# Patient Record
Sex: FEMALE | Race: WHITE | NOT HISPANIC OR LATINO | Employment: OTHER | URBAN - METROPOLITAN AREA
[De-identification: names, ages, dates, MRNs, and addresses within clinical notes are randomized per-mention and may not be internally consistent; named-entity substitution may affect disease eponyms.]

---

## 2017-01-04 RX ORDER — BUPROPION HYDROCHLORIDE 300 MG/1
TABLET ORAL
Qty: 30 TABLET | Refills: 5 | Status: SHIPPED | OUTPATIENT
Start: 2017-01-04 | End: 2017-07-04 | Stop reason: SDUPTHER

## 2017-01-05 ENCOUNTER — TELEPHONE (OUTPATIENT)
Dept: FAMILY MEDICINE CLINIC | Facility: CLINIC | Age: 72
End: 2017-01-05

## 2017-01-05 ENCOUNTER — OFFICE VISIT (OUTPATIENT)
Dept: FAMILY MEDICINE CLINIC | Facility: CLINIC | Age: 72
End: 2017-01-05

## 2017-01-05 VITALS
RESPIRATION RATE: 20 BRPM | HEART RATE: 105 BPM | HEIGHT: 61 IN | BODY MASS INDEX: 40.4 KG/M2 | OXYGEN SATURATION: 95 % | WEIGHT: 214 LBS | SYSTOLIC BLOOD PRESSURE: 126 MMHG | DIASTOLIC BLOOD PRESSURE: 84 MMHG | TEMPERATURE: 98 F

## 2017-01-05 DIAGNOSIS — J01.00 ACUTE NON-RECURRENT MAXILLARY SINUSITIS: ICD-10-CM

## 2017-01-05 DIAGNOSIS — J45.20 MILD INTERMITTENT ASTHMA WITHOUT COMPLICATION: Primary | ICD-10-CM

## 2017-01-05 DIAGNOSIS — K21.9 GASTROESOPHAGEAL REFLUX DISEASE WITHOUT ESOPHAGITIS: ICD-10-CM

## 2017-01-05 DIAGNOSIS — E78.2 MIXED HYPERLIPIDEMIA: ICD-10-CM

## 2017-01-05 PROCEDURE — 99213 OFFICE O/P EST LOW 20 MIN: CPT | Performed by: FAMILY MEDICINE

## 2017-01-05 RX ORDER — AMOXICILLIN AND CLAVULANATE POTASSIUM 875; 125 MG/1; MG/1
1 TABLET, FILM COATED ORAL 2 TIMES DAILY
Qty: 14 TABLET | Refills: 0 | Status: SHIPPED | OUTPATIENT
Start: 2017-01-05 | End: 2017-10-16

## 2017-01-05 RX ORDER — AMITRIPTYLINE HYDROCHLORIDE 10 MG/1
10 TABLET, FILM COATED ORAL NIGHTLY
Qty: 30 TABLET | Refills: 5 | Status: SHIPPED | OUTPATIENT
Start: 2017-01-05 | End: 2017-10-16 | Stop reason: SDUPTHER

## 2017-01-05 RX ORDER — PROMETHAZINE HYDROCHLORIDE AND CODEINE PHOSPHATE 6.25; 1 MG/5ML; MG/5ML
5 SYRUP ORAL EVERY 4 HOURS PRN
Qty: 118 ML | Refills: 0 | Status: SHIPPED | OUTPATIENT
Start: 2017-01-05 | End: 2017-10-16

## 2017-01-05 NOTE — PATIENT INSTRUCTIONS
This is a very nice 71-year-old with acute sinusitis.  I'll will prescribe Augmentin but would like her to use her Flonase and her antihistamine and take a probiotic and call me if she does not get better

## 2017-01-05 NOTE — TELEPHONE ENCOUNTER
Pt called and said she didn't fill cough syrup it was to expensive. Said this has happened before she said you gave to phenergan cough syrup and something else before. Can you send that over for her please?

## 2017-01-05 NOTE — MR AVS SNAPSHOT
Bella MOORE Roseline   1/5/2017 10:30 AM   Office Visit    Dept Phone:  [phone]   Encounter #:  [id number]    Provider:  Ambrose Wilson MD   Department:  [facility]IMARY [facility]                Your Full Care Plan              Today's Medication Changes          These changes are accurate as of: 1/5/17 10:49 AM.  If you have any questions, ask your nurse or doctor.               New Medication(s)Ordered:     amoxicillin-clavulanate 875-125 MG per tablet   Commonly known as:  AUGMENTIN   Take 1 tablet by mouth 2 (Two) Times a Day.            Where to Get Your Medications      These medications were sent to [facility]/pharmacy #[id number] [facility] [id number] [facility]. AT CORNER OF [facility] - [phone] [facility] [phone]   [id number] [place] [address], [place] [address]     Phone:  [phone]     amoxicillin-clavulanate 875-125 MG per tablet         You can get these medications from any pharmacy     Bring a paper prescription for each of these medications     HYDROcod Polst-CPM Polst ER 10-8 MG/5ML ER suspension                  Your Updated Medication List          This list is accurate as of: 1/5/17 10:49 AM.  Always use your most recent med list.                albuterol 108 (90 BASE) MCG/ACT inhaler   Commonly known as:  PROAIR RESPICLICK   Inhale 2 puffs Every 4 (Four) Hours As Needed for wheezing.       * amitriptyline 10 MG tablet   Commonly known as:  ELAVIL   TAKE 1 TABLET AT BEDTIME.       * amitriptyline 10 MG tablet   Commonly known as:  ELAVIL   TAKE 1 TABLET AT BEDTIME.       amLODIPine 5 MG tablet   Commonly known as:  NORVASC       amoxicillin-clavulanate 875-125 MG per tablet   Commonly known as:  AUGMENTIN   Take 1 tablet by mouth 2 (Two) Times a Day.       aspirin 81 MG tablet       atorvastatin 40 MG tablet   Commonly known as:  LIPITOR   TAKE 1 TABLET BY MOUTH EVERY DAY       baclofen 10 MG tablet   Commonly known as:  LIORESAL   Take 1 tablet by mouth 3  (three) times a day.       buPROPion  MG 24 hr tablet   Commonly known as:  WELLBUTRIN XL   TAKE 1 TABLET BY MOUTH DAILY.       cetirizine 10 MG tablet   Commonly known as:  zyrTEC       citalopram 10 MG tablet   Commonly known as:  CeleXA   TAKE 1 TABLET TWICE DAILY       doxycycline 50 MG capsule   Commonly known as:  MONODOX   Take 1 capsule by mouth 2 (Two) Times a Day.       ESTROGENS CONJ SYNTHETIC A PO       fluticasone 50 MCG/ACT nasal spray   Commonly known as:  FLONASE       guaifenesin-codeine 100-10 MG/5ML liquid   Commonly known as:  GUAIFENESIN AC   Take 5 mL by mouth 3 (Three) Times a Day As Needed for cough.       HYDROcod Polst-CPM Polst ER 10-8 MG/5ML ER suspension   Commonly known as:  TUSSIONEX PENNKINETIC ER   Take 5 mL by mouth Every 12 (Twelve) Hours As Needed for cough.       losartan 100 MG tablet   Commonly known as:  COZAAR   TAKE ONE TABLET BY MOUTH EVERY DAY       meloxicam 15 MG tablet   Commonly known as:  MOBIC   TAKE ONE TABLET BY MOUTH EVERY DAY       MethylPREDNISolone 4 MG tablet   Commonly known as:  MEDROL (LANDON)   Take as directed on package instructions.       montelukast 10 MG tablet   Commonly known as:  SINGULAIR   TAKE 1 TABLET BY MOUTH DAILY       multivitamin tablet tablet       PROTONIX 40 MG EC tablet   Generic drug:  pantoprazole       pseudoephedrine-guaifenesin  MG per 12 hr tablet   Commonly known as:  MUCINEX D   Take 1 tablet by mouth every 12 (twelve) hours.       STOOL SOFTENER 100 MG capsule   Generic drug:  docusate sodium       VITAMIN B-6 PO       VITAMIN B12 PO       VITAMIN D-3 PO       * Notice:  This list has 2 medication(s) that are the same as other medications prescribed for you. Read the directions carefully, and ask your doctor or other care provider to review them with you.            You Were Diagnosed With        Codes Comments    Mild intermittent asthma without complication    -  Primary ICD-10-CM: J45.20  ICD-9-CM: 493.90      "Gastroesophageal reflux disease without esophagitis     ICD-10-CM: K21.9  ICD-9-CM: 530.81     Mixed hyperlipidemia     ICD-10-CM: E78.2  ICD-9-CM: 272.2     Acute non-recurrent maxillary sinusitis     ICD-10-CM: J01.00  ICD-9-CM: 461.0       Instructions     None    Patient Instructions History      Upcoming Appointments     Visit Type Date Time Department    OFFICE VISIT 2017 10:30 AM [employer] Signup     [facility] allows you to send messages to your doctor, view your test results, renew your prescriptions, schedule appointments, and more. To sign up, go to [email or web address] and click on the Sign Up Now link in the New User? box. Enter your [employer] Activation Code exactly as it appears below along with the last four digits of your Social Security Number and your Date of Birth () to complete the sign-up process. If you do not sign up before the expiration date, you must request a new code.    Perfect [employer] Activation Code: [email or web address]  Expires: 2017  2:47 PM    If you have questions, you can email [email or web address] or call [phone] to talk to our Perfect [employer] staff. Remember, Perfect Memory is NOT to be used for urgent needs. For medical emergencies, dial 911.               Other Info from Your Visit           Allergies     Epinephrine        Reason for Visit     Sinusitis     URI had bronchitis in november       Vital Signs     Blood Pressure Pulse Temperature Respirations Height Weight    126/84 (BP Location: Left arm) 105 98 °F (36.7 °C) (Oral) 20 61\" (154.9 cm) 214 lb (97.1 kg)    Oxygen Saturation Body Mass Index Smoking Status             95% 40.43 kg/m2 Former Smoker         Problems and Diagnoses Noted     Acute non-recurrent maxillary sinusitis    High blood pressure    Acid reflux disease    Mild intermittent asthma    Mixed hyperlipidemia        "

## 2017-01-05 NOTE — PROGRESS NOTES
Subjective   Bella Dukes is a 71 y.o. female presenting with   Chief Complaint   Patient presents with   • Sinusitis   • URI     had bronchitis in november HPI Comments: This is a 71-year-old  nonsmoker whose  has ulcerative colitis so she has a lot of probiotic around the house, who tells me that for the last 5 days she has had marked sinus pressure and drainage.  She also has yellow nasal discharge and feels feverish.  I rechecked her temperature and got 99.5 which she tells me he is high for her.  I also rechecked her blood pressure and got 142/86.    Fortunately she does not have a high fever or chills or severe headache or confusion or stiff neck or double vision or chest pain or shortness of breath.  She does have a cough that is bothering her at night and would like to have a refill on the cough medication.    Sinusitis   Associated symptoms include sinus pressure.   URI    Associated symptoms include rhinorrhea.        The following portions of the patient's history were reviewed and updated as appropriate: current medications, past family history, past medical history, past social history, past surgical history and problem list.    Review of Systems   HENT: Positive for postnasal drip, rhinorrhea and sinus pressure.    All other systems reviewed and are negative.      Objective   Physical Exam   Constitutional: She is oriented to person, place, and time. She appears well-developed and well-nourished. No distress.   HENT:   Head: Normocephalic and atraumatic.   Right Ear: Tympanic membrane is erythematous. A middle ear effusion is present.   Left Ear: A middle ear effusion is present.   Nose: Mucosal edema and rhinorrhea present. No epistaxis.   Mouth/Throat: No oropharyngeal exudate.   Eyes: EOM are normal. Pupils are equal, round, and reactive to light. No scleral icterus.   Neck: Normal range of motion. Neck supple. No thyromegaly present.   Cardiovascular: Normal rate and regular  rhythm.    Pulmonary/Chest: Effort normal and breath sounds normal. No stridor.   Musculoskeletal: Normal range of motion. She exhibits no edema.   Neurological: She is alert and oriented to person, place, and time.   Skin: Skin is warm and dry. No rash noted.   Psychiatric: She has a normal mood and affect. Her behavior is normal.   Nursing note and vitals reviewed.      Assessment/Plan   Bella was seen today for sinusitis and uri.    Diagnoses and all orders for this visit:    Mild intermittent asthma without complication    Gastroesophageal reflux disease without esophagitis    Mixed hyperlipidemia    Acute non-recurrent maxillary sinusitis    Other orders  -     HYDROcod Polst-CPM Polst ER (TUSSIONEX PENNKINETIC ER) 10-8 MG/5ML ER suspension; Take 5 mL by mouth Every 12 (Twelve) Hours As Needed for cough.  -     amoxicillin-clavulanate (AUGMENTIN) 875-125 MG per tablet; Take 1 tablet by mouth 2 (Two) Times a Day.  -     amitriptyline (ELAVIL) 10 MG tablet; Take 1 tablet by mouth Every Night.                   I would like him to return for another visit in 6 month(s)

## 2017-01-05 NOTE — TELEPHONE ENCOUNTER
Pt said she doesn't want to come back because she is not sure how much the new script will cost.    She said cheratussin can be called in per phm and ins should cover it???

## 2017-01-09 RX ORDER — LOSARTAN POTASSIUM 100 MG/1
TABLET ORAL
Qty: 30 TABLET | Refills: 5 | Status: SHIPPED | OUTPATIENT
Start: 2017-01-09 | End: 2017-07-04 | Stop reason: SDUPTHER

## 2017-02-27 RX ORDER — MONTELUKAST SODIUM 10 MG/1
TABLET ORAL
Qty: 30 TABLET | Refills: 1 | Status: SHIPPED | OUTPATIENT
Start: 2017-02-27 | End: 2017-04-29 | Stop reason: SDUPTHER

## 2017-03-31 RX ORDER — CITALOPRAM 10 MG/1
TABLET ORAL
Qty: 60 TABLET | Refills: 2 | Status: SHIPPED | OUTPATIENT
Start: 2017-03-31 | End: 2017-06-26 | Stop reason: SDUPTHER

## 2017-05-01 RX ORDER — MONTELUKAST SODIUM 10 MG/1
TABLET ORAL
Qty: 30 TABLET | Refills: 5 | Status: SHIPPED | OUTPATIENT
Start: 2017-05-01 | End: 2017-11-06 | Stop reason: SDUPTHER

## 2017-06-26 RX ORDER — CITALOPRAM 10 MG/1
TABLET ORAL
Qty: 60 TABLET | Refills: 5 | Status: SHIPPED | OUTPATIENT
Start: 2017-06-26 | End: 2017-12-18 | Stop reason: SDUPTHER

## 2017-07-05 RX ORDER — LOSARTAN POTASSIUM 100 MG/1
TABLET ORAL
Qty: 30 TABLET | Refills: 5 | Status: SHIPPED | OUTPATIENT
Start: 2017-07-05 | End: 2018-07-20 | Stop reason: SDUPTHER

## 2017-07-05 RX ORDER — BUPROPION HYDROCHLORIDE 300 MG/1
TABLET ORAL
Qty: 30 TABLET | Refills: 5 | Status: SHIPPED | OUTPATIENT
Start: 2017-07-05 | End: 2017-10-16

## 2017-07-07 RX ORDER — BUPROPION HYDROCHLORIDE 300 MG/1
TABLET ORAL
Qty: 30 TABLET | Refills: 5 | Status: SHIPPED | OUTPATIENT
Start: 2017-07-07 | End: 2017-12-29 | Stop reason: SDUPTHER

## 2017-07-07 RX ORDER — LOSARTAN POTASSIUM 100 MG/1
TABLET ORAL
Qty: 30 TABLET | Refills: 5 | Status: SHIPPED | OUTPATIENT
Start: 2017-07-07 | End: 2017-10-16 | Stop reason: SDUPTHER

## 2017-09-01 RX ORDER — AMITRIPTYLINE HYDROCHLORIDE 10 MG/1
TABLET, FILM COATED ORAL
Qty: 30 TABLET | Refills: 5 | Status: SHIPPED | OUTPATIENT
Start: 2017-09-01 | End: 2017-12-29 | Stop reason: SDUPTHER

## 2017-09-20 RX ORDER — ATORVASTATIN CALCIUM 40 MG/1
TABLET, FILM COATED ORAL
Qty: 90 TABLET | Refills: 2 | OUTPATIENT
Start: 2017-09-20

## 2017-09-22 RX ORDER — ATORVASTATIN CALCIUM 40 MG/1
TABLET, FILM COATED ORAL
Qty: 90 TABLET | Refills: 1 | Status: SHIPPED | OUTPATIENT
Start: 2017-09-22 | End: 2018-04-04 | Stop reason: SDUPTHER

## 2017-10-16 ENCOUNTER — OFFICE VISIT (OUTPATIENT)
Dept: FAMILY MEDICINE CLINIC | Facility: CLINIC | Age: 72
End: 2017-10-16

## 2017-10-16 VITALS
DIASTOLIC BLOOD PRESSURE: 78 MMHG | BODY MASS INDEX: 40.97 KG/M2 | HEIGHT: 61 IN | TEMPERATURE: 95.6 F | HEART RATE: 90 BPM | WEIGHT: 217 LBS | SYSTOLIC BLOOD PRESSURE: 100 MMHG | OXYGEN SATURATION: 96 %

## 2017-10-16 DIAGNOSIS — J45.20 MILD INTERMITTENT ASTHMA WITHOUT COMPLICATION: ICD-10-CM

## 2017-10-16 DIAGNOSIS — M25.532 WRIST PAIN, ACUTE, LEFT: ICD-10-CM

## 2017-10-16 DIAGNOSIS — K21.9 GASTROESOPHAGEAL REFLUX DISEASE WITHOUT ESOPHAGITIS: ICD-10-CM

## 2017-10-16 DIAGNOSIS — I10 ESSENTIAL HYPERTENSION: ICD-10-CM

## 2017-10-16 DIAGNOSIS — E78.2 MIXED HYPERLIPIDEMIA: Primary | ICD-10-CM

## 2017-10-16 PROCEDURE — 73110 X-RAY EXAM OF WRIST: CPT | Performed by: FAMILY MEDICINE

## 2017-10-16 PROCEDURE — G0008 ADMIN INFLUENZA VIRUS VAC: HCPCS | Performed by: FAMILY MEDICINE

## 2017-10-16 PROCEDURE — 99213 OFFICE O/P EST LOW 20 MIN: CPT | Performed by: FAMILY MEDICINE

## 2017-10-16 PROCEDURE — 90662 IIV NO PRSV INCREASED AG IM: CPT | Performed by: FAMILY MEDICINE

## 2017-10-16 NOTE — PROGRESS NOTES
Procedure   Procedures   X Ray report:    To further evaluate her complaint of wrist pain after a fall I have requested a left wrist x-ray.  There are no old ones to compare this to but this shows some arthritic deformity and possibly a very subtle nondisplaced fracture of the distal radius.  I will await radiology over read.  However I have recommended she see our hand specialist.

## 2017-10-16 NOTE — PROGRESS NOTES
Subjective   Bella Dukes is a 72 y.o. 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  Since then she has had increased pain in the left wrist.    She also has had a left knee replacement and is having a lot of trouble with her right hip.  She also apparently has a lot of trouble with her right knee.  She needs a referral to an orthopedic doctor.    Cough     Cyst   Associated symptoms include arthralgias, coughing and joint swelling.        The following portions of the patient's history were reviewed and updated as appropriate: current medications, past family history, past medical history, past social history, past surgical history and problem list.    Review of Systems   Respiratory: Positive for cough.    Musculoskeletal: Positive for arthralgias and joint swelling.   All other systems reviewed and are negative.      Objective   Physical Exam   Constitutional: She is oriented to person, place, and time. She appears well-developed and well-nourished. No distress.   HENT:   Head: Normocephalic and atraumatic.   Eyes: EOM are normal. Pupils are equal, round, and reactive to light.   Neck: Normal range of motion. Neck supple.   Musculoskeletal: She exhibits edema (modest swelling of left wrist) and tenderness ( discomfort and decreased range of motion of left wrist without gross deformity).   Neurological: She is alert and oriented to person, place, and time.   Skin: Skin is warm and dry. She is not diaphoretic.   Psychiatric: She has a normal mood and affect. Her behavior is normal.   Nursing note and vitals  reviewed.      Assessment/Plan   Bella was seen today for cough, cyst, sore, referral and injections.    Diagnoses and all orders for this visit:    Mixed hyperlipidemia    Essential hypertension    Wrist pain, acute, left  -     XR Wrist 3+ View Left (In Office)    Gastroesophageal reflux disease without esophagitis    Mild intermittent asthma without complication                   I would like him to return for another visit in 6 month(s)

## 2017-10-16 NOTE — PATIENT INSTRUCTIONS
This is a very nice 72-year-old who fell and now has discomfort in her left wrist.  I will request consultation with our hand specialist.  I would like her to call if there is a problem.

## 2017-11-07 ENCOUNTER — APPOINTMENT (OUTPATIENT)
Dept: WOMENS IMAGING | Facility: HOSPITAL | Age: 72
End: 2017-11-07

## 2017-11-07 PROCEDURE — G0202 SCR MAMMO BI INCL CAD: HCPCS | Performed by: RADIOLOGY

## 2017-11-07 RX ORDER — MONTELUKAST SODIUM 10 MG/1
TABLET ORAL
Qty: 30 TABLET | Refills: 5 | Status: SHIPPED | OUTPATIENT
Start: 2017-11-07 | End: 2018-04-03 | Stop reason: SDUPTHER

## 2017-12-19 RX ORDER — CITALOPRAM 10 MG/1
TABLET ORAL
Qty: 60 TABLET | Refills: 5 | Status: SHIPPED | OUTPATIENT
Start: 2017-12-19 | End: 2017-12-29 | Stop reason: SDUPTHER

## 2017-12-28 ENCOUNTER — TELEPHONE (OUTPATIENT)
Dept: FAMILY MEDICINE CLINIC | Facility: CLINIC | Age: 72
End: 2017-12-28

## 2017-12-28 NOTE — TELEPHONE ENCOUNTER
[facility] is requesting refill on Bella's    Citalopram 10 mg # 180    a 90 day supply                                                               Bupropion XL 300mg # 90                                                               Amitriptyline   10 mg        #90      LOV   10-16-17

## 2017-12-29 RX ORDER — CITALOPRAM 10 MG/1
10 TABLET ORAL 2 TIMES DAILY
Qty: 60 TABLET | Refills: 5 | Status: SHIPPED | OUTPATIENT
Start: 2017-12-29 | End: 2018-07-30 | Stop reason: SDUPTHER

## 2017-12-29 RX ORDER — BUPROPION HYDROCHLORIDE 300 MG/1
300 TABLET ORAL EVERY MORNING
Qty: 30 TABLET | Refills: 5 | Status: SHIPPED | OUTPATIENT
Start: 2017-12-29 | End: 2018-07-30 | Stop reason: SDUPTHER

## 2017-12-29 RX ORDER — AMITRIPTYLINE HYDROCHLORIDE 10 MG/1
10 TABLET, FILM COATED ORAL NIGHTLY
Qty: 30 TABLET | Refills: 5 | Status: SHIPPED | OUTPATIENT
Start: 2017-12-29 | End: 2018-07-09 | Stop reason: SDUPTHER

## 2018-04-03 RX ORDER — MONTELUKAST SODIUM 10 MG/1
TABLET ORAL
Qty: 30 TABLET | Refills: 2 | Status: SHIPPED | OUTPATIENT
Start: 2018-04-03 | End: 2020-04-13

## 2018-04-04 RX ORDER — ATORVASTATIN CALCIUM 40 MG/1
TABLET, FILM COATED ORAL
Qty: 90 TABLET | Refills: 1 | Status: SHIPPED | OUTPATIENT
Start: 2018-04-04 | End: 2020-03-24

## 2018-04-06 ENCOUNTER — OFFICE VISIT (OUTPATIENT)
Dept: FAMILY MEDICINE CLINIC | Facility: CLINIC | Age: 73
End: 2018-04-06

## 2018-04-06 VITALS
SYSTOLIC BLOOD PRESSURE: 102 MMHG | HEIGHT: 61 IN | HEART RATE: 81 BPM | OXYGEN SATURATION: 94 % | TEMPERATURE: 98.5 F | DIASTOLIC BLOOD PRESSURE: 72 MMHG | RESPIRATION RATE: 16 BRPM

## 2018-04-06 DIAGNOSIS — I10 ESSENTIAL HYPERTENSION: ICD-10-CM

## 2018-04-06 DIAGNOSIS — M25.562 CHRONIC PAIN OF LEFT KNEE: Primary | ICD-10-CM

## 2018-04-06 DIAGNOSIS — G89.29 CHRONIC PAIN OF LEFT KNEE: Primary | ICD-10-CM

## 2018-04-06 PROBLEM — M25.569 KNEE PAIN: Status: ACTIVE | Noted: 2018-04-06

## 2018-04-06 PROCEDURE — 99213 OFFICE O/P EST LOW 20 MIN: CPT | Performed by: FAMILY MEDICINE

## 2018-04-06 NOTE — PATIENT INSTRUCTIONS
This is a very nice 73-year-old with end-stage osteoarthritis of the left knee.  I will request orthopedic consultation.

## 2018-04-06 NOTE — PROGRESS NOTES
Subjective   Bella Dukes is a 73 y.o. female presenting with   Chief Complaint   Patient presents with   • Knee Pain     left knee pain- says that she needs a replacement         73-year-old white female nonsmoker says that she knew 10 years ago she had bone-on-bone left knee pain when she had her right knee replacement.  However she has managed to go this long without significant problems.  However she says that in the last couple of weeks her knee has become quite uncomfortable.  She also says she is unable to completely straighten it.  She does not have any recent trauma to the area.  The knee is not hot or red but her friend told her she thought it was gout.  I discussed with her and her family the normal symptoms of gout and the normal symptoms of in the stage osteoarthritis.  However she still would like to have blood work to make sure it is not gout.    She has already called an orthopedist that she has a relationship with but does not yet have an appointment.  I will put in a generic request for consultation.      Knee Pain           The following portions of the patient's history were reviewed and updated as appropriate: current medications, past family history, past medical history, past social history, past surgical history and problem list.    Review of Systems   Musculoskeletal: Positive for arthralgias, gait problem and joint swelling.   All other systems reviewed and are negative.      Objective   Physical Exam   Constitutional: She is oriented to person, place, and time. She appears well-developed and well-nourished.   HENT:   Head: Normocephalic and atraumatic.   Eyes: EOM are normal. Pupils are equal, round, and reactive to light.   Neck: Normal range of motion. Neck supple.   Cardiovascular: Normal rate.    Pulmonary/Chest: Effort normal.   Musculoskeletal: She exhibits tenderness (she has tenderness to range of motion of her knee with limitation in attempted full extension.  However there is no  instability) and deformity (osteoarthritic deformity of the left knee). She exhibits no edema ( no knee effusion and the knee is not hot or red).   Neurological: She is alert and oriented to person, place, and time.   Skin: Skin is warm and dry.   Psychiatric: She has a normal mood and affect. Her behavior is normal.   Nursing note and vitals reviewed.      Assessment/Plan   Bella was seen today for knee pain.    Diagnoses and all orders for this visit:    Chronic pain of left knee  -     Comprehensive Metabolic Panel  -     Uric Acid  -     Ambulatory Referral to Orthopedic Surgery    Essential hypertension                   I would like him to return for another visit in 6 month(s)

## 2018-04-07 LAB
ALBUMIN SERPL-MCNC: 4.2 G/DL (ref 3.5–5.2)
ALBUMIN/GLOB SERPL: 1.4 G/DL
ALP SERPL-CCNC: 66 U/L (ref 39–117)
ALT SERPL-CCNC: 13 U/L (ref 1–33)
AST SERPL-CCNC: 16 U/L (ref 1–32)
BILIRUB SERPL-MCNC: 0.3 MG/DL (ref 0.1–1.2)
BUN SERPL-MCNC: 8 MG/DL (ref 8–23)
BUN/CREAT SERPL: 10.8 (ref 7–25)
CALCIUM SERPL-MCNC: 10 MG/DL (ref 8.6–10.5)
CHLORIDE SERPL-SCNC: 102 MMOL/L (ref 98–107)
CO2 SERPL-SCNC: 27.1 MMOL/L (ref 22–29)
CREAT SERPL-MCNC: 0.74 MG/DL (ref 0.57–1)
GFR SERPLBLD CREATININE-BSD FMLA CKD-EPI: 77 ML/MIN/1.73
GFR SERPLBLD CREATININE-BSD FMLA CKD-EPI: 94 ML/MIN/1.73
GLOBULIN SER CALC-MCNC: 3 GM/DL
GLUCOSE SERPL-MCNC: 81 MG/DL (ref 65–99)
POTASSIUM SERPL-SCNC: 4.5 MMOL/L (ref 3.5–5.2)
PROT SERPL-MCNC: 7.2 G/DL (ref 6–8.5)
SODIUM SERPL-SCNC: 143 MMOL/L (ref 136–145)
URATE SERPL-MCNC: 5.5 MG/DL (ref 2.4–5.7)

## 2018-04-11 RX ORDER — MONTELUKAST SODIUM 10 MG/1
TABLET ORAL
Qty: 90 TABLET | Refills: 3 | Status: SHIPPED | OUTPATIENT
Start: 2018-04-11 | End: 2019-08-01

## 2018-07-10 RX ORDER — AMITRIPTYLINE HYDROCHLORIDE 10 MG/1
10 TABLET, FILM COATED ORAL NIGHTLY
Qty: 30 TABLET | Refills: 5 | Status: SHIPPED | OUTPATIENT
Start: 2018-07-10 | End: 2018-12-31 | Stop reason: SDUPTHER

## 2018-07-20 RX ORDER — LOSARTAN POTASSIUM 100 MG/1
TABLET ORAL
Qty: 30 TABLET | Refills: 5 | Status: SHIPPED | OUTPATIENT
Start: 2018-07-20 | End: 2018-09-28 | Stop reason: SDUPTHER

## 2018-07-31 RX ORDER — BUPROPION HYDROCHLORIDE 300 MG/1
300 TABLET ORAL EVERY MORNING
Qty: 30 TABLET | Refills: 5 | Status: SHIPPED | OUTPATIENT
Start: 2018-07-31 | End: 2019-01-23 | Stop reason: SDUPTHER

## 2018-07-31 RX ORDER — CITALOPRAM 10 MG/1
10 TABLET ORAL 2 TIMES DAILY
Qty: 60 TABLET | Refills: 5 | Status: SHIPPED | OUTPATIENT
Start: 2018-07-31 | End: 2019-02-17 | Stop reason: SDUPTHER

## 2018-09-28 RX ORDER — LOSARTAN POTASSIUM 100 MG/1
100 TABLET ORAL DAILY
Qty: 90 TABLET | Refills: 1 | Status: SHIPPED | OUTPATIENT
Start: 2018-09-28 | End: 2019-05-26 | Stop reason: SDUPTHER

## 2018-12-07 ENCOUNTER — APPOINTMENT (OUTPATIENT)
Dept: WOMENS IMAGING | Facility: HOSPITAL | Age: 73
End: 2018-12-07

## 2018-12-07 PROCEDURE — 77063 BREAST TOMOSYNTHESIS BI: CPT | Performed by: RADIOLOGY

## 2018-12-07 PROCEDURE — 77067 SCR MAMMO BI INCL CAD: CPT | Performed by: RADIOLOGY

## 2018-12-31 RX ORDER — AMITRIPTYLINE HYDROCHLORIDE 10 MG/1
10 TABLET, FILM COATED ORAL NIGHTLY
Qty: 30 TABLET | Refills: 1 | Status: SHIPPED | OUTPATIENT
Start: 2018-12-31 | End: 2020-03-05

## 2019-01-14 ENCOUNTER — TELEPHONE (OUTPATIENT)
Dept: FAMILY MEDICINE CLINIC | Facility: CLINIC | Age: 74
End: 2019-01-14

## 2019-01-16 ENCOUNTER — TELEPHONE (OUTPATIENT)
Dept: FAMILY MEDICINE CLINIC | Facility: CLINIC | Age: 74
End: 2019-01-16

## 2019-01-23 RX ORDER — BUPROPION HYDROCHLORIDE 300 MG/1
TABLET ORAL
Qty: 30 TABLET | Refills: 5 | Status: SHIPPED | OUTPATIENT
Start: 2019-01-23 | End: 2019-02-04 | Stop reason: SDUPTHER

## 2019-02-04 ENCOUNTER — TELEPHONE (OUTPATIENT)
Dept: FAMILY MEDICINE CLINIC | Facility: CLINIC | Age: 74
End: 2019-02-04

## 2019-02-04 RX ORDER — BUPROPION HYDROCHLORIDE 300 MG/1
300 TABLET ORAL EVERY MORNING
Qty: 90 TABLET | Refills: 1 | Status: SHIPPED | OUTPATIENT
Start: 2019-02-04 | End: 2019-08-01 | Stop reason: SDUPTHER

## 2019-02-18 RX ORDER — CITALOPRAM 10 MG/1
TABLET ORAL
Qty: 60 TABLET | Refills: 5 | Status: SHIPPED | OUTPATIENT
Start: 2019-02-18 | End: 2021-02-09

## 2019-05-28 RX ORDER — LOSARTAN POTASSIUM 100 MG/1
TABLET ORAL
Qty: 30 TABLET | Refills: 0 | Status: SHIPPED | OUTPATIENT
Start: 2019-05-28 | End: 2020-03-05

## 2019-06-24 RX ORDER — LOSARTAN POTASSIUM 100 MG/1
TABLET ORAL
Qty: 90 TABLET | Refills: 1 | OUTPATIENT
Start: 2019-06-24

## 2019-08-01 ENCOUNTER — OFFICE VISIT (OUTPATIENT)
Dept: FAMILY MEDICINE CLINIC | Facility: CLINIC | Age: 74
End: 2019-08-01

## 2019-08-01 VITALS
OXYGEN SATURATION: 93 % | WEIGHT: 210.5 LBS | DIASTOLIC BLOOD PRESSURE: 82 MMHG | HEART RATE: 86 BPM | SYSTOLIC BLOOD PRESSURE: 124 MMHG | BODY MASS INDEX: 39.74 KG/M2 | HEIGHT: 61 IN | TEMPERATURE: 97.8 F

## 2019-08-01 DIAGNOSIS — I10 ESSENTIAL HYPERTENSION: Primary | ICD-10-CM

## 2019-08-01 DIAGNOSIS — R09.81 NASAL CONGESTION: ICD-10-CM

## 2019-08-01 DIAGNOSIS — R05.9 COUGH: ICD-10-CM

## 2019-08-01 DIAGNOSIS — G47.33 OBSTRUCTIVE SLEEP APNEA SYNDROME: ICD-10-CM

## 2019-08-01 DIAGNOSIS — R13.10 DYSPHAGIA, UNSPECIFIED TYPE: ICD-10-CM

## 2019-08-01 DIAGNOSIS — F32.A DEPRESSION, UNSPECIFIED DEPRESSION TYPE: ICD-10-CM

## 2019-08-01 DIAGNOSIS — R09.82 PND (POST-NASAL DRIP): ICD-10-CM

## 2019-08-01 DIAGNOSIS — T17.308S CHOKING, SEQUELA: ICD-10-CM

## 2019-08-01 PROBLEM — T17.308A CHOKING: Status: ACTIVE | Noted: 2019-08-01

## 2019-08-01 PROCEDURE — 99214 OFFICE O/P EST MOD 30 MIN: CPT | Performed by: FAMILY MEDICINE

## 2019-08-01 RX ORDER — DILTIAZEM HYDROCHLORIDE 60 MG/1
2 TABLET, FILM COATED ORAL
Qty: 1 INHALER | Refills: 11 | Status: SHIPPED | OUTPATIENT
Start: 2019-08-01 | End: 2021-02-09

## 2019-08-01 RX ORDER — FLUTICASONE PROPIONATE 50 MCG
2 SPRAY, SUSPENSION (ML) NASAL DAILY
Qty: 1 BOTTLE | Refills: 11 | Status: SHIPPED | OUTPATIENT
Start: 2019-08-01 | End: 2021-02-09

## 2019-08-01 RX ORDER — BUPROPION HYDROCHLORIDE 300 MG/1
300 TABLET ORAL EVERY MORNING
Qty: 90 TABLET | Refills: 1 | Status: SHIPPED | OUTPATIENT
Start: 2019-08-01 | End: 2020-02-12

## 2019-08-01 RX ORDER — AZELASTINE HCL 205.5 UG/1
2 SPRAY NASAL 2 TIMES DAILY
Qty: 1 EACH | Refills: 11 | Status: SHIPPED | OUTPATIENT
Start: 2019-08-01 | End: 2021-02-09

## 2019-08-01 RX ORDER — CLOBETASOL PROPIONATE 0.5 MG/G
OINTMENT TOPICAL 2 TIMES DAILY
COMMUNITY
End: 2022-12-20

## 2019-08-01 RX ORDER — MELOXICAM 15 MG/1
15 TABLET ORAL DAILY
COMMUNITY
Start: 2019-02-01 | End: 2020-03-20

## 2019-08-01 RX ORDER — NYSTATIN 100000 [USP'U]/G
POWDER TOPICAL
COMMUNITY
Start: 2018-12-07 | End: 2021-02-09

## 2019-08-01 RX ORDER — CYANOCOBALAMIN/FOLIC AC/VIT B6 0.5-2.2-25
TABLET ORAL
COMMUNITY

## 2019-08-01 NOTE — PROGRESS NOTES
Subjective   Bella Dukes is a 74 y.o. female.     Chief Complaint   Patient presents with   • Med Management   • Cough   • Allergies   • Difficulty Swallowing   • Choking     busting blood vessels in choking spell   • Hypertension       Cough   This is a chronic problem. The current episode started more than 1 year ago. The problem has been unchanged. The problem occurs every few hours. The cough is non-productive. Associated symptoms include postnasal drip, a sore throat, shortness of breath and wheezing. Pertinent negatives include no chest pain, chills, ear congestion, ear pain, fever, headaches, heartburn, hemoptysis, myalgias, nasal congestion, rash, rhinorrhea, sweats or weight loss. Nothing aggravates the symptoms. Treatments tried: antihistamines. Her past medical history is significant for asthma. There is no history of bronchiectasis, bronchitis, COPD, emphysema, environmental allergies or pneumonia.   Allergies   This is a chronic problem. The current episode started more than 1 year ago. The problem occurs constantly. Associated symptoms include coughing and a sore throat. Pertinent negatives include no chest pain, chills, fatigue, fever, headaches, myalgias or rash.   Difficulty Swallowing   This is a chronic problem. The current episode started more than 1 year ago. The problem occurs daily. Progression since onset: had swallow test 3 years ago, which was normal. Associated symptoms include coughing and a sore throat. Pertinent negatives include no chest pain, chills, fatigue, fever, headaches, myalgias or rash. She has tried eating for the symptoms.   Choking   This is a chronic problem. The current episode started more than 1 year ago. The problem occurs daily. The problem has been gradually worsening. Associated symptoms include coughing and a sore throat. Pertinent negatives include no chest pain, chills, fatigue, fever, headaches, myalgias or rash. She has tried nothing for the symptoms.    Hypertension   This is a chronic problem. The current episode started more than 1 year ago. The problem is unchanged. The problem is controlled. Associated symptoms include shortness of breath. Pertinent negatives include no blurred vision, chest pain, headaches, palpitations or sweats. Past treatments include angiotensin blockers. Current antihypertension treatment includes angiotensin blockers. The current treatment provides significant improvement. There are no compliance problems.  There is no history of angina, kidney disease, CAD/MI, CVA, heart failure, left ventricular hypertrophy, PVD or retinopathy.          The following portions of the patient's history were reviewed and updated as appropriate: allergies, current medications, past family history, past medical history, past social history, past surgical history and problem list.    Past Medical History:   Diagnosis Date   • Allergic rhinitis    • Allergy    • Angina pectoris (CMS/HCC)    • Anxiety    • Arthritis    • Asthma    • Carpal tunnel syndrome    • Chafing    • Chronic lower back pain    • Constipation    • Constipation    • Depression    • Depression    • GERD (gastroesophageal reflux disease)    • Hemorrhoids    • Hoarseness    • Hypertension    • Joint pain     both hips   • Knee pain     soft tissue   • Migraine headache    • Migraines    • Morbid obesity (CMS/HCC)    • Neck pain    • Obesity    • Other urinary problems     temporary urinary loss of control with cough and/or sneeze   • Palpitations    • Plantar fasciitis    • Pneumonia    • Seasonal allergies    • Sleep apnea    • Sleep apnea        Past Surgical History:   Procedure Laterality Date   • BARIATRIC SURGERY     • COLONOSCOPY  11/18/2013   • HYSTERECTOMY     • KNEE SURGERY Right    • KNEE SURGERY     • LAPAROSCOPIC GASTRIC BANDING     • TONSILLECTOMY         Family History   Problem Relation Age of Onset   • Stroke Mother         ischemic   • Arthritis Mother    • Stroke Father          ischemic   • Arthritis Father    • Stroke Paternal Grandfather         ischemic   • Arthritis Paternal Grandfather    • Asthma Brother    • Arthritis Maternal Grandfather    • Arthritis Paternal Grandmother        Social History     Socioeconomic History   • Marital status: Unknown     Spouse name: Not on file   • Number of children: Not on file   • Years of education: Not on file   • Highest education level: Not on file   Tobacco Use   • Smoking status: Former Smoker   • Smokeless tobacco: Never Used   Substance and Sexual Activity   • Alcohol use: No   • Drug use: No       Review of Systems   Constitutional: Negative for chills, fatigue, fever and unexpected weight loss.   HENT: Positive for postnasal drip and sore throat. Negative for ear pain and rhinorrhea.    Eyes: Negative for blurred vision.   Respiratory: Positive for cough, choking, shortness of breath and wheezing. Negative for hemoptysis and chest tightness.    Cardiovascular: Negative for chest pain, palpitations and leg swelling.   Musculoskeletal: Negative for myalgias.   Skin: Negative for rash.   Allergic/Immunologic: Negative for environmental allergies.   Neurological: Negative for dizziness, light-headedness and headache.       Objective   Vitals:    08/01/19 1300   BP: 124/82   Pulse: 86   Temp: 97.8 °F (36.6 °C)   SpO2: 93%     Body mass index is 39.77 kg/m².  Physical Exam   Constitutional: She appears well-developed and well-nourished. No distress.   HENT:   b ear effusion   Cardiovascular: Normal rate and regular rhythm. Exam reveals no friction rub.   No murmur heard.  Pulmonary/Chest: Effort normal. No stridor. No respiratory distress. She has wheezes. She has no rales. She exhibits no tenderness.   Skin: She is not diaphoretic.   Nursing note and vitals reviewed.        Assessment/Plan   Bella was seen today for med management, cough, allergies, difficulty swallowing, choking and hypertension.    Diagnoses and all orders for this  visit:    Essential hypertension    Obstructive sleep apnea syndrome    Cough  -     SYMBICORT 80-4.5 MCG/ACT inhaler; Inhale 2 puffs 2 (Two) Times a Day.    Nasal congestion    PND (post-nasal drip)  -     azelastine (ASTEPRO) 0.15 % solution nasal spray; 2 sprays into the nostril(s) as directed by provider 2 (Two) Times a Day.  -     fluticasone (FLONASE) 50 MCG/ACT nasal spray; 2 sprays into the nostril(s) as directed by provider Daily.    Choking, sequela    Dysphagia, unspecified type  -     FL Video Swallow; Future    Depression, unspecified depression type  -     buPROPion XL (WELLBUTRIN XL) 300 MG 24 hr tablet; Take 1 tablet by mouth Every Morning.

## 2019-08-27 ENCOUNTER — HOSPITAL ENCOUNTER (OUTPATIENT)
Dept: GENERAL RADIOLOGY | Facility: HOSPITAL | Age: 74
Discharge: HOME | End: 2019-08-27
Admitting: FAMILY MEDICINE

## 2019-08-27 DIAGNOSIS — R13.10 DYSPHAGIA, UNSPECIFIED TYPE: ICD-10-CM

## 2019-08-27 PROCEDURE — 63710000001 BARIUM SULFATE 96 % RECONSTITUTED SUSPENSION: Performed by: FAMILY MEDICINE

## 2019-08-27 PROCEDURE — 63710000001 BARIUM SULFATE 98 % RECONSTITUTED SUSPENSION: Performed by: FAMILY MEDICINE

## 2019-08-27 PROCEDURE — 92611 MOTION FLUOROSCOPY/SWALLOW: CPT

## 2019-08-27 PROCEDURE — A9270 NON-COVERED ITEM OR SERVICE: HCPCS | Performed by: FAMILY MEDICINE

## 2019-08-27 PROCEDURE — 63710000001 BARIUM SULFATE 40 % SUSPENSION: Performed by: FAMILY MEDICINE

## 2019-08-27 PROCEDURE — 74230 X-RAY XM SWLNG FUNCJ C+: CPT

## 2019-08-27 RX ADMIN — BARIUM SULFATE 65 ML: 960 POWDER, FOR SUSPENSION ORAL at 13:52

## 2019-08-27 RX ADMIN — BARIUM SULFATE 4 ML: 980 POWDER, FOR SUSPENSION ORAL at 13:52

## 2019-08-27 RX ADMIN — BARIUM SULFATE 50 ML: 400 SUSPENSION ORAL at 13:52

## 2019-08-27 NOTE — MBS/VFSS/FEES
Outpatient Speech Language Pathology   Adult Swallow Initial Evaluation  [facility]     Patient Name: Bella Dukes  : 1945  MRN: [id number]  Today's Date: 2019         Visit Date: 2019   Patient Active Problem List   Diagnosis   • Dilatation of aorta (CMS/HCC)   • Mild intermittent asthma without complication   • Constipation   • Depression   • Gastroesophageal reflux disease without esophagitis   • Mixed hyperlipidemia   • Essential hypertension   • Low back pain   • Pericardial effusion   • Morbid obesity (CMS/HCC)   • Thoracic back pain   • Dysphagia   • Fatigue   • Sleep apnea   • Dietary counseling   • Acute non-recurrent maxillary sinusitis   • Wrist pain, acute, left   • Knee pain   • Choking   • Nasal congestion   • Cough   • PND (post-nasal drip)        Past Medical History:   Diagnosis Date   • Allergic rhinitis    • Allergy    • Angina pectoris (CMS/HCC)    • Anxiety    • Arthritis    • Asthma    • Carpal tunnel syndrome    • Chafing    • Chronic lower back pain    • Constipation    • Depression    • Former smoker    • GERD (gastroesophageal reflux disease)    • Hemorrhoids    • High blood pressure    • High cholesterol    • Hoarseness    • Hypertension    • Joint pain     both hips   • Knee pain     soft tissue   • Migraine headache    • Migraines    • Morbid obesity (CMS/HCC)    • Neck pain    • Obesity    • Other urinary problems     temporary urinary loss of control with cough and/or sneeze   • Palpitations    • Plantar fasciitis    • Pneumonia    • Seasonal allergies    • Sleep apnea         Past Surgical History:   Procedure Laterality Date   • BARIATRIC SURGERY     • COLONOSCOPY  2013   • HYSTERECTOMY      Not due to cancer   • KNEE SURGERY Right    • LAPAROSCOPIC GASTRIC BANDING     • REPLACEMENT TOTAL KNEE     • TONSILLECTOMY           Visit Dx:     ICD-10-CM ICD-9-CM   1. Dysphagia, unspecified type R13.10 787.20           SLP Adult Swallow Evaluation - 19 1600         Rehab Evaluation    Document Type  evaluation   -AW    Subjective Information  no complaints   -AW    Patient Observations  alert;cooperative;agree to therapy   -AW    Patient/Family Observations  Pt able to provide history.   -AW    Patient Effort  good   -AW    Symptoms Noted During/After Treatment  none   -AW       General Information    Patient Profile Reviewed  yes   -AW    Pertinent History Of Current Problem  Pt c/o sinus drainage and cough x3 years. Pt reported choking on own salivia and with meals at times. Pt has h/o GERD (on protonix), asthsmal, and lap band sx 7 years ago. Pt stated she is very careful with eating and denies recent pneumonia.   -AW    Current Method of Nutrition  regular textures;thin liquids   -AW    Precautions/Limitations, Vision  WFL   -AW    Precautions/Limitations, Hearing  WFL;for purposes of eval   -AW    Prior Level of Function-Communication  WFL   -AW    Prior Level of Function-Swallowing  no diet consistency restrictions   -AW    Plans/Goals Discussed with  patient;agreed upon   -AW    Barriers to Rehab  none identified   -AW    Patient's Goals for Discharge  eat/drink without coughing/choking   -AW       Pain Assessment    Additional Documentation  Pain Scale: Numbers Pre/Post-Treatment (Group)   -AW       Pain Scale: Numbers Pre/Post-Treatment    Pain Scale: Numbers, Pretreatment  0/10 - no pain   -AW    Pain Scale: Numbers, Post-Treatment  0/10 - no pain   -AW       Oral Motor and Function    Dentition Assessment  natural, present and adequate   -AW    Secretion Management  WNL/WFL   -AW    Mucosal Quality  moist, healthy   -AW       Oral Musculature and Cranial Nerve Assessment    Oral Motor General Assessment  WFL   -AW    Oral Labial or Buccal Impairment, Detail, Cranial Nerve VII (Facial):  reduced ROM;other (see comments) lower lip asymetry   -AW       General Eating/Swallowing Observations    Respiratory Support Currently in Use  room air   -AW     Eating/Swallowing Skills  self-fed;fed by SLP   -AW    Positioning During Eating  upright in chair   -AW       MBS/VFSS    Utensils Used  spoon;cup;straw   -AW    Consistencies Trialed  regular textures;soft textures;pureed;thin liquids;nectar/syrup-thick liquids mixed   -AW       MBS/VFSS Interpretation    Oral Prep Phase  WFL   -AW    Oral Transit Phase  WFL   -AW    Oral Residue  WFL   -AW    VFSS Summary  Pt presented with a functional oropharyngeal swallow. Pt tolerated thins via cup and straw. Mastication and oral control was functional for pureed, soft, and regular textures. Trace transient penetration was noted with mixed consistencies, however, this is functional. Trace to mild pharyngeal residuals were cleared with a spontaneous swallow. An esophageal scan was completed with NTL with no abnormalities. Recommended pt continue on a regular diet and avoid talking with food in mouth. Long discussion with pt re: swallowing and what could be causing her difficulty. Pt had dry cough prior to exam but no coughing during. Question multiple causes including BP medicine side effect and allergies. Pt considering having allergies tested. Encouraged pt to keep a log of choking events and record time of day and whether it was with food/liquid or on own salivia. Pt in agreement.   -AW       Initiation of Pharyngeal Swallow    Pharyngeal Phase  functional pharyngeal phase of swallowing   -AW       SLP Communication to Radiology    Summary Statement  Pt presented with a functional oropharyngeal swallow. Pt tolerated thins via cup and straw. Mastication and oral control was functional for pureed, soft, and regular textures. Trace transient penetration was noted with mixed consistencies, however, this is functional. Trace to mild pharyngeal residuals were cleared with a spontaneous swallow. An esophageal scan was completed with NTL with no abnormalities.    -AW       Clinical Impression    SLP Swallowing Diagnosis  functional  oral phase;functional pharyngeal phase   -AW    Functional Impact  no impact on function   -AW    Swallow Criteria for Skilled Therapeutic Interventions Met  no problems identified which require skilled intervention   -AW       Recommendations    Therapy Frequency (Swallow)  evaluation only   -AW    SLP Diet Recommendation  regular textures;thin liquids   -AW    Recommended Precautions and Strategies  upright posture during/after eating;small bites of food and sips of liquid   -AW    SLP Rec. for Method of Medication Administration  meds whole;with thin liquids;with pudding or applesauce   -AW      User Key  (r) = Recorded By, (t) = Taken By, (c) = Cosigned By    Initials Name Provider Type    Deepti Pulido MS CCC-SLP Speech and Language Pathologist                        OP SLP Education     Row Name 08/27/19 1638       Education    Barriers to Learning  No barriers identified  -AW    Education Provided  Described results of evaluation;Patient expressed understanding of evaluation;Patient demonstrated recommended strategies  -AW    Assessed  Learning needs;Learning motivation;Learning preferences;Learning readiness  -AW    Learning Motivation  Strong  -AW    Learning Method  Explanation;Demonstration  -AW    Teaching Response  Verbalized understanding;Demonstrated understanding  -AW      User Key  (r) = Recorded By, (t) = Taken By, (c) = Cosigned By    Initials Name Effective Dates    Deepti Pulido MS CCC-SLP 06/08/18 -               OP SLP Assessment/Plan - 08/27/19 1638        SLP Assessment    Functional Problems  Swallowing   -AW    Impact on Function: Swallowing  Risk of aspiration   -AW    Clinical Impression: Swallowing  WNL   -AW    Prognosis  Good (comment)   -AW    Patient/caregiver participated in establishment of treatment plan and goals  Yes   -AW    Patient would benefit from skilled therapy intervention  No   -AW      User Key  (r) = Recorded By, (t) = Taken By, (c) = Cosigned By    Initials Name  Provider Type    Deepti Pulido MS CCC-SLP Speech and Language Pathologist              SLP Outcome Measures (last 72 hours)      SLP Outcome Measures     Row Name 08/27/19 1600             SLP Outcome Measures    Outcome Measure Used?  Adult NOMS  -AW         Adult FCM Scores    FCM Chosen  Swallowing  -AW      Swallowing FCM Score  7  -AW        User Key  (r) = Recorded By, (t) = Taken By, (c) = Cosigned By    Initials Name Effective Dates    Deepti Pulido MS CCC-SLP 06/08/18 -                Time Calculation:   SLP Start Time: 1300  SLP Stop Time: 1415  SLP Time Calculation (min): 75 min    Therapy Charges for Today     Code Description Service Date Service Provider Modifiers Qty    [id number] HC ST MOTION FLUORO EVAL SWALLOW 5 8/27/2019 Deepti Zhu MS CCC-SLP GN 1                   Deepti Zhu MS CCC-SLP  8/27/2019

## 2019-11-01 ENCOUNTER — OFFICE VISIT (OUTPATIENT)
Dept: FAMILY MEDICINE CLINIC | Facility: CLINIC | Age: 74
End: 2019-11-01

## 2019-11-01 VITALS
OXYGEN SATURATION: 95 % | SYSTOLIC BLOOD PRESSURE: 138 MMHG | HEART RATE: 84 BPM | WEIGHT: 217.19 LBS | BODY MASS INDEX: 41.01 KG/M2 | HEIGHT: 61 IN | TEMPERATURE: 98.3 F | DIASTOLIC BLOOD PRESSURE: 86 MMHG

## 2019-11-01 DIAGNOSIS — T78.40XS ALLERGIC STATE, SEQUELA: ICD-10-CM

## 2019-11-01 DIAGNOSIS — J45.909 MILD ASTHMA WITHOUT COMPLICATION, UNSPECIFIED WHETHER PERSISTENT: ICD-10-CM

## 2019-11-01 DIAGNOSIS — Z00.00 MEDICARE ANNUAL WELLNESS VISIT, SUBSEQUENT: Primary | ICD-10-CM

## 2019-11-01 DIAGNOSIS — E78.5 HYPERLIPIDEMIA, UNSPECIFIED HYPERLIPIDEMIA TYPE: ICD-10-CM

## 2019-11-01 PROCEDURE — G0439 PPPS, SUBSEQ VISIT: HCPCS | Performed by: FAMILY MEDICINE

## 2019-11-01 NOTE — PROGRESS NOTES
The ABCs of the Annual Wellness Visit  Subsequent Medicare Wellness Visit    Chief Complaint   Patient presents with   • Medicare Wellness-subsequent       Subjective   History of Present Illness:  Bella Dukes is a 74 y.o. female who presents for a Subsequent Medicare Wellness Visit.    HEALTH RISK ASSESSMENT    Recent Hospitalizations:  No hospitalization(s) within the last year.    Current Medical Providers:  Patient Care Team:  Ambrose Wilson MD as PCP - General (Family Medicine)    Smoking Status:  Social History     Tobacco Use   Smoking Status Former Smoker   Smokeless Tobacco Never Used   Tobacco Comment    smoked less than 1 pack per day for 5 years       Alcohol Consumption:  Social History     Substance and Sexual Activity   Alcohol Use No       Depression Screen:   PHQ-2/PHQ-9 Depression Screening 11/1/2019   Little interest or pleasure in doing things 0   Feeling down, depressed, or hopeless 3   Trouble falling or staying asleep, or sleeping too much 2   Feeling tired or having little energy 3   Poor appetite or overeating 3   Feeling bad about yourself - or that you are a failure or have let yourself or your family down 0   Trouble concentrating on things, such as reading the newspaper or watching television 0   Moving or speaking so slowly that other people could have noticed. Or the opposite - being so fidgety or restless that you have been moving around a lot more than usual 3   Thoughts that you would be better off dead, or of hurting yourself in some way 0   Total Score 14   If you checked off any problems, how difficult have these problems made it for you to do your work, take care of things at home, or get along with other people? Somewhat difficult       Fall Risk Screen:  STEADI Fall Risk Assessment was completed, and patient is at MODERATE risk for falls. Assessment completed on:8/1/2019    Health Habits and Functional and Cognitive Screening:  Functional & Cognitive Status 11/1/2019   Do  you have difficulty preparing food and eating? No   Do you have difficulty bathing yourself, getting dressed or grooming yourself? Yes   Do you have difficulty using the toilet? No   Do you have difficulty moving around from place to place? No   Do you have trouble with steps or getting out of a bed or a chair? No   Current Diet Unhealthy Diet   Dental Exam Up to date   Eye Exam Up to date   Exercise (times per week) 0 times per week   Current Exercise Activities Include None   Do you need help using the phone?  No   Are you deaf or do you have serious difficulty hearing?  Yes   Do you need help with transportation? No   Do you need help shopping? No   Do you need help preparing meals?  No   Do you need help with housework?  Yes   Do you need help with laundry? No   Do you need help taking your medications? No   Do you need help managing money? No   Do you ever drive or ride in a car without wearing a seat belt? No   Have you felt unusual stress, anger or loneliness in the last month? No   Who do you live with? Spouse   If you need help, do you have trouble finding someone available to you? No   Have you been bothered in the last four weeks by sexual problems? No   Do you have difficulty concentrating, remembering or making decisions? Yes         Does the patient have evidence of cognitive impairment? No    Asprin use counseling:Taking ASA appropriately as indicated    Age-appropriate Screening Schedule:  Refer to the list below for future screening recommendations based on patient's age, sex and/or medical conditions. Orders for these recommended tests are listed in the plan section. The patient has been provided with a written plan.    Health Maintenance   Topic Date Due   • TDAP/TD VACCINES (1 - Tdap) 02/09/1965   • ZOSTER VACCINE (1 of 2) 02/09/1996   • MAMMOGRAM  01/21/2016   • LIPID PANEL  02/26/2017   • PNEUMOCOCCAL VACCINES (65+ LOW/MEDIUM RISK) (2 of 2 - PPSV23) 10/26/2017   • INFLUENZA VACCINE  08/01/2019    • COLONOSCOPY  02/12/2029          The following portions of the patient's history were reviewed and updated as appropriate: allergies, current medications, past family history, past medical history, past social history, past surgical history and problem list.    Outpatient Medications Prior to Visit   Medication Sig Dispense Refill   • amitriptyline (ELAVIL) 10 MG tablet Take 1 tablet by mouth Every Night. 30 tablet 1   • aspirin 81 MG tablet Take by mouth daily.     • atorvastatin (LIPITOR) 40 MG tablet TAKE 1 TABLET BY MOUTH EVERY DAY 90 tablet 1   • azelastine (ASTEPRO) 0.15 % solution nasal spray 2 sprays into the nostril(s) as directed by provider 2 (Two) Times a Day. 1 each 11   • buPROPion XL (WELLBUTRIN XL) 300 MG 24 hr tablet Take 1 tablet by mouth Every Morning. 90 tablet 1   • cetirizine (ZyrTEC) 10 MG tablet Take 1 tablet by mouth daily.     • Cholecalciferol (VITAMIN D-3 PO) Take 1 capsule by mouth daily.     • citalopram (CeleXA) 10 MG tablet TAKE 1 TABLET BY MOUTH TWICE A DAY 60 tablet 5   • clobetasol (TEMOVATE) 0.05 % ointment Apply  topically to the appropriate area as directed 2 (Two) Times a Day.     • Cyanocobalamin (VITAMIN B12 PO) Take 1 tablet by mouth daily.     • docusate sodium (STOOL SOFTENER) 100 MG capsule Take 1 capsule by mouth 3 (three) times a day.     • fluticasone (FLONASE) 50 MCG/ACT nasal spray 2 sprays into the nostril(s) as directed by provider Daily. 1 bottle 11   • Folic Acid-Vit B6-Vit B12 (FA-VITAMIN B-6-VITAMIN B-12) 2.2-25-0.5 MG tablet Take  by mouth.     • losartan (COZAAR) 100 MG tablet TAKE 1 TABLET BY MOUTH EVERY DAY 30 tablet 0   • meloxicam (MOBIC) 15 MG tablet Take 15 mg by mouth Daily.     • montelukast (SINGULAIR) 10 MG tablet TAKE 1 TABLET BY MOUTH DAILY 30 tablet 2   • MULTIPLE VITAMINS-MINERALS-FA PO Take  by mouth.     • multivitamin (THERAGRAN) tablet tablet Take 1 tablet by mouth daily.     • nystatin (MYCOSTATIN) 518872 UNIT/GM powder APPLY TO VULVA  OVER CLOBETASOL CREAM     • pantoprazole (PROTONIX) 40 MG EC tablet Take 1 tablet by mouth daily.     • Pyridoxine HCl (VITAMIN B-6 PO) Take by mouth.     • SYMBICORT 80-4.5 MCG/ACT inhaler Inhale 2 puffs 2 (Two) Times a Day. 1 inhaler 11   • albuterol (PROAIR RESPICLICK) 108 (90 BASE) MCG/ACT inhaler Inhale 2 puffs Every 4 (Four) Hours As Needed for wheezing. 1 inhaler 0   • amLODIPine (NORVASC) 5 MG tablet Take 1 tablet by mouth daily.     • ESTROGENS CONJ SYNTHETIC A PO Take  by mouth. cream       No facility-administered medications prior to visit.        Patient Active Problem List   Diagnosis   • Dilatation of aorta (CMS/[facility])   • Mild intermittent asthma without complication   • Constipation   • Depression   • Gastroesophageal reflux disease without esophagitis   • Mixed hyperlipidemia   • Essential hypertension   • Low back pain   • Pericardial effusion   • Morbid obesity (CMS/[facility])   • Thoracic back pain   • Dysphagia   • Fatigue   • Sleep apnea   • Medicare annual wellness visit, subsequent   • Acute non-recurrent maxillary sinusitis   • Wrist pain, acute, left   • Knee pain   • Choking   • Nasal congestion   • Cough   • PND (post-nasal drip)       Advanced Care Planning:  Patient has an advance directive - a copy has been provided and is visible in patient header, Patient has an advance directive - a copy has not been provided. Have asked the patient to send this to us to add to record    Review of Systems   Constitutional: Negative.    HENT: Negative.    Respiratory: Negative.    Cardiovascular: Negative.    Psychiatric/Behavioral: Positive for dysphoric mood.       Compared to one year ago, the patient feels her physical health is the same.  Compared to one year ago, the patient feels her mental health is the same.    Reviewed chart for potential of high risk medication in the elderly: yes  Reviewed chart for potential of harmful drug interactions in the elderly:yes    Objective         Vitals:     "11/01/19 1404   BP: 138/86   Pulse: 84   Temp: 98.3 °F (36.8 °C)   SpO2: 95%   Weight: 98.5 kg (217 lb 3 oz)   Height: 154.9 cm (60.98\")       Body mass index is 41.06 kg/m².  Discussed the patient's BMI with her. The BMI is above average; BMI management plan is completed.    Physical Exam   Constitutional: She appears well-developed and well-nourished. No distress.   Cardiovascular: Normal rate and regular rhythm. Exam reveals no friction rub.   No murmur heard.  Pulmonary/Chest: Effort normal and breath sounds normal. No stridor. No respiratory distress.   Skin: She is not diaphoretic.   Nursing note and vitals reviewed.            Assessment/Plan   Medicare Risks and Personalized Health Plan  CMS Preventative Services Quick Reference  Fall Risk    The above risks/problems have been discussed with the patient.  Pertinent information has been shared with the patient in the After Visit Summary.  Follow up plans and orders are seen below in the Assessment/Plan Section.    Diagnoses and all orders for this visit:    1. Medicare annual wellness visit, subsequent (Primary)      Follow Up:  Return in about 1 year (around 11/1/2020) for Medicare Wellness.     An After Visit Summary and PPPS were given to the patient.       Pt is seeing a counselor for her depressive sx.      "

## 2019-11-01 NOTE — PATIENT INSTRUCTIONS
Medicare Wellness  Personal Prevention Plan of Service     Date of Office Visit:  2019  Encounter Provider:  Katina Montez MD  Place of Service:  [facility]  Patient Name: Bella Dukes  :  1945    As part of the Medicare Wellness portion of your visit today, we are providing you with this personalized preventive plan of services (PPPS). This plan is based upon recommendations of the [place] Preventive Services Task Force (USPSTF) and the Advisory Committee on Immunization Practices (ACIP).    This lists the preventive care services that should be considered, and provides dates of when you are due. Items listed as completed are up-to-date and do not require any further intervention.    Health Maintenance   Topic Date Due   • TDAP/TD VACCINES (1 - Tdap) 1965   • ZOSTER VACCINE (1 of 2) 1996   • MAMMOGRAM  2016   • LIPID PANEL  2017   • PNEUMOCOCCAL VACCINES (65+ LOW/MEDIUM RISK) (2 of 2 - PPSV23) 10/26/2017   • INFLUENZA VACCINE  2019   • MEDICARE ANNUAL WELLNESS  2020   • COLONOSCOPY  2029   • HEPATITIS C SCREENING  Discontinued       No orders of the defined types were placed in this encounter.      Return in about 1 year (around 2020) for Medicare Wellness.

## 2019-11-09 LAB
ALBUMIN SERPL-MCNC: 4.5 G/DL (ref 3.5–5.2)
ALBUMIN/GLOB SERPL: 1.3 G/DL
ALP SERPL-CCNC: 68 U/L (ref 39–117)
ALT SERPL-CCNC: 19 U/L (ref 1–33)
AST SERPL-CCNC: 24 U/L (ref 1–32)
BASOPHILS # BLD AUTO: 0.03 10*3/MM3 (ref 0–0.2)
BASOPHILS NFR BLD AUTO: 0.6 % (ref 0–1.5)
BILIRUB SERPL-MCNC: 0.4 MG/DL (ref 0.2–1.2)
BUN SERPL-MCNC: 14 MG/DL (ref 8–23)
BUN/CREAT SERPL: 18.4 (ref 7–25)
CALCIUM SERPL-MCNC: 9.7 MG/DL (ref 8.6–10.5)
CHLORIDE SERPL-SCNC: 100 MMOL/L (ref 98–107)
CHOLEST SERPL-MCNC: 201 MG/DL (ref 0–200)
CO2 SERPL-SCNC: 29.2 MMOL/L (ref 22–29)
CREAT SERPL-MCNC: 0.76 MG/DL (ref 0.57–1)
EOSINOPHIL # BLD AUTO: 0.04 10*3/MM3 (ref 0–0.4)
EOSINOPHIL NFR BLD AUTO: 0.8 % (ref 0.3–6.2)
ERYTHROCYTE [DISTWIDTH] IN BLOOD BY AUTOMATED COUNT: 12.9 % (ref 12.3–15.4)
GLOBULIN SER CALC-MCNC: 3.4 GM/DL
GLUCOSE SERPL-MCNC: 89 MG/DL (ref 65–99)
HCT VFR BLD AUTO: 37.1 % (ref 34–46.6)
HDLC SERPL-MCNC: 54 MG/DL (ref 40–60)
HGB BLD-MCNC: 12.9 G/DL (ref 12–15.9)
IMM GRANULOCYTES # BLD AUTO: 0.01 10*3/MM3 (ref 0–0.05)
IMM GRANULOCYTES NFR BLD AUTO: 0.2 % (ref 0–0.5)
LDLC SERPL CALC-MCNC: 106 MG/DL (ref 0–100)
LYMPHOCYTES # BLD AUTO: 1.47 10*3/MM3 (ref 0.7–3.1)
LYMPHOCYTES NFR BLD AUTO: 30.8 % (ref 19.6–45.3)
MCH RBC QN AUTO: 33.5 PG (ref 26.6–33)
MCHC RBC AUTO-ENTMCNC: 34.8 G/DL (ref 31.5–35.7)
MCV RBC AUTO: 96.4 FL (ref 79–97)
MONOCYTES # BLD AUTO: 0.49 10*3/MM3 (ref 0.1–0.9)
MONOCYTES NFR BLD AUTO: 10.3 % (ref 5–12)
NEUTROPHILS # BLD AUTO: 2.74 10*3/MM3 (ref 1.7–7)
NEUTROPHILS NFR BLD AUTO: 57.3 % (ref 42.7–76)
NRBC BLD AUTO-RTO: 0 /100 WBC (ref 0–0.2)
PLATELET # BLD AUTO: 283 10*3/MM3 (ref 140–450)
POTASSIUM SERPL-SCNC: 4.3 MMOL/L (ref 3.5–5.2)
PROT SERPL-MCNC: 7.9 G/DL (ref 6–8.5)
RBC # BLD AUTO: 3.85 10*6/MM3 (ref 3.77–5.28)
SODIUM SERPL-SCNC: 142 MMOL/L (ref 136–145)
TRIGL SERPL-MCNC: 207 MG/DL (ref 0–150)
VLDLC SERPL CALC-MCNC: 41.4 MG/DL
WBC # BLD AUTO: 4.78 10*3/MM3 (ref 3.4–10.8)

## 2019-12-12 ENCOUNTER — APPOINTMENT (OUTPATIENT)
Dept: WOMENS IMAGING | Facility: HOSPITAL | Age: 74
End: 2019-12-12

## 2019-12-12 PROCEDURE — 77063 BREAST TOMOSYNTHESIS BI: CPT | Performed by: RADIOLOGY

## 2019-12-12 PROCEDURE — 77067 SCR MAMMO BI INCL CAD: CPT | Performed by: RADIOLOGY

## 2020-02-12 DIAGNOSIS — F32.A DEPRESSION, UNSPECIFIED DEPRESSION TYPE: ICD-10-CM

## 2020-02-12 RX ORDER — BUPROPION HYDROCHLORIDE 300 MG/1
TABLET ORAL
Qty: 90 TABLET | Refills: 0 | Status: SHIPPED | OUTPATIENT
Start: 2020-02-12 | End: 2020-05-13 | Stop reason: SDUPTHER

## 2020-03-05 RX ORDER — LOSARTAN POTASSIUM 100 MG/1
TABLET ORAL
Qty: 90 TABLET | Refills: 0 | Status: SHIPPED | OUTPATIENT
Start: 2020-03-05 | End: 2020-06-17

## 2020-03-05 RX ORDER — AMITRIPTYLINE HYDROCHLORIDE 10 MG/1
TABLET, FILM COATED ORAL
Qty: 90 TABLET | Refills: 0 | Status: SHIPPED | OUTPATIENT
Start: 2020-03-05 | End: 2020-06-05

## 2020-03-13 RX ORDER — PANTOPRAZOLE SODIUM 40 MG/1
TABLET, DELAYED RELEASE ORAL
Qty: 90 TABLET | Refills: 0 | Status: SHIPPED | OUTPATIENT
Start: 2020-03-13 | End: 2020-06-08

## 2020-03-20 RX ORDER — MELOXICAM 15 MG/1
TABLET ORAL
Qty: 90 TABLET | Refills: 0 | Status: SHIPPED | OUTPATIENT
Start: 2020-03-20 | End: 2020-06-22

## 2020-03-24 RX ORDER — ATORVASTATIN CALCIUM 40 MG/1
TABLET, FILM COATED ORAL
Qty: 90 TABLET | Refills: 0 | Status: SHIPPED | OUTPATIENT
Start: 2020-03-24 | End: 2020-09-18

## 2020-04-13 RX ORDER — MONTELUKAST SODIUM 10 MG/1
TABLET ORAL
Qty: 90 TABLET | Refills: 0 | Status: SHIPPED | OUTPATIENT
Start: 2020-04-13 | End: 2020-04-14

## 2020-04-14 RX ORDER — MONTELUKAST SODIUM 10 MG/1
TABLET ORAL
Qty: 90 TABLET | Refills: 0 | Status: SHIPPED | OUTPATIENT
Start: 2020-04-14 | End: 2020-07-07

## 2020-05-13 ENCOUNTER — TELEMEDICINE (OUTPATIENT)
Dept: FAMILY MEDICINE CLINIC | Facility: CLINIC | Age: 75
End: 2020-05-13

## 2020-05-13 DIAGNOSIS — F32.A DEPRESSION, UNSPECIFIED DEPRESSION TYPE: ICD-10-CM

## 2020-05-13 DIAGNOSIS — M79.601 ARM PAIN, ANTERIOR, RIGHT: Primary | ICD-10-CM

## 2020-05-13 PROCEDURE — 99214 OFFICE O/P EST MOD 30 MIN: CPT | Performed by: FAMILY MEDICINE

## 2020-05-13 RX ORDER — BUPROPION HYDROCHLORIDE 300 MG/1
300 TABLET ORAL EVERY MORNING
Qty: 90 TABLET | Refills: 3 | Status: SHIPPED | OUTPATIENT
Start: 2020-05-13 | End: 2020-12-10 | Stop reason: SDUPTHER

## 2020-05-13 RX ORDER — METHYLPREDNISOLONE 4 MG/1
TABLET ORAL
Qty: 21 EACH | Refills: 0 | Status: SHIPPED | OUTPATIENT
Start: 2020-05-13 | End: 2020-12-10

## 2020-05-13 NOTE — PROGRESS NOTES
Subjective   Bella Dukes is a 75 y.o. female.     Consent given    Visit via [employer]    Time 15 min    CC: rt arm pain    Arm Pain    The incident occurred 5 to 7 days ago. The incident occurred at home (after picking something heavy). There was no injury mechanism. The pain is present in the right shoulder and upper right arm. The quality of the pain is described as aching. The pain is moderate. The pain has been fluctuating since the incident. Associated symptoms include muscle weakness. Pertinent negatives include no chest pain. She has tried acetaminophen and NSAIDs for the symptoms.          The following portions of the patient's history were reviewed and updated as appropriate: allergies, current medications, past family history, past medical history, past social history, past surgical history and problem list.    Past Medical History:   Diagnosis Date   • Allergic rhinitis    • Allergy    • Angina pectoris (CMS/HCC)    • Anxiety    • Arthritis    • Asthma    • Carpal tunnel syndrome    • Chafing    • Chronic lower back pain    • Constipation    • Depression    • Former smoker    • GERD (gastroesophageal reflux disease)    • Hemorrhoids    • High blood pressure    • High cholesterol    • Hoarseness    • Hypertension    • Joint pain     both hips   • Knee pain     soft tissue   • Migraine headache    • Migraines    • Morbid obesity (CMS/HCC)    • Neck pain    • Obesity    • Other urinary problems     temporary urinary loss of control with cough and/or sneeze   • Palpitations    • Plantar fasciitis    • Pneumonia    • Seasonal allergies    • Sleep apnea        Past Surgical History:   Procedure Laterality Date   • BARIATRIC SURGERY     • COLONOSCOPY  11/18/2013   • HYSTERECTOMY      Not due to cancer   • KNEE SURGERY Right    • LAPAROSCOPIC GASTRIC BANDING     • REPLACEMENT TOTAL KNEE     • TONSILLECTOMY         Family History   Problem Relation Age of Onset   • Stroke Mother         ischemic   • Arthritis  Mother    • Stroke Father         ischemic   • Arthritis Father    • Liver cancer Father    • Stroke Paternal Grandfather         ischemic   • Arthritis Paternal Grandfather    • Asthma Brother    • Liver cancer Brother    • COPD Brother    • Arthritis Maternal Grandfather    • Arthritis Paternal Grandmother    • Arthritis Maternal Grandmother        Social History     Socioeconomic History   • Marital status: Unknown     Spouse name: Not on file   • Number of children: Not on file   • Years of education: Not on file   • Highest education level: Not on file   Tobacco Use   • Smoking status: Former Smoker   • Smokeless tobacco: Never Used   • Tobacco comment: smoked less than 1 pack per day for 5 years   Substance and Sexual Activity   • Alcohol use: No   • Drug use: No   • Sexual activity: Defer       Current Outpatient Medications on File Prior to Visit   Medication Sig Dispense Refill   • amitriptyline (ELAVIL) 10 MG tablet TAKE 1 TABLET BY MOUTH AT BEDTIME  90 tablet 0   • aspirin 81 MG tablet Take by mouth daily.     • atorvastatin (LIPITOR) 40 MG tablet TAKE 1 TABLET BY MOUTH ONE TIME A DAY  90 tablet 0   • azelastine (ASTEPRO) 0.15 % solution nasal spray 2 sprays into the nostril(s) as directed by provider 2 (Two) Times a Day. 1 each 11   • buPROPion XL (WELLBUTRIN XL) 300 MG 24 hr tablet TAKE 1 TABLET BY MOUTH IN THE MORNING  90 tablet 0   • cetirizine (ZyrTEC) 10 MG tablet Take 1 tablet by mouth daily.     • Cholecalciferol (VITAMIN D-3 PO) Take 1 capsule by mouth daily.     • citalopram (CeleXA) 10 MG tablet TAKE 1 TABLET BY MOUTH TWICE A DAY 60 tablet 5   • clobetasol (TEMOVATE) 0.05 % ointment Apply  topically to the appropriate area as directed 2 (Two) Times a Day.     • Cyanocobalamin (VITAMIN B12 PO) Take 1 tablet by mouth daily.     • docusate sodium (STOOL SOFTENER) 100 MG capsule Take 1 capsule by mouth 3 (three) times a day.     • fluticasone (FLONASE) 50 MCG/ACT nasal spray 2 sprays into the  nostril(s) as directed by provider Daily. 1 bottle 11   • Folic Acid-Vit B6-Vit B12 (FA-VITAMIN B-6-VITAMIN B-12) 2.2-25-0.5 MG tablet Take  by mouth.     • losartan (COZAAR) 100 MG tablet TAKE 1 TABLET BY MOUTH ONE TIME A DAY  90 tablet 0   • meloxicam (MOBIC) 15 MG tablet TAKE 1 TABLET BY MOUTH ONE TIME A DAY  90 tablet 0   • methylPREDNISolone (MEDROL) 4 MG tablet follow package directions 21 tablet 0   • montelukast (SINGULAIR) 10 MG tablet TAKE 1 TABLET BY MOUTH ONE TIME A DAY AS DIRECTED 90 tablet 0   • MULTIPLE VITAMINS-MINERALS-FA PO Take  by mouth.     • multivitamin (THERAGRAN) tablet tablet Take 1 tablet by mouth daily.     • nystatin (MYCOSTATIN) 962652 UNIT/GM powder APPLY TO VULVA OVER CLOBETASOL CREAM     • pantoprazole (PROTONIX) 40 MG EC tablet TAKE 1 TABLET BY MOUTH ONE TIME A DAY  90 tablet 0   • Pyridoxine HCl (VITAMIN B-6 PO) Take by mouth.     • SYMBICORT 80-4.5 MCG/ACT inhaler Inhale 2 puffs 2 (Two) Times a Day. 1 inhaler 11     No current facility-administered medications on file prior to visit.        Review of Systems   Constitutional: Negative for fatigue.   Eyes: Negative for blurred vision.   Respiratory: Negative for cough, chest tightness and shortness of breath.    Cardiovascular: Negative for chest pain, palpitations and leg swelling.   Musculoskeletal: Positive for arthralgias.        Rt arm pain   Neurological: Negative for dizziness, light-headedness and headache.       Recent Results (from the past 4704 hour(s))   Comprehensive Metabolic Panel    Collection Time: 11/08/19 10:47 AM   Result Value Ref Range    Glucose 89 65 - 99 mg/dL    BUN 14 8 - 23 mg/dL    Creatinine 0.76 0.57 - 1.00 mg/dL    eGFR Non African Am 75 >60 mL/min/1.73    eGFR African Am 90 >60 mL/min/1.73    BUN/Creatinine Ratio 18.4 7.0 - 25.0    Sodium 142 136 - 145 mmol/L    Potassium 4.3 3.5 - 5.2 mmol/L    Chloride 100 98 - 107 mmol/L    Total CO2 29.2 (H) 22.0 - 29.0 mmol/L    Calcium 9.7 8.6 - 10.5 mg/dL     Total Protein 7.9 6.0 - 8.5 g/dL    Albumin 4.50 3.50 - 5.20 g/dL    Globulin 3.4 gm/dL    A/G Ratio 1.3 g/dL    Total Bilirubin 0.4 0.2 - 1.2 mg/dL    Alkaline Phosphatase 68 39 - 117 U/L    AST (SGOT) 24 1 - 32 U/L    ALT (SGPT) 19 1 - 33 U/L   Lipid Panel    Collection Time: 11/08/19 10:47 AM   Result Value Ref Range    Total Cholesterol 201 (H) 0 - 200 mg/dL    Triglycerides 207 (H) 0 - 150 mg/dL    HDL Cholesterol 54 40 - 60 mg/dL    VLDL Cholesterol 41.4 mg/dL    LDL Cholesterol  106 (H) 0 - 100 mg/dL   CBC & Differential    Collection Time: 11/08/19 10:47 AM   Result Value Ref Range    WBC 4.78 3.40 - 10.80 10*3/mm3    RBC 3.85 3.77 - 5.28 10*6/mm3    Hemoglobin 12.9 12.0 - 15.9 g/dL    Hematocrit 37.1 34.0 - 46.6 %    MCV 96.4 79.0 - 97.0 fL    MCH 33.5 (H) 26.6 - 33.0 pg    MCHC 34.8 31.5 - 35.7 g/dL    RDW 12.9 12.3 - 15.4 %    Platelets 283 140 - 450 10*3/mm3    Neutrophil Rel % 57.3 42.7 - 76.0 %    Lymphocyte Rel % 30.8 19.6 - 45.3 %    Monocyte Rel % 10.3 5.0 - 12.0 %    Eosinophil Rel % 0.8 0.3 - 6.2 %    Basophil Rel % 0.6 0.0 - 1.5 %    Neutrophils Absolute 2.74 1.70 - 7.00 10*3/mm3    Lymphocytes Absolute 1.47 0.70 - 3.10 10*3/mm3    Monocytes Absolute 0.49 0.10 - 0.90 10*3/mm3    Eosinophils Absolute 0.04 0.00 - 0.40 10*3/mm3    Basophils Absolute 0.03 0.00 - 0.20 10*3/mm3    Immature Granulocyte Rel % 0.2 0.0 - 0.5 %    Immature Grans Absolute 0.01 0.00 - 0.05 10*3/mm3    nRBC 0.0 0.0 - 0.2 /100 WBC     Objective   There were no vitals filed for this visit.  There is no height or weight on file to calculate BMI.  Physical Exam      Assessment/Plan   Diagnoses and all orders for this visit:    Arm pain, anterior, right    Strain/sprain    Recommended Local heat  Topical cream   continue Tylenol and Mobic

## 2020-06-05 RX ORDER — AMITRIPTYLINE HYDROCHLORIDE 10 MG/1
TABLET, FILM COATED ORAL
Qty: 90 TABLET | Refills: 0 | Status: SHIPPED | OUTPATIENT
Start: 2020-06-05 | End: 2020-09-10

## 2020-06-08 RX ORDER — PANTOPRAZOLE SODIUM 40 MG/1
TABLET, DELAYED RELEASE ORAL
Qty: 90 TABLET | Refills: 0 | Status: SHIPPED | OUTPATIENT
Start: 2020-06-08 | End: 2020-09-18

## 2020-06-17 RX ORDER — LOSARTAN POTASSIUM 100 MG/1
TABLET ORAL
Qty: 90 TABLET | Refills: 0 | Status: SHIPPED | OUTPATIENT
Start: 2020-06-17 | End: 2020-09-18

## 2020-06-17 RX ORDER — CITALOPRAM 20 MG/1
TABLET ORAL
Qty: 90 TABLET | Refills: 0 | Status: SHIPPED | OUTPATIENT
Start: 2020-06-17 | End: 2020-09-23

## 2020-06-22 RX ORDER — MELOXICAM 15 MG/1
TABLET ORAL
Qty: 90 TABLET | Refills: 0 | Status: SHIPPED | OUTPATIENT
Start: 2020-06-22 | End: 2020-09-18

## 2020-07-07 RX ORDER — MONTELUKAST SODIUM 10 MG/1
TABLET ORAL
Qty: 90 TABLET | Refills: 0 | Status: SHIPPED | OUTPATIENT
Start: 2020-07-07 | End: 2020-12-10 | Stop reason: SDUPTHER

## 2020-09-10 RX ORDER — AMITRIPTYLINE HYDROCHLORIDE 10 MG/1
TABLET, FILM COATED ORAL
Qty: 90 TABLET | Refills: 0 | Status: SHIPPED | OUTPATIENT
Start: 2020-09-10 | End: 2020-12-10 | Stop reason: SDUPTHER

## 2020-09-18 RX ORDER — ATORVASTATIN CALCIUM 40 MG/1
TABLET, FILM COATED ORAL
Qty: 90 TABLET | Refills: 0 | Status: SHIPPED | OUTPATIENT
Start: 2020-09-18 | End: 2021-02-09 | Stop reason: SDUPTHER

## 2020-09-18 RX ORDER — MELOXICAM 15 MG/1
TABLET ORAL
Qty: 90 TABLET | Refills: 0 | Status: SHIPPED | OUTPATIENT
Start: 2020-09-18 | End: 2020-12-10 | Stop reason: SDUPTHER

## 2020-09-18 RX ORDER — LOSARTAN POTASSIUM 100 MG/1
TABLET ORAL
Qty: 90 TABLET | Refills: 0 | Status: SHIPPED | OUTPATIENT
Start: 2020-09-18 | End: 2020-12-10 | Stop reason: SDUPTHER

## 2020-09-18 RX ORDER — PANTOPRAZOLE SODIUM 40 MG/1
TABLET, DELAYED RELEASE ORAL
Qty: 90 TABLET | Refills: 0 | Status: SHIPPED | OUTPATIENT
Start: 2020-09-18 | End: 2020-12-28

## 2020-09-23 RX ORDER — CITALOPRAM 20 MG/1
TABLET ORAL
Qty: 90 TABLET | Refills: 0 | Status: SHIPPED | OUTPATIENT
Start: 2020-09-23 | End: 2020-12-10 | Stop reason: SDUPTHER

## 2020-11-05 ENCOUNTER — TELEPHONE (OUTPATIENT)
Dept: FAMILY MEDICINE CLINIC | Facility: CLINIC | Age: 75
End: 2020-11-05

## 2020-11-05 DIAGNOSIS — J30.89 ENVIRONMENTAL AND SEASONAL ALLERGIES: Primary | ICD-10-CM

## 2020-11-05 NOTE — TELEPHONE ENCOUNTER
PATIENT IS REQUESTING REFERRAL TO HAVE HER ASTHMA AND ALLERGY TESTED.      PATIENT CALL BACK #: [phone]

## 2020-11-05 NOTE — TELEPHONE ENCOUNTER
PATIENT STATES THAT [facility] INSURANCE IS REQUESTING A PHYSICIAN AUTHORIZATION ON THE MEDICATION:  montelukast (SINGULAIR) 10 MG tablet [65150] (Order [id number]\    PATIENT CALL BACK: [phone]

## 2020-11-06 NOTE — TELEPHONE ENCOUNTER
no able to leave voicemail. call to tell patient that an referral was put in for asthma and allergy. someone should contact in regards to appointment.

## 2020-11-09 ENCOUNTER — TELEPHONE (OUTPATIENT)
Dept: FAMILY MEDICINE CLINIC | Facility: CLINIC | Age: 75
End: 2020-11-09

## 2020-11-09 PROBLEM — J45.909 ASTHMA: Status: ACTIVE | Noted: 2020-11-09

## 2020-12-10 ENCOUNTER — OFFICE VISIT (OUTPATIENT)
Dept: FAMILY MEDICINE CLINIC | Facility: CLINIC | Age: 75
End: 2020-12-10

## 2020-12-10 VITALS
TEMPERATURE: 97.7 F | DIASTOLIC BLOOD PRESSURE: 80 MMHG | HEART RATE: 89 BPM | OXYGEN SATURATION: 97 % | WEIGHT: 205.4 LBS | BODY MASS INDEX: 38.81 KG/M2 | SYSTOLIC BLOOD PRESSURE: 140 MMHG

## 2020-12-10 DIAGNOSIS — E78.5 DYSLIPIDEMIA: ICD-10-CM

## 2020-12-10 DIAGNOSIS — G43.709 CHRONIC MIGRAINE WITHOUT AURA WITHOUT STATUS MIGRAINOSUS, NOT INTRACTABLE: ICD-10-CM

## 2020-12-10 DIAGNOSIS — F32.A DEPRESSION, UNSPECIFIED DEPRESSION TYPE: ICD-10-CM

## 2020-12-10 DIAGNOSIS — M19.90 ARTHRITIS: ICD-10-CM

## 2020-12-10 DIAGNOSIS — I10 ESSENTIAL HYPERTENSION: ICD-10-CM

## 2020-12-10 DIAGNOSIS — Z00.00 MEDICARE ANNUAL WELLNESS VISIT, SUBSEQUENT: Primary | ICD-10-CM

## 2020-12-10 DIAGNOSIS — R09.81 NASAL CONGESTION: ICD-10-CM

## 2020-12-10 DIAGNOSIS — T78.40XD ALLERGY, SUBSEQUENT ENCOUNTER: ICD-10-CM

## 2020-12-10 PROBLEM — T78.40XA ALLERGIES: Status: ACTIVE | Noted: 2020-12-10

## 2020-12-10 PROCEDURE — 99214 OFFICE O/P EST MOD 30 MIN: CPT | Performed by: FAMILY MEDICINE

## 2020-12-10 PROCEDURE — 1126F AMNT PAIN NOTED NONE PRSNT: CPT | Performed by: FAMILY MEDICINE

## 2020-12-10 PROCEDURE — 1159F MED LIST DOCD IN RCRD: CPT | Performed by: FAMILY MEDICINE

## 2020-12-10 PROCEDURE — G0439 PPPS, SUBSEQ VISIT: HCPCS | Performed by: FAMILY MEDICINE

## 2020-12-10 PROCEDURE — 1170F FXNL STATUS ASSESSED: CPT | Performed by: FAMILY MEDICINE

## 2020-12-10 RX ORDER — LOSARTAN POTASSIUM 100 MG/1
100 TABLET ORAL DAILY
Qty: 90 TABLET | Refills: 3 | Status: SHIPPED | OUTPATIENT
Start: 2020-12-10 | End: 2021-11-26

## 2020-12-10 RX ORDER — AMITRIPTYLINE HYDROCHLORIDE 10 MG/1
10 TABLET, FILM COATED ORAL
Qty: 90 TABLET | Refills: 3 | Status: SHIPPED | OUTPATIENT
Start: 2020-12-10 | End: 2021-12-27

## 2020-12-10 RX ORDER — MELOXICAM 15 MG/1
15 TABLET ORAL DAILY
Qty: 90 TABLET | Refills: 3 | Status: SHIPPED | OUTPATIENT
Start: 2020-12-10 | End: 2022-01-17

## 2020-12-10 RX ORDER — CITALOPRAM 20 MG/1
20 TABLET ORAL DAILY
Qty: 90 TABLET | Refills: 3 | Status: SHIPPED | OUTPATIENT
Start: 2020-12-10 | End: 2021-06-01 | Stop reason: SDUPTHER

## 2020-12-10 RX ORDER — BUPROPION HYDROCHLORIDE 450 MG/1
450 TABLET, FILM COATED, EXTENDED RELEASE ORAL EVERY MORNING
Qty: 90 TABLET | Refills: 3 | Status: SHIPPED | OUTPATIENT
Start: 2020-12-10 | End: 2021-01-06

## 2020-12-10 RX ORDER — MONTELUKAST SODIUM 10 MG/1
10 TABLET ORAL DAILY
Qty: 90 TABLET | Refills: 3 | Status: SHIPPED | OUTPATIENT
Start: 2020-12-10 | End: 2022-01-27

## 2020-12-10 NOTE — PROGRESS NOTES
Subjective   Bella Dukes is a 75 y.o. female.     Chief Complaint   Patient presents with   • Medicare Wellness-subsequent       History of Present Illness   Follow-up on depression.  Getting worse secondary to family issues.  She is doing Wellbutrin and Celexa at this time.  The Wellbutrin will be upgraded today.  Migraines is stable on current medications.  Hypertension stable on current medications.  All medicines were reviewed    The following portions of the patient's history were reviewed and updated as appropriate: allergies, current medications, past family history, past medical history, past social history, past surgical history and problem list.    Past Medical History:   Diagnosis Date   • Allergic rhinitis    • Allergy    • Angina pectoris (CMS/HCC)    • Anxiety    • Arthritis    • Asthma    • Carpal tunnel syndrome    • Chafing    • Chronic lower back pain    • Constipation    • Depression    • Former smoker    • GERD (gastroesophageal reflux disease)    • Hemorrhoids    • High blood pressure    • High cholesterol    • Hoarseness    • Hypertension    • Joint pain     both hips   • Knee pain     soft tissue   • Migraine headache    • Migraines    • Morbid obesity (CMS/HCC)    • Neck pain    • Obesity    • Other urinary problems     temporary urinary loss of control with cough and/or sneeze   • Palpitations    • Plantar fasciitis    • Pneumonia    • Seasonal allergies    • Sleep apnea        Past Surgical History:   Procedure Laterality Date   • BARIATRIC SURGERY     • COLONOSCOPY  11/18/2013   • HYSTERECTOMY      Not due to cancer   • KNEE SURGERY Right    • LAPAROSCOPIC GASTRIC BANDING     • REPLACEMENT TOTAL KNEE     • TONSILLECTOMY         Family History   Problem Relation Age of Onset   • Stroke Mother         ischemic   • Arthritis Mother    • Stroke Father         ischemic   • Arthritis Father    • Liver cancer Father    • Stroke Paternal Grandfather         ischemic   • Arthritis Paternal  Grandfather    • Asthma Brother    • Liver cancer Brother    • COPD Brother    • Arthritis Maternal Grandfather    • Arthritis Paternal Grandmother    • Arthritis Maternal Grandmother        Social History     Socioeconomic History   • Marital status: Unknown     Spouse name: Not on file   • Number of children: Not on file   • Years of education: Not on file   • Highest education level: Not on file   Tobacco Use   • Smoking status: Former Smoker   • Smokeless tobacco: Never Used   • Tobacco comment: smoked less than 1 pack per day for 5 years   Substance and Sexual Activity   • Alcohol use: No   • Drug use: No   • Sexual activity: Defer       Current Outpatient Medications on File Prior to Visit   Medication Sig Dispense Refill   • aspirin 81 MG tablet Take by mouth daily.     • atorvastatin (LIPITOR) 40 MG tablet TAKE 1 TABLET BY MOUTH ONE TIME A DAY  90 tablet 0   • azelastine (ASTEPRO) 0.15 % solution nasal spray 2 sprays into the nostril(s) as directed by provider 2 (Two) Times a Day. 1 each 11   • cetirizine (ZyrTEC) 10 MG tablet Take 1 tablet by mouth daily.     • clobetasol (TEMOVATE) 0.05 % ointment Apply  topically to the appropriate area as directed 2 (Two) Times a Day.     • Cyanocobalamin (VITAMIN B12 PO) Take 1 tablet by mouth daily.     • MULTIPLE VITAMINS-MINERALS-FA PO Take  by mouth.     • multivitamin (THERAGRAN) tablet tablet Take 1 tablet by mouth daily.     • nystatin (MYCOSTATIN) 287195 UNIT/GM powder APPLY TO VULVA OVER CLOBETASOL CREAM     • pantoprazole (PROTONIX) 40 MG EC tablet TAKE 1 TABLET BY MOUTH ONE TIME A DAY  90 tablet 0   • Pyridoxine HCl (VITAMIN B-6 PO) Take by mouth.     • [DISCONTINUED] amitriptyline (ELAVIL) 10 MG tablet TAKE 1 TABLET BY MOUTH AT BEDTIME  90 tablet 0   • [DISCONTINUED] buPROPion XL (WELLBUTRIN XL) 300 MG 24 hr tablet Take 1 tablet by mouth Every Morning. 90 tablet 3   • [DISCONTINUED] citalopram (CeleXA) 20 MG tablet TAKE 1 TABLET BY MOUTH ONE TIME A DAY  90  tablet 0   • [DISCONTINUED] losartan (COZAAR) 100 MG tablet TAKE 1 TABLET BY MOUTH ONE TIME A DAY  90 tablet 0   • [DISCONTINUED] meloxicam (MOBIC) 15 MG tablet TAKE 1 TABLET BY MOUTH ONE TIME A DAY  90 tablet 0   • [DISCONTINUED] montelukast (SINGULAIR) 10 MG tablet TAKE 1 TABLET BY MOUTH ONE TIME A DAY AS DIRECTED 90 tablet 0   • Cholecalciferol (VITAMIN D-3 PO) Take 1 capsule by mouth daily.     • citalopram (CeleXA) 10 MG tablet TAKE 1 TABLET BY MOUTH TWICE A DAY 60 tablet 5   • docusate sodium (STOOL SOFTENER) 100 MG capsule Take 1 capsule by mouth 3 (three) times a day.     • fluticasone (FLONASE) 50 MCG/ACT nasal spray 2 sprays into the nostril(s) as directed by provider Daily. 1 bottle 11   • Folic Acid-Vit B6-Vit B12 (FA-VITAMIN B-6-VITAMIN B-12) 2.2-25-0.5 MG tablet Take  by mouth.     • SYMBICORT 80-4.5 MCG/ACT inhaler Inhale 2 puffs 2 (Two) Times a Day. 1 inhaler 11   • [DISCONTINUED] methylPREDNISolone (MEDROL) 4 MG tablet follow package directions 21 tablet 0   • [DISCONTINUED] methylPREDNISolone (MEDROL, LANDON,) 4 MG tablet Take as directed on package instructions. 21 each 0     No current facility-administered medications on file prior to visit.        Review of Systems   Constitutional: Negative.  Negative for fatigue.   Eyes: Negative for blurred vision.   Respiratory: Negative for cough, chest tightness and shortness of breath.    Cardiovascular: Negative for chest pain, palpitations and leg swelling.   Neurological: Negative for dizziness, light-headedness and headache.   Psychiatric/Behavioral: Positive for depressed mood.       No results found for this or any previous visit (from the past 4704 hour(s)).  Objective   Vitals:    12/10/20 0830   BP: 140/80   Pulse: 89   Temp: 97.7 °F (36.5 °C)   TempSrc: Temporal   SpO2: 97%   Weight: 93.2 kg (205 lb 6.4 oz)     Body mass index is 38.81 kg/m².  Physical Exam  Vitals signs and nursing note reviewed.   Constitutional:       Appearance: She is obese.    Neurological:      Mental Status: She is alert.   Psychiatric:      Comments: depressed           Diagnoses and all orders for this visit:    1. Medicare annual wellness visit, subsequent (Primary)    2. Depression, unspecified depression type  -     buPROPion XL (FORFIVO XL) 450 MG 24 hr tablet; Take 1 tablet by mouth Every Morning.  Dispense: 90 tablet; Refill: 3  -     citalopram (CeleXA) 20 MG tablet; Take 1 tablet by mouth Daily. 200001  Dispense: 90 tablet; Refill: 3    3. Essential hypertension  -     losartan (COZAAR) 100 MG tablet; Take 1 tablet by mouth Daily. 200001  Dispense: 90 tablet; Refill: 3  -     Comprehensive Metabolic Panel  -     Lipid Panel  -     CBC & Differential    4. Nasal congestion    5. Arthritis  -     meloxicam (MOBIC) 15 MG tablet; Take 1 tablet by mouth Daily. 200001  Dispense: 90 tablet; Refill: 3    6. Allergy, subsequent encounter  -     montelukast (SINGULAIR) 10 MG tablet; Take 1 tablet by mouth Daily.  Dispense: 90 tablet; Refill: 3  -     Ambulatory Referral to Allergy    7. Chronic migraine without aura without status migrainosus, not intractable  -     amitriptyline (ELAVIL) 10 MG tablet; Take 1 tablet by mouth every night at bedtime.  Dispense: 90 tablet; Refill: 3    8. Dyslipidemia  -     Comprehensive Metabolic Panel  -     Lipid Panel    Return in about 2 months (around 2/10/2021) for depression.

## 2020-12-10 NOTE — PROGRESS NOTES
The ABCs of the Annual Wellness Visit  Subsequent Medicare Wellness Visit    Chief Complaint   Patient presents with   • Medicare Wellness-subsequent       Subjective   History of Present Illness:  Bella Dukes is a 75 y.o. female who presents for a Subsequent Medicare Wellness Visit.    HEALTH RISK ASSESSMENT    Recent Hospitalizations:  No hospitalization(s) within the last year.    Current Medical Providers:  Patient Care Team:  Katina Montez MD as PCP - General (Family Medicine)    Smoking Status:  Social History     Tobacco Use   Smoking Status Former Smoker   Smokeless Tobacco Never Used   Tobacco Comment    smoked less than 1 pack per day for 5 years       Alcohol Consumption:  Social History     Substance and Sexual Activity   Alcohol Use No       Depression Screen:   PHQ-2/PHQ-9 Depression Screening 12/10/2020   Little interest or pleasure in doing things 0   Feeling down, depressed, or hopeless 3   Trouble falling or staying asleep, or sleeping too much 0   Feeling tired or having little energy 3   Poor appetite or overeating 0   Feeling bad about yourself - or that you are a failure or have let yourself or your family down 0   Trouble concentrating on things, such as reading the newspaper or watching television 0   Moving or speaking so slowly that other people could have noticed. Or the opposite - being so fidgety or restless that you have been moving around a lot more than usual 0   Thoughts that you would be better off dead, or of hurting yourself in some way 0   Total Score 6   If you checked off any problems, how difficult have these problems made it for you to do your work, take care of things at home, or get along with other people? Not difficult at all       Fall Risk Screen:  STEADI Fall Risk Assessment was completed, and patient is at LOW risk for falls.Assessment completed on:12/10/2020    Health Habits and Functional and Cognitive Screening:  Functional & Cognitive Status 12/10/2020   Do  you have difficulty preparing food and eating? No   Do you have difficulty bathing yourself, getting dressed or grooming yourself? No   Do you have difficulty using the toilet? No   Do you have difficulty moving around from place to place? No   Do you have trouble with steps or getting out of a bed or a chair? No   Current Diet Well Balanced Diet   Dental Exam Up to date   Eye Exam Up to date   Exercise (times per week) 0 times per week        Exercise Frequency Comment -   Current Exercise Activities Include None   Do you need help using the phone?  No   Are you deaf or do you have serious difficulty hearing?  Yes   Do you need help with transportation? No   Do you need help shopping? No   Do you need help preparing meals?  No   Do you need help with housework?  No   Do you need help with laundry? No   Do you need help taking your medications? No   Do you need help managing money? No   Do you ever drive or ride in a car without wearing a seat belt? No   Have you felt unusual stress, anger or loneliness in the last month? Yes   Who do you live with? Spouse   If you need help, do you have trouble finding someone available to you? No   Have you been bothered in the last four weeks by sexual problems? -   Do you have difficulty concentrating, remembering or making decisions? Yes         Does the patient have evidence of cognitive impairment? No    Asprin use counseling:Taking ASA appropriately as indicated    Age-appropriate Screening Schedule:  Refer to the list below for future screening recommendations based on patient's age, sex and/or medical conditions. Orders for these recommended tests are listed in the plan section. The patient has been provided with a written plan.    Health Maintenance   Topic Date Due   • TDAP/TD VACCINES (1 - Tdap) 02/09/1965   • ZOSTER VACCINE (1 of 2) 02/09/1996   • MAMMOGRAM  01/21/2016   • LIPID PANEL  11/08/2020   • COLONOSCOPY  02/12/2029   • INFLUENZA VACCINE  Completed          The  following portions of the patient's history were reviewed and updated as appropriate: allergies, current medications, past family history, past medical history, past social history, past surgical history and problem list.    Outpatient Medications Prior to Visit   Medication Sig Dispense Refill   • aspirin 81 MG tablet Take by mouth daily.     • atorvastatin (LIPITOR) 40 MG tablet TAKE 1 TABLET BY MOUTH ONE TIME A DAY  90 tablet 0   • azelastine (ASTEPRO) 0.15 % solution nasal spray 2 sprays into the nostril(s) as directed by provider 2 (Two) Times a Day. 1 each 11   • cetirizine (ZyrTEC) 10 MG tablet Take 1 tablet by mouth daily.     • clobetasol (TEMOVATE) 0.05 % ointment Apply  topically to the appropriate area as directed 2 (Two) Times a Day.     • Cyanocobalamin (VITAMIN B12 PO) Take 1 tablet by mouth daily.     • MULTIPLE VITAMINS-MINERALS-FA PO Take  by mouth.     • multivitamin (THERAGRAN) tablet tablet Take 1 tablet by mouth daily.     • nystatin (MYCOSTATIN) 888362 UNIT/GM powder APPLY TO VULVA OVER CLOBETASOL CREAM     • pantoprazole (PROTONIX) 40 MG EC tablet TAKE 1 TABLET BY MOUTH ONE TIME A DAY  90 tablet 0   • Pyridoxine HCl (VITAMIN B-6 PO) Take by mouth.     • amitriptyline (ELAVIL) 10 MG tablet TAKE 1 TABLET BY MOUTH AT BEDTIME  90 tablet 0   • buPROPion XL (WELLBUTRIN XL) 300 MG 24 hr tablet Take 1 tablet by mouth Every Morning. 90 tablet 3   • citalopram (CeleXA) 20 MG tablet TAKE 1 TABLET BY MOUTH ONE TIME A DAY  90 tablet 0   • losartan (COZAAR) 100 MG tablet TAKE 1 TABLET BY MOUTH ONE TIME A DAY  90 tablet 0   • meloxicam (MOBIC) 15 MG tablet TAKE 1 TABLET BY MOUTH ONE TIME A DAY  90 tablet 0   • montelukast (SINGULAIR) 10 MG tablet TAKE 1 TABLET BY MOUTH ONE TIME A DAY AS DIRECTED 90 tablet 0   • Cholecalciferol (VITAMIN D-3 PO) Take 1 capsule by mouth daily.     • citalopram (CeleXA) 10 MG tablet TAKE 1 TABLET BY MOUTH TWICE A DAY 60 tablet 5   • docusate sodium (STOOL SOFTENER) 100 MG  capsule Take 1 capsule by mouth 3 (three) times a day.     • fluticasone (FLONASE) 50 MCG/ACT nasal spray 2 sprays into the nostril(s) as directed by provider Daily. 1 bottle 11   • Folic Acid-Vit B6-Vit B12 (FA-VITAMIN B-6-VITAMIN B-12) 2.2-25-0.5 MG tablet Take  by mouth.     • SYMBICORT 80-4.5 MCG/ACT inhaler Inhale 2 puffs 2 (Two) Times a Day. 1 inhaler 11   • methylPREDNISolone (MEDROL) 4 MG tablet follow package directions 21 tablet 0   • methylPREDNISolone (MEDROL, LANDON,) 4 MG tablet Take as directed on package instructions. 21 each 0     No facility-administered medications prior to visit.        Patient Active Problem List   Diagnosis   • Dilatation of aorta (CMS/HCC)   • Mild intermittent asthma without complication   • Constipation   • Depression   • Gastroesophageal reflux disease without esophagitis   • Mixed hyperlipidemia   • Essential hypertension   • Low back pain   • Pericardial effusion   • Morbid obesity (CMS/HCC)   • Thoracic back pain   • Dysphagia   • Fatigue   • Sleep apnea   • Medicare annual wellness visit, subsequent   • Acute non-recurrent maxillary sinusitis   • Arm pain, anterior, right   • Knee pain   • Choking   • Nasal congestion   • Cough   • PND (post-nasal drip)   • Asthma   • Arthritis   • Allergies   • Chronic migraine without aura without status migrainosus, not intractable       Advanced Care Planning:  ACP discussion was held with the patient during this visit. Patient has an advance directive in EMR which is still valid.     Review of Systems   Constitutional: Negative.        Compared to one year ago, the patient feels her physical health is the same.  Compared to one year ago, the patient feels her mental health is worse.    Reviewed chart for potential of high risk medication in the elderly: yes  Reviewed chart for potential of harmful drug interactions in the elderly:yes    Objective         Vitals:    12/10/20 0830   BP: 140/80   Pulse: 89   Temp: 97.7 °F (36.5 °C)    TempSrc: Temporal   SpO2: 97%   Weight: 93.2 kg (205 lb 6.4 oz)       Body mass index is 38.81 kg/m².  Discussed the patient's BMI with her. The BMI is above average; BMI management plan is completed.    Physical Exam  Vitals signs and nursing note reviewed.   Constitutional:       Appearance: She is obese.   Neurological:      Mental Status: She is alert.               Assessment/Plan   Medicare Risks and Personalized Health Plan  CMS Preventative Services Quick Reference  Fall Risk    The above risks/problems have been discussed with the patient.  Pertinent information has been shared with the patient in the After Visit Summary.  Follow up plans and orders are seen below in the Assessment/Plan Section.    Diagnoses and all orders for this visit:    1. Medicare annual wellness visit, subsequent (Primary)    2. Depression, unspecified depression type  -     buPROPion XL (FORFIVO XL) 450 MG 24 hr tablet; Take 1 tablet by mouth Every Morning.  Dispense: 90 tablet; Refill: 3  -     citalopram (CeleXA) 20 MG tablet; Take 1 tablet by mouth Daily. 200001  Dispense: 90 tablet; Refill: 3    3. Essential hypertension  -     losartan (COZAAR) 100 MG tablet; Take 1 tablet by mouth Daily. 200001  Dispense: 90 tablet; Refill: 3    4. Nasal congestion    5. Arthritis  -     meloxicam (MOBIC) 15 MG tablet; Take 1 tablet by mouth Daily. 200001  Dispense: 90 tablet; Refill: 3    6. Allergy, subsequent encounter  -     montelukast (SINGULAIR) 10 MG tablet; Take 1 tablet by mouth Daily.  Dispense: 90 tablet; Refill: 3    7. Chronic migraine without aura without status migrainosus, not intractable  -     amitriptyline (ELAVIL) 10 MG tablet; Take 1 tablet by mouth every night at bedtime.  Dispense: 90 tablet; Refill: 3      Follow Up:     Return in about 1 year (around 12/10/2021) for Medicare Wellness.  Return in about 2 months (around 2/10/2021) for depression.    An After Visit Summary and PPPS were given to the patient.

## 2020-12-11 LAB
ALBUMIN SERPL-MCNC: 4.3 G/DL (ref 3.5–5.2)
ALBUMIN/GLOB SERPL: 1.3 G/DL
ALP SERPL-CCNC: 73 U/L (ref 39–117)
ALT SERPL-CCNC: 20 U/L (ref 1–33)
AST SERPL-CCNC: 21 U/L (ref 1–32)
BASOPHILS # BLD AUTO: 0.03 10*3/MM3 (ref 0–0.2)
BASOPHILS NFR BLD AUTO: 0.7 % (ref 0–1.5)
BILIRUB SERPL-MCNC: 0.3 MG/DL (ref 0–1.2)
BUN SERPL-MCNC: 12 MG/DL (ref 8–23)
BUN/CREAT SERPL: 13 (ref 7–25)
CALCIUM SERPL-MCNC: 9.8 MG/DL (ref 8.6–10.5)
CHLORIDE SERPL-SCNC: 98 MMOL/L (ref 98–107)
CHOLEST SERPL-MCNC: 162 MG/DL (ref 0–200)
CO2 SERPL-SCNC: 29.8 MMOL/L (ref 22–29)
CREAT SERPL-MCNC: 0.92 MG/DL (ref 0.57–1)
EOSINOPHIL # BLD AUTO: 0.07 10*3/MM3 (ref 0–0.4)
EOSINOPHIL NFR BLD AUTO: 1.7 % (ref 0.3–6.2)
ERYTHROCYTE [DISTWIDTH] IN BLOOD BY AUTOMATED COUNT: 12.7 % (ref 12.3–15.4)
GLOBULIN SER CALC-MCNC: 3.3 GM/DL
GLUCOSE SERPL-MCNC: 85 MG/DL (ref 65–99)
HCT VFR BLD AUTO: 39.4 % (ref 34–46.6)
HDLC SERPL-MCNC: 49 MG/DL (ref 40–60)
HGB BLD-MCNC: 13 G/DL (ref 12–15.9)
IMM GRANULOCYTES # BLD AUTO: 0.01 10*3/MM3 (ref 0–0.05)
IMM GRANULOCYTES NFR BLD AUTO: 0.2 % (ref 0–0.5)
LDLC SERPL CALC-MCNC: 79 MG/DL (ref 0–100)
LYMPHOCYTES # BLD AUTO: 1.46 10*3/MM3 (ref 0.7–3.1)
LYMPHOCYTES NFR BLD AUTO: 35 % (ref 19.6–45.3)
MCH RBC QN AUTO: 32.5 PG (ref 26.6–33)
MCHC RBC AUTO-ENTMCNC: 33 G/DL (ref 31.5–35.7)
MCV RBC AUTO: 98.5 FL (ref 79–97)
MONOCYTES # BLD AUTO: 0.45 10*3/MM3 (ref 0.1–0.9)
MONOCYTES NFR BLD AUTO: 10.8 % (ref 5–12)
NEUTROPHILS # BLD AUTO: 2.15 10*3/MM3 (ref 1.7–7)
NEUTROPHILS NFR BLD AUTO: 51.6 % (ref 42.7–76)
NRBC BLD AUTO-RTO: 0 /100 WBC (ref 0–0.2)
PLATELET # BLD AUTO: 315 10*3/MM3 (ref 140–450)
POTASSIUM SERPL-SCNC: 4.6 MMOL/L (ref 3.5–5.2)
PROT SERPL-MCNC: 7.6 G/DL (ref 6–8.5)
RBC # BLD AUTO: 4 10*6/MM3 (ref 3.77–5.28)
SODIUM SERPL-SCNC: 137 MMOL/L (ref 136–145)
TRIGL SERPL-MCNC: 202 MG/DL (ref 0–150)
VLDLC SERPL CALC-MCNC: 34 MG/DL (ref 5–40)
WBC # BLD AUTO: 4.17 10*3/MM3 (ref 3.4–10.8)

## 2020-12-15 ENCOUNTER — APPOINTMENT (OUTPATIENT)
Dept: WOMENS IMAGING | Facility: HOSPITAL | Age: 75
End: 2020-12-15

## 2020-12-15 PROCEDURE — 77067 SCR MAMMO BI INCL CAD: CPT | Performed by: RADIOLOGY

## 2020-12-15 PROCEDURE — 77063 BREAST TOMOSYNTHESIS BI: CPT | Performed by: RADIOLOGY

## 2020-12-28 RX ORDER — PANTOPRAZOLE SODIUM 40 MG/1
TABLET, DELAYED RELEASE ORAL
Qty: 90 TABLET | Refills: 0 | Status: SHIPPED | OUTPATIENT
Start: 2020-12-28 | End: 2021-02-09 | Stop reason: SDUPTHER

## 2021-01-06 DIAGNOSIS — F32.A DEPRESSION, UNSPECIFIED DEPRESSION TYPE: ICD-10-CM

## 2021-01-06 RX ORDER — BUPROPION HYDROCHLORIDE 150 MG/1
TABLET ORAL
Qty: 90 TABLET | Refills: 0 | Status: SHIPPED | OUTPATIENT
Start: 2021-01-06 | End: 2021-02-09

## 2021-01-06 RX ORDER — BUPROPION HYDROCHLORIDE 300 MG/1
TABLET ORAL
Qty: 90 TABLET | Refills: 0 | Status: SHIPPED | OUTPATIENT
Start: 2021-01-06 | End: 2021-02-09

## 2021-02-09 ENCOUNTER — OFFICE VISIT (OUTPATIENT)
Dept: FAMILY MEDICINE CLINIC | Facility: CLINIC | Age: 76
End: 2021-02-09

## 2021-02-09 VITALS
BODY MASS INDEX: 39.65 KG/M2 | WEIGHT: 210 LBS | HEIGHT: 61 IN | SYSTOLIC BLOOD PRESSURE: 136 MMHG | OXYGEN SATURATION: 99 % | TEMPERATURE: 97.1 F | HEART RATE: 97 BPM | DIASTOLIC BLOOD PRESSURE: 81 MMHG

## 2021-02-09 DIAGNOSIS — I10 ESSENTIAL HYPERTENSION: Primary | ICD-10-CM

## 2021-02-09 DIAGNOSIS — K21.9 GASTROESOPHAGEAL REFLUX DISEASE WITHOUT ESOPHAGITIS: ICD-10-CM

## 2021-02-09 DIAGNOSIS — E78.5 HYPERLIPIDEMIA, UNSPECIFIED HYPERLIPIDEMIA TYPE: ICD-10-CM

## 2021-02-09 DIAGNOSIS — F32.A DEPRESSION, UNSPECIFIED DEPRESSION TYPE: ICD-10-CM

## 2021-02-09 PROCEDURE — 99214 OFFICE O/P EST MOD 30 MIN: CPT | Performed by: FAMILY MEDICINE

## 2021-02-09 RX ORDER — ATORVASTATIN CALCIUM 40 MG/1
40 TABLET, FILM COATED ORAL DAILY
Qty: 90 TABLET | Refills: 3 | Status: SHIPPED | OUTPATIENT
Start: 2021-02-09 | End: 2022-03-09

## 2021-02-09 RX ORDER — BUPROPION HYDROCHLORIDE 450 MG/1
450 TABLET, FILM COATED, EXTENDED RELEASE ORAL DAILY
Qty: 90 TABLET | Refills: 3 | Status: SHIPPED | OUTPATIENT
Start: 2021-02-09 | End: 2021-06-01

## 2021-02-09 RX ORDER — PANTOPRAZOLE SODIUM 40 MG/1
40 TABLET, DELAYED RELEASE ORAL DAILY
Qty: 90 TABLET | Refills: 3 | Status: SHIPPED | OUTPATIENT
Start: 2021-02-09 | End: 2022-03-24

## 2021-02-09 NOTE — PROGRESS NOTES
Subjective   Bella Dukes is a 76 y.o. female.     Chief Complaint   Patient presents with   • Hypertension   • Follow-up       History of Present Illness       The following portions of the patient's history were reviewed and updated as appropriate: allergies, current medications, past family history, past medical history, past social history, past surgical history and problem list.    Past Medical History:   Diagnosis Date   • Allergic rhinitis    • Allergy    • Angina pectoris (CMS/HCC)    • Anxiety    • Arthritis    • Asthma    • Carpal tunnel syndrome    • Chafing    • Chronic lower back pain    • Constipation    • Depression    • Former smoker    • GERD (gastroesophageal reflux disease)    • Hemorrhoids    • High blood pressure    • High cholesterol    • Hoarseness    • Hypertension    • Joint pain     both hips   • Knee pain     soft tissue   • Migraine headache    • Migraines    • Morbid obesity (CMS/HCC)    • Neck pain    • Obesity    • Other urinary problems     temporary urinary loss of control with cough and/or sneeze   • Palpitations    • Plantar fasciitis    • Pneumonia    • Seasonal allergies    • Sleep apnea        Past Surgical History:   Procedure Laterality Date   • BARIATRIC SURGERY     • COLONOSCOPY  11/18/2013   • HYSTERECTOMY      Not due to cancer   • KNEE SURGERY Right    • LAPAROSCOPIC GASTRIC BANDING     • REPLACEMENT TOTAL KNEE     • TONSILLECTOMY         Family History   Problem Relation Age of Onset   • Stroke Mother         ischemic   • Arthritis Mother    • Stroke Father         ischemic   • Arthritis Father    • Liver cancer Father    • Stroke Paternal Grandfather         ischemic   • Arthritis Paternal Grandfather    • Asthma Brother    • Liver cancer Brother    • COPD Brother    • Arthritis Maternal Grandfather    • Arthritis Paternal Grandmother    • Arthritis Maternal Grandmother        Social History     Socioeconomic History   • Marital status: Unknown     Spouse name: Not on  file   • Number of children: Not on file   • Years of education: Not on file   • Highest education level: Not on file   Tobacco Use   • Smoking status: Former Smoker   • Smokeless tobacco: Never Used   • Tobacco comment: smoked less than 1 pack per day for 5 years   Substance and Sexual Activity   • Alcohol use: No   • Drug use: No   • Sexual activity: Defer       Current Outpatient Medications on File Prior to Visit   Medication Sig Dispense Refill   • amitriptyline (ELAVIL) 10 MG tablet Take 1 tablet by mouth every night at bedtime. 90 tablet 3   • aspirin 81 MG tablet Take by mouth daily.     • cetirizine (ZyrTEC) 10 MG tablet Take 1 tablet by mouth daily.     • Cholecalciferol (VITAMIN D-3 PO) Take 1 capsule by mouth daily.     • citalopram (CeleXA) 20 MG tablet Take 1 tablet by mouth Daily. 200001 90 tablet 3   • clobetasol (TEMOVATE) 0.05 % ointment Apply  topically to the appropriate area as directed 2 (Two) Times a Day.     • Cyanocobalamin (VITAMIN B12 PO) Take 1 tablet by mouth daily.     • Folic Acid-Vit B6-Vit B12 (FA-VITAMIN B-6-VITAMIN B-12) 2.2-25-0.5 MG tablet Take  by mouth.     • losartan (COZAAR) 100 MG tablet Take 1 tablet by mouth Daily. 200001 90 tablet 3   • meloxicam (MOBIC) 15 MG tablet Take 1 tablet by mouth Daily. 200001 90 tablet 3   • montelukast (SINGULAIR) 10 MG tablet Take 1 tablet by mouth Daily. 90 tablet 3   • MULTIPLE VITAMINS-MINERALS-FA PO Take  by mouth.     • multivitamin (THERAGRAN) tablet tablet Take 1 tablet by mouth daily.     • Pyridoxine HCl (VITAMIN B-6 PO) Take by mouth.     • [DISCONTINUED] atorvastatin (LIPITOR) 40 MG tablet TAKE 1 TABLET BY MOUTH ONE TIME A DAY  90 tablet 0   • [DISCONTINUED] azelastine (ASTEPRO) 0.15 % solution nasal spray 2 sprays into the nostril(s) as directed by provider 2 (Two) Times a Day. 1 each 11   • [DISCONTINUED] buPROPion XL (Wellbutrin XL) 150 MG 24 hr tablet Take one tablet daily with bupropion 300 mg 90 tablet 0   • [DISCONTINUED]  buPROPion XL (Wellbutrin XL) 300 MG 24 hr tablet Take one tablet daily with bupropion 150 mg 90 tablet 0   • [DISCONTINUED] citalopram (CeleXA) 10 MG tablet TAKE 1 TABLET BY MOUTH TWICE A DAY 60 tablet 5   • [DISCONTINUED] docusate sodium (STOOL SOFTENER) 100 MG capsule Take 1 capsule by mouth 3 (three) times a day.     • [DISCONTINUED] fluticasone (FLONASE) 50 MCG/ACT nasal spray 2 sprays into the nostril(s) as directed by provider Daily. 1 bottle 11   • [DISCONTINUED] nystatin (MYCOSTATIN) 205281 UNIT/GM powder APPLY TO VULVA OVER CLOBETASOL CREAM     • [DISCONTINUED] pantoprazole (PROTONIX) 40 MG EC tablet TAKE 1 TABLET BY MOUTH EVERY DAY  90 tablet 0   • [DISCONTINUED] SYMBICORT 80-4.5 MCG/ACT inhaler Inhale 2 puffs 2 (Two) Times a Day. 1 inhaler 11     No current facility-administered medications on file prior to visit.        Review of Systems   Constitutional: Negative.        Recent Results (from the past 4704 hour(s))   Comprehensive Metabolic Panel    Collection Time: 12/10/20  9:35 AM    Specimen: Blood   Result Value Ref Range    Glucose 85 65 - 99 mg/dL    BUN 12 8 - 23 mg/dL    Creatinine 0.92 0.57 - 1.00 mg/dL    eGFR Non African Am 60 (L) >60 mL/min/1.73    eGFR African Am 72 >60 mL/min/1.73    BUN/Creatinine Ratio 13.0 7.0 - 25.0    Sodium 137 136 - 145 mmol/L    Potassium 4.6 3.5 - 5.2 mmol/L    Chloride 98 98 - 107 mmol/L    Total CO2 29.8 (H) 22.0 - 29.0 mmol/L    Calcium 9.8 8.6 - 10.5 mg/dL    Total Protein 7.6 6.0 - 8.5 g/dL    Albumin 4.30 3.50 - 5.20 g/dL    Globulin 3.3 gm/dL    A/G Ratio 1.3 g/dL    Total Bilirubin 0.3 0.0 - 1.2 mg/dL    Alkaline Phosphatase 73 39 - 117 U/L    AST (SGOT) 21 1 - 32 U/L    ALT (SGPT) 20 1 - 33 U/L   Lipid Panel    Collection Time: 12/10/20  9:35 AM    Specimen: Blood   Result Value Ref Range    Total Cholesterol 162 0 - 200 mg/dL    Triglycerides 202 (H) 0 - 150 mg/dL    HDL Cholesterol 49 40 - 60 mg/dL    VLDL Cholesterol Merlin 34 5 - 40 mg/dL    LDL Chol  "Calc ([facility]) 79 0 - 100 mg/dL   CBC & Differential    Collection Time: 12/10/20  9:35 AM    Specimen: Blood   Result Value Ref Range    WBC 4.17 3.40 - 10.80 10*3/mm3    RBC 4.00 3.77 - 5.28 10*6/mm3    Hemoglobin 13.0 12.0 - 15.9 g/dL    Hematocrit 39.4 34.0 - 46.6 %    MCV 98.5 (H) 79.0 - 97.0 fL    MCH 32.5 26.6 - 33.0 pg    MCHC 33.0 31.5 - 35.7 g/dL    RDW 12.7 12.3 - 15.4 %    Platelets 315 140 - 450 10*3/mm3    Neutrophil Rel % 51.6 42.7 - 76.0 %    Lymphocyte Rel % 35.0 19.6 - 45.3 %    Monocyte Rel % 10.8 5.0 - 12.0 %    Eosinophil Rel % 1.7 0.3 - 6.2 %    Basophil Rel % 0.7 0.0 - 1.5 %    Neutrophils Absolute 2.15 1.70 - 7.00 10*3/mm3    Lymphocytes Absolute 1.46 0.70 - 3.10 10*3/mm3    Monocytes Absolute 0.45 0.10 - 0.90 10*3/mm3    Eosinophils Absolute 0.07 0.00 - 0.40 10*3/mm3    Basophils Absolute 0.03 0.00 - 0.20 10*3/mm3    Immature Granulocyte Rel % 0.2 0.0 - 0.5 %    Immature Grans Absolute 0.01 0.00 - 0.05 10*3/mm3    nRBC 0.0 0.0 - 0.2 /100 WBC     Objective   Vitals:    02/09/21 1406   BP: 136/81   Pulse: 97   Temp: 97.1 °F (36.2 °C)   SpO2: 99%   Weight: 95.3 kg (210 lb)   Height: 154.9 cm (61\")     Body mass index is 39.68 kg/m².  Physical Exam  Vitals signs and nursing note reviewed.   Constitutional:       General: She is not in acute distress.     Appearance: She is well-developed. She is not diaphoretic.   Cardiovascular:      Rate and Rhythm: Normal rate and regular rhythm.   Pulmonary:      Effort: Pulmonary effort is normal. No respiratory distress.      Breath sounds: Normal breath sounds. No wheezing.           Diagnoses and all orders for this visit:    1. Essential hypertension (Primary)  Comments:  controlled  continue current meds    2. Depression, unspecified depression type  Comments:  stable on meds  Orders:  -     buPROPion XL (FORFIVO XL) 450 MG 24 hr tablet; Take 1 tablet by mouth Daily.  Dispense: 90 tablet; Refill: 3    3. Hyperlipidemia, unspecified hyperlipidemia " type  Comments:  stable on meds  Orders:  -     atorvastatin (LIPITOR) 40 MG tablet; Take 1 tablet by mouth Daily. 200001  Dispense: 90 tablet; Refill: 3    4. Gastroesophageal reflux disease without esophagitis  Comments:  stable on meds  Orders:  -     pantoprazole (PROTONIX) 40 MG EC tablet; Take 1 tablet by mouth Daily.  Dispense: 90 tablet; Refill: 3      Return in about 3 months (around 5/9/2021) for HYPERTENSION, HYPERLIPIDEMIA.

## 2021-02-11 DIAGNOSIS — F32.A DEPRESSION, UNSPECIFIED DEPRESSION TYPE: Primary | ICD-10-CM

## 2021-02-11 RX ORDER — BUPROPION HYDROCHLORIDE 150 MG/1
150 TABLET ORAL DAILY
Qty: 90 TABLET | Refills: 3 | Status: SHIPPED | OUTPATIENT
Start: 2021-02-11 | End: 2022-03-09

## 2021-02-11 RX ORDER — BUPROPION HYDROCHLORIDE 300 MG/1
300 TABLET ORAL DAILY
Qty: 90 TABLET | Refills: 3 | Status: SHIPPED | OUTPATIENT
Start: 2021-02-11 | End: 2022-03-09

## 2021-02-11 NOTE — TELEPHONE ENCOUNTER
Pharmacy sent a fax stating that patient would like to take combination of 300 mg &  150 mg due to the 1 450 mg tablet is too expensive.

## 2021-03-02 DIAGNOSIS — Z23 IMMUNIZATION DUE: ICD-10-CM

## 2021-06-01 ENCOUNTER — OFFICE VISIT (OUTPATIENT)
Dept: FAMILY MEDICINE CLINIC | Facility: CLINIC | Age: 76
End: 2021-06-01

## 2021-06-01 VITALS
DIASTOLIC BLOOD PRESSURE: 52 MMHG | HEART RATE: 101 BPM | TEMPERATURE: 97.2 F | WEIGHT: 222.25 LBS | OXYGEN SATURATION: 98 % | SYSTOLIC BLOOD PRESSURE: 132 MMHG | HEIGHT: 61 IN | BODY MASS INDEX: 41.96 KG/M2

## 2021-06-01 DIAGNOSIS — F32.A DEPRESSION, UNSPECIFIED DEPRESSION TYPE: ICD-10-CM

## 2021-06-01 PROCEDURE — 99213 OFFICE O/P EST LOW 20 MIN: CPT | Performed by: FAMILY MEDICINE

## 2021-06-01 RX ORDER — CITALOPRAM 40 MG/1
40 TABLET ORAL DAILY
Qty: 90 TABLET | Refills: 3 | Status: SHIPPED | OUTPATIENT
Start: 2021-06-01 | End: 2022-07-18

## 2021-06-01 NOTE — PROGRESS NOTES
Subjective   Bella Dukes is a 76 y.o. female.     Chief Complaint   Patient presents with   • Follow-up   • Hypertension       History of Present Illness   HTN: stable on meds  Depression: uncontrolled    The following portions of the patient's history were reviewed and updated as appropriate: allergies, current medications, past family history, past medical history, past social history, past surgical history and problem list.    Past Medical History:   Diagnosis Date   • Allergic rhinitis    • Allergy    • Angina pectoris (CMS/HCC)    • Anxiety    • Arthritis    • Asthma    • Carpal tunnel syndrome    • Chafing    • Chronic lower back pain    • Constipation    • Depression    • Former smoker    • GERD (gastroesophageal reflux disease)    • Hemorrhoids    • High blood pressure    • High cholesterol    • Hoarseness    • Hypertension    • Joint pain     both hips   • Knee pain     soft tissue   • Migraine headache    • Migraines    • Morbid obesity (CMS/HCC)    • Neck pain    • Obesity    • Other urinary problems     temporary urinary loss of control with cough and/or sneeze   • Palpitations    • Plantar fasciitis    • Pneumonia    • Seasonal allergies    • Sleep apnea        Past Surgical History:   Procedure Laterality Date   • BARIATRIC SURGERY     • COLONOSCOPY  11/18/2013   • HYSTERECTOMY      Not due to cancer   • KNEE SURGERY Right    • LAPAROSCOPIC GASTRIC BANDING     • REPLACEMENT TOTAL KNEE     • TONSILLECTOMY         Family History   Problem Relation Age of Onset   • Stroke Mother         ischemic   • Arthritis Mother    • Stroke Father         ischemic   • Arthritis Father    • Liver cancer Father    • Stroke Paternal Grandfather         ischemic   • Arthritis Paternal Grandfather    • Asthma Brother    • Liver cancer Brother    • COPD Brother    • Arthritis Maternal Grandfather    • Arthritis Paternal Grandmother    • Arthritis Maternal Grandmother        Social History     Socioeconomic History   •  Marital status: Unknown     Spouse name: Not on file   • Number of children: Not on file   • Years of education: Not on file   • Highest education level: Not on file   Tobacco Use   • Smoking status: Former Smoker   • Smokeless tobacco: Never Used   • Tobacco comment: smoked less than 1 pack per day for 5 years   Substance and Sexual Activity   • Alcohol use: No   • Drug use: No   • Sexual activity: Defer       Current Outpatient Medications on File Prior to Visit   Medication Sig Dispense Refill   • amitriptyline (ELAVIL) 10 MG tablet Take 1 tablet by mouth every night at bedtime. 90 tablet 3   • aspirin 81 MG tablet Take by mouth daily.     • atorvastatin (LIPITOR) 40 MG tablet Take 1 tablet by mouth Daily. 200001 90 tablet 3   • buPROPion XL (WELLBUTRIN XL) 150 MG 24 hr tablet Take 1 tablet by mouth Daily. 90 tablet 3   • buPROPion XL (WELLBUTRIN XL) 300 MG 24 hr tablet Take 1 tablet by mouth Daily. 90 tablet 3   • cetirizine (ZyrTEC) 10 MG tablet Take 1 tablet by mouth daily.     • Cholecalciferol (VITAMIN D-3 PO) Take 1 capsule by mouth daily.     • clobetasol (TEMOVATE) 0.05 % ointment Apply  topically to the appropriate area as directed 2 (Two) Times a Day.     • Cyanocobalamin (VITAMIN B12 PO) Take 1 tablet by mouth daily.     • Folic Acid-Vit B6-Vit B12 (FA-VITAMIN B-6-VITAMIN B-12) 2.2-25-0.5 MG tablet Take  by mouth.     • losartan (COZAAR) 100 MG tablet Take 1 tablet by mouth Daily. 200001 90 tablet 3   • meloxicam (MOBIC) 15 MG tablet Take 1 tablet by mouth Daily. 200001 90 tablet 3   • montelukast (SINGULAIR) 10 MG tablet Take 1 tablet by mouth Daily. 90 tablet 3   • MULTIPLE VITAMINS-MINERALS-FA PO Take  by mouth.     • multivitamin (THERAGRAN) tablet tablet Take 1 tablet by mouth daily.     • pantoprazole (PROTONIX) 40 MG EC tablet Take 1 tablet by mouth Daily. 90 tablet 3   • Pyridoxine HCl (VITAMIN B-6 PO) Take by mouth.     • [DISCONTINUED] citalopram (CeleXA) 20 MG tablet Take 1 tablet by mouth  Daily. 200001 90 tablet 3   • [DISCONTINUED] buPROPion XL (FORFIVO XL) 450 MG 24 hr tablet Take 1 tablet by mouth Daily. 90 tablet 3     No current facility-administered medications on file prior to visit.       Review of Systems   Psychiatric/Behavioral: Positive for depressed mood.       Recent Results (from the past 4704 hour(s))   Comprehensive Metabolic Panel    Collection Time: 12/10/20  9:35 AM    Specimen: Blood   Result Value Ref Range    Glucose 85 65 - 99 mg/dL    BUN 12 8 - 23 mg/dL    Creatinine 0.92 0.57 - 1.00 mg/dL    eGFR Non African Am 60 (L) >60 mL/min/1.73    eGFR African Am 72 >60 mL/min/1.73    BUN/Creatinine Ratio 13.0 7.0 - 25.0    Sodium 137 136 - 145 mmol/L    Potassium 4.6 3.5 - 5.2 mmol/L    Chloride 98 98 - 107 mmol/L    Total CO2 29.8 (H) 22.0 - 29.0 mmol/L    Calcium 9.8 8.6 - 10.5 mg/dL    Total Protein 7.6 6.0 - 8.5 g/dL    Albumin 4.30 3.50 - 5.20 g/dL    Globulin 3.3 gm/dL    A/G Ratio 1.3 g/dL    Total Bilirubin 0.3 0.0 - 1.2 mg/dL    Alkaline Phosphatase 73 39 - 117 U/L    AST (SGOT) 21 1 - 32 U/L    ALT (SGPT) 20 1 - 33 U/L   Lipid Panel    Collection Time: 12/10/20  9:35 AM    Specimen: Blood   Result Value Ref Range    Total Cholesterol 162 0 - 200 mg/dL    Triglycerides 202 (H) 0 - 150 mg/dL    HDL Cholesterol 49 40 - 60 mg/dL    VLDL Cholesterol Merlin 34 5 - 40 mg/dL    LDL Chol Calc (NIH) 79 0 - 100 mg/dL   CBC & Differential    Collection Time: 12/10/20  9:35 AM    Specimen: Blood   Result Value Ref Range    WBC 4.17 3.40 - 10.80 10*3/mm3    RBC 4.00 3.77 - 5.28 10*6/mm3    Hemoglobin 13.0 12.0 - 15.9 g/dL    Hematocrit 39.4 34.0 - 46.6 %    MCV 98.5 (H) 79.0 - 97.0 fL    MCH 32.5 26.6 - 33.0 pg    MCHC 33.0 31.5 - 35.7 g/dL    RDW 12.7 12.3 - 15.4 %    Platelets 315 140 - 450 10*3/mm3    Neutrophil Rel % 51.6 42.7 - 76.0 %    Lymphocyte Rel % 35.0 19.6 - 45.3 %    Monocyte Rel % 10.8 5.0 - 12.0 %    Eosinophil Rel % 1.7 0.3 - 6.2 %    Basophil Rel % 0.7 0.0 - 1.5 %     "Neutrophils Absolute 2.15 1.70 - 7.00 10*3/mm3    Lymphocytes Absolute 1.46 0.70 - 3.10 10*3/mm3    Monocytes Absolute 0.45 0.10 - 0.90 10*3/mm3    Eosinophils Absolute 0.07 0.00 - 0.40 10*3/mm3    Basophils Absolute 0.03 0.00 - 0.20 10*3/mm3    Immature Granulocyte Rel % 0.2 0.0 - 0.5 %    Immature Grans Absolute 0.01 0.00 - 0.05 10*3/mm3    nRBC 0.0 0.0 - 0.2 /100 WBC     Objective   Vitals:    06/01/21 1350   BP: 132/52   Pulse: 101   Temp: 97.2 °F (36.2 °C)   SpO2: 98%   Weight: 101 kg (222 lb 4 oz)   Height: 154.9 cm (60.98\")     Body mass index is 42.02 kg/m².  Physical Exam  Vitals and nursing note reviewed.   Constitutional:       General: She is not in acute distress.     Appearance: She is well-developed. She is not diaphoretic.   Cardiovascular:      Rate and Rhythm: Normal rate and regular rhythm.   Pulmonary:      Effort: Pulmonary effort is normal. No respiratory distress.      Breath sounds: Normal breath sounds. No wheezing.           Diagnoses and all orders for this visit:    1. Depression, unspecified depression type  -     citalopram (CeleXA) 40 MG tablet; Take 1 tablet by mouth Daily. 200001  Dispense: 90 tablet; Refill: 3        Return in about 3 months (around 9/1/2021) for HYPERTENSION, depression.  Return in about 3 months (around 9/1/2021) for HYPERTENSION, depression.  Return in about 6 months (around 12/13/2021) for Medicare Wellness.      "

## 2021-09-14 ENCOUNTER — OFFICE VISIT (OUTPATIENT)
Dept: FAMILY MEDICINE CLINIC | Facility: CLINIC | Age: 76
End: 2021-09-14

## 2021-09-14 VITALS
BODY MASS INDEX: 41.61 KG/M2 | HEIGHT: 61 IN | DIASTOLIC BLOOD PRESSURE: 70 MMHG | SYSTOLIC BLOOD PRESSURE: 135 MMHG | TEMPERATURE: 96.9 F | OXYGEN SATURATION: 93 % | WEIGHT: 220.4 LBS | HEART RATE: 85 BPM

## 2021-09-14 DIAGNOSIS — E78.5 HYPERLIPIDEMIA, UNSPECIFIED HYPERLIPIDEMIA TYPE: ICD-10-CM

## 2021-09-14 DIAGNOSIS — I10 ESSENTIAL HYPERTENSION: ICD-10-CM

## 2021-09-14 DIAGNOSIS — F32.1 CURRENT MODERATE EPISODE OF MAJOR DEPRESSIVE DISORDER WITHOUT PRIOR EPISODE (HCC): Primary | ICD-10-CM

## 2021-09-14 DIAGNOSIS — B35.1 NAIL FUNGUS: ICD-10-CM

## 2021-09-14 PROCEDURE — 99214 OFFICE O/P EST MOD 30 MIN: CPT | Performed by: FAMILY MEDICINE

## 2021-09-14 RX ORDER — ATORVASTATIN CALCIUM 40 MG/1
40 TABLET, FILM COATED ORAL DAILY
Qty: 90 TABLET | Refills: 3 | Status: CANCELLED | OUTPATIENT
Start: 2021-09-14

## 2021-09-14 RX ORDER — TERBINAFINE HYDROCHLORIDE 250 MG/1
250 TABLET ORAL DAILY
Qty: 30 TABLET | Refills: 3 | Status: SHIPPED | OUTPATIENT
Start: 2021-09-14 | End: 2022-02-01

## 2021-09-14 NOTE — PROGRESS NOTES
Subjective   Bella Dukes is a 76 y.o. female.     Chief Complaint   Patient presents with   • Depression   • other     toe fungus, has been to a podiatrist        History of Present Illness   Follow-up for depression.  Patient admits that she is feels somewhat better now on Wellbutrin 450 mg a day.  She is also following up on podiatrist appointment for her fungal toenails she was recommended a prescription of Lamisil 250 mg 1 day p.o.  But she needs her liver test functions checked today.      The following portions of the patient's history were reviewed and updated as appropriate: allergies, current medications, past family history, past medical history, past social history, past surgical history and problem list.    Past Medical History:   Diagnosis Date   • Allergic rhinitis    • Allergy    • Angina pectoris (CMS/HCC)    • Anxiety    • Arthritis    • Asthma    • Carpal tunnel syndrome    • Chafing    • Chronic lower back pain    • Constipation    • Depression    • Former smoker    • GERD (gastroesophageal reflux disease)    • Hemorrhoids    • High blood pressure    • High cholesterol    • Hoarseness    • Hypertension    • Joint pain     both hips   • Knee pain     soft tissue   • Migraine headache    • Migraines    • Morbid obesity (CMS/HCC)    • Neck pain    • Obesity    • Other urinary problems     temporary urinary loss of control with cough and/or sneeze   • Palpitations    • Plantar fasciitis    • Pneumonia    • Seasonal allergies    • Sleep apnea        Past Surgical History:   Procedure Laterality Date   • BARIATRIC SURGERY     • COLONOSCOPY  11/18/2013   • HYSTERECTOMY      Not due to cancer   • KNEE SURGERY Right    • LAPAROSCOPIC GASTRIC BANDING     • REPLACEMENT TOTAL KNEE     • TONSILLECTOMY         Family History   Problem Relation Age of Onset   • Stroke Mother         ischemic   • Arthritis Mother    • Stroke Father         ischemic   • Arthritis Father    • Liver cancer Father    • Stroke  Paternal Grandfather         ischemic   • Arthritis Paternal Grandfather    • Asthma Brother    • Liver cancer Brother    • COPD Brother    • Arthritis Maternal Grandfather    • Arthritis Paternal Grandmother    • Arthritis Maternal Grandmother        Social History     Socioeconomic History   • Marital status: Unknown     Spouse name: Not on file   • Number of children: Not on file   • Years of education: Not on file   • Highest education level: Not on file   Tobacco Use   • Smoking status: Former Smoker   • Smokeless tobacco: Never Used   • Tobacco comment: smoked less than 1 pack per day for 5 years   Substance and Sexual Activity   • Alcohol use: No   • Drug use: No   • Sexual activity: Defer       Current Outpatient Medications on File Prior to Visit   Medication Sig Dispense Refill   • amitriptyline (ELAVIL) 10 MG tablet Take 1 tablet by mouth every night at bedtime. 90 tablet 3   • aspirin 81 MG tablet Take by mouth daily.     • atorvastatin (LIPITOR) 40 MG tablet Take 1 tablet by mouth Daily. 200001 90 tablet 3   • buPROPion XL (WELLBUTRIN XL) 150 MG 24 hr tablet Take 1 tablet by mouth Daily. 90 tablet 3   • buPROPion XL (WELLBUTRIN XL) 300 MG 24 hr tablet Take 1 tablet by mouth Daily. 90 tablet 3   • cetirizine (ZyrTEC) 10 MG tablet Take 1 tablet by mouth daily.     • Cholecalciferol (VITAMIN D-3 PO) Take 1 capsule by mouth daily.     • citalopram (CeleXA) 40 MG tablet Take 1 tablet by mouth Daily. 200001 90 tablet 3   • clobetasol (TEMOVATE) 0.05 % ointment Apply  topically to the appropriate area as directed 2 (Two) Times a Day.     • Cyanocobalamin (VITAMIN B12 PO) Take 1 tablet by mouth daily.     • Folic Acid-Vit B6-Vit B12 (FA-VITAMIN B-6-VITAMIN B-12) 2.2-25-0.5 MG tablet Take  by mouth.     • losartan (COZAAR) 100 MG tablet Take 1 tablet by mouth Daily. 200001 90 tablet 3   • meloxicam (MOBIC) 15 MG tablet Take 1 tablet by mouth Daily. 200001 90 tablet 3   • montelukast (SINGULAIR) 10 MG tablet Take  "1 tablet by mouth Daily. 90 tablet 3   • MULTIPLE VITAMINS-MINERALS-FA PO Take  by mouth.     • multivitamin (THERAGRAN) tablet tablet Take 1 tablet by mouth daily.     • pantoprazole (PROTONIX) 40 MG EC tablet Take 1 tablet by mouth Daily. 90 tablet 3   • Pyridoxine HCl (VITAMIN B-6 PO) Take by mouth.       No current facility-administered medications on file prior to visit.       Review of Systems   Constitutional: Negative.        No results found for this or any previous visit (from the past 4704 hour(s)).  Objective   Vitals:    09/14/21 1359   BP: 135/70   BP Location: Left arm   Patient Position: Sitting   Pulse: 85   Temp: 96.9 °F (36.1 °C)   SpO2: 93%   Weight: 100 kg (220 lb 6.4 oz)   Height: 154.9 cm (60.98\")     Body mass index is 41.67 kg/m².  Physical Exam  Vitals and nursing note reviewed.   Constitutional:       General: She is not in acute distress.     Appearance: She is well-developed. She is not diaphoretic.   Cardiovascular:      Rate and Rhythm: Normal rate and regular rhythm.   Pulmonary:      Effort: Pulmonary effort is normal. No respiratory distress.      Breath sounds: Normal breath sounds. No wheezing.   Skin:     Comments: Bilateral feet toenail fungus           Diagnoses and all orders for this visit:    1. Current moderate episode of major depressive disorder without prior episode (CMS/HCC) (Primary)  Comments:  Improved on Wellbutrin 450 mg a day    2. Hyperlipidemia, unspecified hyperlipidemia type  Comments:  stable on meds  Orders:  -     Comprehensive Metabolic Panel  -     Lipid Panel  -     CBC & Differential    3. Nail fungus  Comments:  Diagnosis.  We will start the Lamisil.  Check her liver function test today  Orders:  -     terbinafine (lamiSIL) 250 MG tablet; Take 1 tablet by mouth Daily.  Dispense: 30 tablet; Refill: 3    4. Essential hypertension  -     Comprehensive Metabolic Panel  -     Lipid Panel  -     CBC & Differential    Other orders  -     Cancel: atorvastatin " (LIPITOR) 40 MG tablet; Take 1 tablet by mouth Daily. 200001  Dispense: 90 tablet; Refill: 3      Return in about 3 months (around 12/13/2021) for Medicare Wellness.

## 2021-09-15 DIAGNOSIS — R71.8 ELEVATED MCV: Primary | ICD-10-CM

## 2021-09-15 LAB
ALBUMIN SERPL-MCNC: 4.6 G/DL (ref 3.5–5.2)
ALBUMIN/GLOB SERPL: 1.5 G/DL
ALP SERPL-CCNC: 70 U/L (ref 39–117)
ALT SERPL-CCNC: 17 U/L (ref 1–33)
AST SERPL-CCNC: 24 U/L (ref 1–32)
BASOPHILS # BLD AUTO: 0.04 10*3/MM3 (ref 0–0.2)
BASOPHILS NFR BLD AUTO: 0.8 % (ref 0–1.5)
BILIRUB SERPL-MCNC: 0.3 MG/DL (ref 0–1.2)
BUN SERPL-MCNC: 10 MG/DL (ref 8–23)
BUN/CREAT SERPL: 12.2 (ref 7–25)
CALCIUM SERPL-MCNC: 10.5 MG/DL (ref 8.6–10.5)
CHLORIDE SERPL-SCNC: 104 MMOL/L (ref 98–107)
CHOLEST SERPL-MCNC: 176 MG/DL (ref 0–200)
CO2 SERPL-SCNC: 27.9 MMOL/L (ref 22–29)
CREAT SERPL-MCNC: 0.82 MG/DL (ref 0.57–1)
EOSINOPHIL # BLD AUTO: 0.09 10*3/MM3 (ref 0–0.4)
EOSINOPHIL NFR BLD AUTO: 1.8 % (ref 0.3–6.2)
ERYTHROCYTE [DISTWIDTH] IN BLOOD BY AUTOMATED COUNT: 13.1 % (ref 12.3–15.4)
GLOBULIN SER CALC-MCNC: 3.1 GM/DL
GLUCOSE SERPL-MCNC: 91 MG/DL (ref 65–99)
HCT VFR BLD AUTO: 39 % (ref 34–46.6)
HDLC SERPL-MCNC: 55 MG/DL (ref 40–60)
HGB BLD-MCNC: 12.9 G/DL (ref 12–15.9)
IMM GRANULOCYTES # BLD AUTO: 0.01 10*3/MM3 (ref 0–0.05)
IMM GRANULOCYTES NFR BLD AUTO: 0.2 % (ref 0–0.5)
LDLC SERPL CALC-MCNC: 84 MG/DL (ref 0–100)
LYMPHOCYTES # BLD AUTO: 1.4 10*3/MM3 (ref 0.7–3.1)
LYMPHOCYTES NFR BLD AUTO: 27.6 % (ref 19.6–45.3)
MCH RBC QN AUTO: 33.4 PG (ref 26.6–33)
MCHC RBC AUTO-ENTMCNC: 33.1 G/DL (ref 31.5–35.7)
MCV RBC AUTO: 101 FL (ref 79–97)
MONOCYTES # BLD AUTO: 0.47 10*3/MM3 (ref 0.1–0.9)
MONOCYTES NFR BLD AUTO: 9.3 % (ref 5–12)
NEUTROPHILS # BLD AUTO: 3.06 10*3/MM3 (ref 1.7–7)
NEUTROPHILS NFR BLD AUTO: 60.3 % (ref 42.7–76)
NRBC BLD AUTO-RTO: 0 /100 WBC (ref 0–0.2)
PLATELET # BLD AUTO: 310 10*3/MM3 (ref 140–450)
POTASSIUM SERPL-SCNC: 4.8 MMOL/L (ref 3.5–5.2)
PROT SERPL-MCNC: 7.7 G/DL (ref 6–8.5)
RBC # BLD AUTO: 3.86 10*6/MM3 (ref 3.77–5.28)
SODIUM SERPL-SCNC: 144 MMOL/L (ref 136–145)
TRIGL SERPL-MCNC: 224 MG/DL (ref 0–150)
VLDLC SERPL CALC-MCNC: 37 MG/DL (ref 5–40)
WBC # BLD AUTO: 5.07 10*3/MM3 (ref 3.4–10.8)

## 2021-09-21 ENCOUNTER — TELEPHONE (OUTPATIENT)
Dept: FAMILY MEDICINE CLINIC | Facility: CLINIC | Age: 76
End: 2021-09-21

## 2021-09-21 NOTE — TELEPHONE ENCOUNTER
Caller: Bella Dukes    Relationship: Self    Best call back number: [phone] (M)    What test was performed: LIVER PANEL    When was the test performed: 09/14/21    Where was the test performed: [facility]    Additional notes: PATIENT STATES NEEDS RESULTS TO GET THE OK TO HAVE SURGERY

## 2021-10-22 ENCOUNTER — APPOINTMENT (OUTPATIENT)
Dept: WOMENS IMAGING | Facility: HOSPITAL | Age: 76
End: 2021-10-22

## 2021-10-22 PROCEDURE — 77066 DX MAMMO INCL CAD BI: CPT | Performed by: RADIOLOGY

## 2021-10-22 PROCEDURE — 76641 ULTRASOUND BREAST COMPLETE: CPT | Performed by: RADIOLOGY

## 2021-10-22 PROCEDURE — G0279 TOMOSYNTHESIS, MAMMO: HCPCS | Performed by: RADIOLOGY

## 2021-11-11 ENCOUNTER — APPOINTMENT (OUTPATIENT)
Dept: WOMENS IMAGING | Facility: HOSPITAL | Age: 76
End: 2021-11-11

## 2021-11-11 PROCEDURE — 76942 ECHO GUIDE FOR BIOPSY: CPT | Performed by: RADIOLOGY

## 2021-11-11 PROCEDURE — 19000 PUNCTURE ASPIR CYST BREAST: CPT | Performed by: RADIOLOGY

## 2021-11-26 DIAGNOSIS — I10 ESSENTIAL HYPERTENSION: ICD-10-CM

## 2021-11-26 RX ORDER — LOSARTAN POTASSIUM 100 MG/1
TABLET ORAL
Qty: 90 TABLET | Refills: 0 | Status: SHIPPED | OUTPATIENT
Start: 2021-11-26 | End: 2021-12-13

## 2021-11-29 ENCOUNTER — TELEMEDICINE (OUTPATIENT)
Dept: FAMILY MEDICINE CLINIC | Facility: CLINIC | Age: 76
End: 2021-11-29

## 2021-11-29 DIAGNOSIS — R05.9 COUGH: Primary | ICD-10-CM

## 2021-11-29 PROCEDURE — 99213 OFFICE O/P EST LOW 20 MIN: CPT | Performed by: FAMILY MEDICINE

## 2021-11-29 NOTE — PROGRESS NOTES
Subjective   Bella Dukes is a 76 y.o. female.   Consent given    Time 20 min    [facility]    CC: sick for 3 day  Nasal congestion, sinus congestion, body aches, diarrhea, cough, headaches, no fever    History of Present Illness     sick for 3 day  Nasal congestion, sinus congestion, body aches, diarrhea, cough, headaches, no fever  But 99.1  Fully immunized  No known Covid contact.  There only 3 people for Thanksgiving    The following portions of the patient's history were reviewed and updated as appropriate: allergies, current medications, past family history, past medical history, past social history, past surgical history and problem list.    Past Medical History:   Diagnosis Date   • Allergic rhinitis    • Allergy    • Angina pectoris (CMS/HCC)    • Anxiety    • Arthritis    • Asthma    • Carpal tunnel syndrome    • Chafing    • Chronic lower back pain    • Constipation    • Depression    • Former smoker    • GERD (gastroesophageal reflux disease)    • Hemorrhoids    • High blood pressure    • High cholesterol    • Hoarseness    • Hypertension    • Joint pain     both hips   • Knee pain     soft tissue   • Migraine headache    • Migraines    • Morbid obesity (CMS/HCC)    • Neck pain    • Obesity    • Other urinary problems     temporary urinary loss of control with cough and/or sneeze   • Palpitations    • Plantar fasciitis    • Pneumonia    • Seasonal allergies    • Sleep apnea        Past Surgical History:   Procedure Laterality Date   • BARIATRIC SURGERY     • COLONOSCOPY  11/18/2013   • HYSTERECTOMY      Not due to cancer   • KNEE SURGERY Right    • LAPAROSCOPIC GASTRIC BANDING     • REPLACEMENT TOTAL KNEE     • TONSILLECTOMY         Family History   Problem Relation Age of Onset   • Stroke Mother         ischemic   • Arthritis Mother    • Stroke Father         ischemic   • Arthritis Father    • Liver cancer Father    • Stroke Paternal Grandfather         ischemic   • Arthritis Paternal  Grandfather    • Asthma Brother    • Liver cancer Brother    • COPD Brother    • Arthritis Maternal Grandfather    • Arthritis Paternal Grandmother    • Arthritis Maternal Grandmother        Social History     Socioeconomic History   • Marital status: Unknown   Tobacco Use   • Smoking status: Former Smoker   • Smokeless tobacco: Never Used   • Tobacco comment: smoked less than 1 pack per day for 5 years   Substance and Sexual Activity   • Alcohol use: No   • Drug use: No   • Sexual activity: Defer       Current Outpatient Medications on File Prior to Visit   Medication Sig Dispense Refill   • amitriptyline (ELAVIL) 10 MG tablet Take 1 tablet by mouth every night at bedtime. 90 tablet 3   • aspirin 81 MG tablet Take by mouth daily.     • atorvastatin (LIPITOR) 40 MG tablet Take 1 tablet by mouth Daily. 200001 90 tablet 3   • buPROPion XL (WELLBUTRIN XL) 150 MG 24 hr tablet Take 1 tablet by mouth Daily. 90 tablet 3   • buPROPion XL (WELLBUTRIN XL) 300 MG 24 hr tablet Take 1 tablet by mouth Daily. 90 tablet 3   • cetirizine (ZyrTEC) 10 MG tablet Take 1 tablet by mouth daily.     • Cholecalciferol (VITAMIN D-3 PO) Take 1 capsule by mouth daily.     • citalopram (CeleXA) 40 MG tablet Take 1 tablet by mouth Daily. 200001 90 tablet 3   • clobetasol (TEMOVATE) 0.05 % ointment Apply  topically to the appropriate area as directed 2 (Two) Times a Day.     • Cyanocobalamin (VITAMIN B12 PO) Take 1 tablet by mouth daily.     • Folic Acid-Vit B6-Vit B12 (FA-VITAMIN B-6-VITAMIN B-12) 2.2-25-0.5 MG tablet Take  by mouth.     • losartan (COZAAR) 100 MG tablet TAKE 1 TABLET BY MOUTH EVERY DAY 90 tablet 0   • meloxicam (MOBIC) 15 MG tablet Take 1 tablet by mouth Daily. 200001 90 tablet 3   • montelukast (SINGULAIR) 10 MG tablet Take 1 tablet by mouth Daily. 90 tablet 3   • MULTIPLE VITAMINS-MINERALS-FA PO Take  by mouth.     • multivitamin (THERAGRAN) tablet tablet Take 1 tablet by mouth daily.     • pantoprazole (PROTONIX) 40 MG EC  tablet Take 1 tablet by mouth Daily. 90 tablet 3   • Pyridoxine HCl (VITAMIN B-6 PO) Take by mouth.     • terbinafine (lamiSIL) 250 MG tablet Take 1 tablet by mouth Daily. 30 tablet 3     No current facility-administered medications on file prior to visit.       Review of Systems   HENT: Positive for congestion and sinus pressure.        Recent Results (from the past 4704 hour(s))   Comprehensive Metabolic Panel    Collection Time: 09/14/21  2:44 PM    Specimen: Blood   Result Value Ref Range    Glucose 91 65 - 99 mg/dL    BUN 10 8 - 23 mg/dL    Creatinine 0.82 0.57 - 1.00 mg/dL    eGFR Non African Am 68 >60 mL/min/1.73    eGFR African Am 82 >60 mL/min/1.73    BUN/Creatinine Ratio 12.2 7.0 - 25.0    Sodium 144 136 - 145 mmol/L    Potassium 4.8 3.5 - 5.2 mmol/L    Chloride 104 98 - 107 mmol/L    Total CO2 27.9 22.0 - 29.0 mmol/L    Calcium 10.5 8.6 - 10.5 mg/dL    Total Protein 7.7 6.0 - 8.5 g/dL    Albumin 4.60 3.50 - 5.20 g/dL    Globulin 3.1 gm/dL    A/G Ratio 1.5 g/dL    Total Bilirubin 0.3 0.0 - 1.2 mg/dL    Alkaline Phosphatase 70 39 - 117 U/L    AST (SGOT) 24 1 - 32 U/L    ALT (SGPT) 17 1 - 33 U/L   Lipid Panel    Collection Time: 09/14/21  2:44 PM    Specimen: Blood   Result Value Ref Range    Total Cholesterol 176 0 - 200 mg/dL    Triglycerides 224 (H) 0 - 150 mg/dL    HDL Cholesterol 55 40 - 60 mg/dL    VLDL Cholesterol Merlin 37 5 - 40 mg/dL    LDL Chol Calc (NIH) 84 0 - 100 mg/dL   CBC & Differential    Collection Time: 09/14/21  2:44 PM    Specimen: Blood   Result Value Ref Range    WBC 5.07 3.40 - 10.80 10*3/mm3    RBC 3.86 3.77 - 5.28 10*6/mm3    Hemoglobin 12.9 12.0 - 15.9 g/dL    Hematocrit 39.0 34.0 - 46.6 %    .0 (H) 79.0 - 97.0 fL    MCH 33.4 (H) 26.6 - 33.0 pg    MCHC 33.1 31.5 - 35.7 g/dL    RDW 13.1 12.3 - 15.4 %    Platelets 310 140 - 450 10*3/mm3    Neutrophil Rel % 60.3 42.7 - 76.0 %    Lymphocyte Rel % 27.6 19.6 - 45.3 %    Monocyte Rel % 9.3 5.0 - 12.0 %    Eosinophil Rel % 1.8 0.3 -  6.2 %    Basophil Rel % 0.8 0.0 - 1.5 %    Neutrophils Absolute 3.06 1.70 - 7.00 10*3/mm3    Lymphocytes Absolute 1.40 0.70 - 3.10 10*3/mm3    Monocytes Absolute 0.47 0.10 - 0.90 10*3/mm3    Eosinophils Absolute 0.09 0.00 - 0.40 10*3/mm3    Basophils Absolute 0.04 0.00 - 0.20 10*3/mm3    Immature Granulocyte Rel % 0.2 0.0 - 0.5 %    Immature Grans Absolute 0.01 0.00 - 0.05 10*3/mm3    nRBC 0.0 0.0 - 0.2 /100 WBC     Objective   There were no vitals filed for this visit.  There is no height or weight on file to calculate BMI.  Physical Exam  Constitutional:       Appearance: Normal appearance. She is not ill-appearing.   Neurological:      Mental Status: She is alert.           Diagnoses and all orders for this visit:    1. Cough (Primary)  -     COVID-19,[facility] ROUTINE, NP/OP SWAB IN TRANSPORT MEDIA OR ESWAB 72 HR TAT - Swab, Anterior nasal    SxTx only at this time.    Return if symptoms worsen or fail to improve.

## 2021-11-30 ENCOUNTER — TELEPHONE (OUTPATIENT)
Dept: FAMILY MEDICINE CLINIC | Facility: CLINIC | Age: 76
End: 2021-11-30

## 2021-11-30 DIAGNOSIS — J01.00 ACUTE NON-RECURRENT MAXILLARY SINUSITIS: Primary | ICD-10-CM

## 2021-11-30 LAB
LABCORP SARS-COV-2, NAA 2 DAY TAT: NORMAL
SARS-COV-2 RNA RESP QL NAA+PROBE: NOT DETECTED

## 2021-11-30 RX ORDER — LEVOFLOXACIN 500 MG/1
500 TABLET, FILM COATED ORAL DAILY
Qty: 7 TABLET | Refills: 0 | Status: SHIPPED | OUTPATIENT
Start: 2021-11-30 | End: 2021-12-02

## 2021-11-30 NOTE — TELEPHONE ENCOUNTER
Caller: Bella Dukes    Relationship to patient: Self    Best call back number: [phone]    Patient is needing: PATIENT CALLED IN AND WANTED TO KNOW IF SHE NEEDED TO BE TAKING ALL HER REGULAR MEDICATION WHILE SHE IS DEALING WITH THIS ILLNESS. PLEASE CALL PATIENT AND ADVISE

## 2021-12-01 NOTE — TELEPHONE ENCOUNTER
Patient informed and stated that the pharmacy said the celexa and levaquin should not be taken together.

## 2021-12-02 DIAGNOSIS — R05.9 COUGH: Primary | ICD-10-CM

## 2021-12-02 RX ORDER — AMOXICILLIN AND CLAVULANATE POTASSIUM 875; 125 MG/1; MG/1
1 TABLET, FILM COATED ORAL 2 TIMES DAILY
Qty: 14 TABLET | Refills: 0 | Status: SHIPPED | OUTPATIENT
Start: 2021-12-02 | End: 2021-12-16

## 2021-12-02 NOTE — TELEPHONE ENCOUNTER
Patient called back wanting to confirm if she should be taking the celexa and levaquin together.  She said the pharmacy told her they should be taken at the same time.  Her phone number is [phone].

## 2021-12-02 NOTE — TELEPHONE ENCOUNTER
I had called patient and explained her that unfortunately we cannot give her Levaquin for her symptoms with Celexa.  I instead called in Augmentin and discontinue Levaquin.

## 2021-12-07 ENCOUNTER — TELEPHONE (OUTPATIENT)
Dept: FAMILY MEDICINE CLINIC | Facility: CLINIC | Age: 76
End: 2021-12-07

## 2021-12-07 NOTE — TELEPHONE ENCOUNTER
Caller: Bella Dukes    Relationship to patient: Self    Best call back number: [phone] (M)  Patient is needing: PATIENT STATES SHE  STARTED amoxicillin-clavulanate (Augmentin) 875-125 MG per tablet ON 12/2/21, AND STARTED EXPERIENCING DIARRHEA A LITTLE BIT AFTER THAT. WOULD LIKE TO KNOW WHAT SHE SHOULD DO.  STATES THAT SHE HAS BEEN TAKING IMODIUM WITH NO RELIEF. STATES IT LOOKS FLUFFY WHEN IT THE TOILET. IS UNABLE TO CONTROL. PLEASE ADVISE. THANK YOU.

## 2021-12-08 DIAGNOSIS — R19.7 DIARRHEA, UNSPECIFIED TYPE: Primary | ICD-10-CM

## 2021-12-09 ENCOUNTER — TELEPHONE (OUTPATIENT)
Dept: FAMILY MEDICINE CLINIC | Facility: CLINIC | Age: 76
End: 2021-12-09

## 2021-12-09 NOTE — TELEPHONE ENCOUNTER
Spoke to pt and informed her that she cant have any urine in her stool sample and its ok for her to recollect the sample.

## 2021-12-09 NOTE — TELEPHONE ENCOUNTER
Caller: Bella Dukes    Relationship to patient: Self    Best call back number: [phone] (M)      Patient is needing:PATEINT STATES NO INSTRUCTIONS CAME WITH THE STUFF TO COLLECT HER STOOL SPECIMEN. STATES SHE GOOGLED IT AND IT SAYS THAT URINE WILL NOT BE ACCEPTED WITH THE SAMPLE, BUT THERE IS URINE WITH HER SAMPLE. WANTS TO KNOW IF THAT WILL BE ACCEPTED. PATIENT ALSO WANTS TO KNOW. PATIENT ALSO WANTS TO KNOW IF SHE DOES ANOTHER SAMPLE IF SHE DOES ANOTHER SAMPLE LATER ON TODAY WILL THAT BE ACCEPTED? PLEASE ADVISE. THANK YOU.

## 2021-12-11 DIAGNOSIS — I10 ESSENTIAL HYPERTENSION: ICD-10-CM

## 2021-12-13 ENCOUNTER — TELEPHONE (OUTPATIENT)
Dept: FAMILY MEDICINE CLINIC | Facility: CLINIC | Age: 76
End: 2021-12-13

## 2021-12-13 RX ORDER — LOSARTAN POTASSIUM 100 MG/1
TABLET ORAL
Qty: 90 TABLET | Refills: 0 | Status: SHIPPED | OUTPATIENT
Start: 2021-12-13 | End: 2022-06-16

## 2021-12-13 NOTE — TELEPHONE ENCOUNTER
Rx Refill Note  Requested Prescriptions     Pending Prescriptions Disp Refills   • losartan (COZAAR) 100 MG tablet [Pharmacy Med Name: Losartan Potassium Oral Tablet 100 MG] 90 tablet 0     Sig: TAKE 1 TABLET BY MOUTH EVERY DAY      Last office visit with prescribing clinician: 9/14/2021      Next office visit with prescribing clinician: 12/13/2021   Last filled 11/26/2021             Lydia Nash MA  12/13/21, 16:57 EST

## 2021-12-13 NOTE — TELEPHONE ENCOUNTER
PATIENT CALLING STATING THAT HER PODIATRIST  WILL BE FAXING A FORM TO THE OFFICE THAT  WILL NEED TO FILL OUT FOR A SPECIAL TYPE OF SHOES.    GOOD CALL BACK  [phone]

## 2021-12-16 ENCOUNTER — OFFICE VISIT (OUTPATIENT)
Dept: FAMILY MEDICINE CLINIC | Facility: CLINIC | Age: 76
End: 2021-12-16

## 2021-12-16 VITALS
BODY MASS INDEX: 40.59 KG/M2 | HEIGHT: 61 IN | SYSTOLIC BLOOD PRESSURE: 142 MMHG | WEIGHT: 215 LBS | DIASTOLIC BLOOD PRESSURE: 72 MMHG | TEMPERATURE: 96.9 F | HEART RATE: 82 BPM | OXYGEN SATURATION: 92 %

## 2021-12-16 DIAGNOSIS — Z00.00 MEDICARE ANNUAL WELLNESS VISIT, SUBSEQUENT: Primary | ICD-10-CM

## 2021-12-16 DIAGNOSIS — R19.7 DIARRHEA, UNSPECIFIED TYPE: ICD-10-CM

## 2021-12-16 PROCEDURE — 1170F FXNL STATUS ASSESSED: CPT | Performed by: FAMILY MEDICINE

## 2021-12-16 PROCEDURE — 1159F MED LIST DOCD IN RCRD: CPT | Performed by: FAMILY MEDICINE

## 2021-12-16 PROCEDURE — G0439 PPPS, SUBSEQ VISIT: HCPCS | Performed by: FAMILY MEDICINE

## 2021-12-16 NOTE — PROGRESS NOTES
The ABCs of the Annual Wellness Visit  Subsequent Medicare Wellness Visit    Chief Complaint   Patient presents with   • Medicare Wellness-subsequent      Subjective    History of Present Illness:  Bella Dukes is a 76 y.o. female who presents for a Subsequent Medicare Wellness Visit.    The following portions of the patient's history were reviewed and   updated as appropriate: allergies, current medications, past family history, past medical history, past social history, past surgical history and problem list.    Compared to one year ago, the patient feels her physical   health is worse.    Compared to one year ago, the patient feels her mental   health is the same.    Recent Hospitalizations:  She was not admitted to the hospital during the last year.       Current Medical Providers:  Patient Care Team:  Katina Montez MD as PCP - General (Family Medicine)    Outpatient Medications Prior to Visit   Medication Sig Dispense Refill   • amitriptyline (ELAVIL) 10 MG tablet Take 1 tablet by mouth every night at bedtime. 90 tablet 3   • aspirin 81 MG tablet Take by mouth daily.     • atorvastatin (LIPITOR) 40 MG tablet Take 1 tablet by mouth Daily. 200001 90 tablet 3   • buPROPion XL (WELLBUTRIN XL) 150 MG 24 hr tablet Take 1 tablet by mouth Daily. 90 tablet 3   • buPROPion XL (WELLBUTRIN XL) 300 MG 24 hr tablet Take 1 tablet by mouth Daily. 90 tablet 3   • cetirizine (ZyrTEC) 10 MG tablet Take 1 tablet by mouth daily.     • Cholecalciferol (VITAMIN D-3 PO) Take 1 capsule by mouth daily.     • citalopram (CeleXA) 40 MG tablet Take 1 tablet by mouth Daily. 200001 90 tablet 3   • clobetasol (TEMOVATE) 0.05 % ointment Apply  topically to the appropriate area as directed 2 (Two) Times a Day.     • Cyanocobalamin (VITAMIN B12 PO) Take 1 tablet by mouth daily.     • Folic Acid-Vit B6-Vit B12 (FA-VITAMIN B-6-VITAMIN B-12) 2.2-25-0.5 MG tablet Take  by mouth.     • losartan (COZAAR) 100 MG tablet TAKE 1 TABLET BY MOUTH  EVERY DAY 90 tablet 0   • meloxicam (MOBIC) 15 MG tablet Take 1 tablet by mouth Daily. 200001 90 tablet 3   • montelukast (SINGULAIR) 10 MG tablet Take 1 tablet by mouth Daily. 90 tablet 3   • MULTIPLE VITAMINS-MINERALS-FA PO Take  by mouth.     • multivitamin (THERAGRAN) tablet tablet Take 1 tablet by mouth daily.     • pantoprazole (PROTONIX) 40 MG EC tablet Take 1 tablet by mouth Daily. 90 tablet 3   • Pyridoxine HCl (VITAMIN B-6 PO) Take by mouth.     • terbinafine (lamiSIL) 250 MG tablet Take 1 tablet by mouth Daily. 30 tablet 3   • amoxicillin-clavulanate (Augmentin) 875-125 MG per tablet Take 1 tablet by mouth 2 (Two) Times a Day. 14 tablet 0     No facility-administered medications prior to visit.       No opioid medication identified on active medication list. I have reviewed chart for other potential  high risk medication/s and harmful drug interactions in the elderly.          Aspirin is on active medication list. Aspirin use is not indicated based on review of current medical condition/s. Risk of harm outweighs potential benefits. Patient instructed to discontinue this medication.  .      Patient Active Problem List   Diagnosis   • Dilatation of aorta (HCC)   • Mild intermittent asthma without complication   • Constipation   • Depression   • Gastroesophageal reflux disease without esophagitis   • Hyperlipidemia   • Essential hypertension   • Low back pain   • Pericardial effusion   • Morbid obesity (HCC)   • Thoracic back pain   • Dysphagia   • Fatigue   • Sleep apnea   • Medicare annual wellness visit, subsequent   • Acute non-recurrent maxillary sinusitis   • Arm pain, anterior, right   • Knee pain   • Choking   • Nasal congestion   • Cough   • PND (post-nasal drip)   • Asthma   • Arthritis   • Allergies   • Chronic migraine without aura without status migrainosus, not intractable   • Current moderate episode of major depressive disorder without prior episode ([facility])   • Nail fungus   • Diarrhea  "    Advance Care Planning  Advance Directive is not on file.  ACP discussion was held with the patient during this visit. Patient has an advance directive (not in EMR), copy requested.    Review of Systems   Constitutional: Negative.         Objective    Vitals:    12/16/21 1315   BP: 142/72   BP Location: Right arm   Patient Position: Sitting   Pulse: 82   Temp: 96.9 °F (36.1 °C)   SpO2: 92%   Weight: 97.5 kg (215 lb)   Height: 154.9 cm (60.98\")     BMI Readings from Last 1 Encounters:   12/16/21 40.65 kg/m²   BMI is above normal parameters. Recommendations include: exercise counseling    Does the patient have evidence of cognitive impairment? No    Physical Exam  Vitals and nursing note reviewed.   Constitutional:       Appearance: Normal appearance. She is normal weight.   Neurological:      Mental Status: She is alert.                 HEALTH RISK ASSESSMENT    Smoking Status:  Social History     Tobacco Use   Smoking Status Former Smoker   Smokeless Tobacco Never Used   Tobacco Comment    smoked less than 1 pack per day for 5 years     Alcohol Consumption:  Social History     Substance and Sexual Activity   Alcohol Use No     Fall Risk Screen:    STEADI Fall Risk Assessment was completed, and patient is at MODERATE risk for falls. Assessment completed on:12/16/2021    Depression Screening:  PHQ-2/PHQ-9 Depression Screening 12/16/2021   Little interest or pleasure in doing things 0   Feeling down, depressed, or hopeless 1   Trouble falling or staying asleep, or sleeping too much 1   Feeling tired or having little energy 1   Poor appetite or overeating 1   Feeling bad about yourself - or that you are a failure or have let yourself or your family down 1   Trouble concentrating on things, such as reading the newspaper or watching television 0   Moving or speaking so slowly that other people could have noticed. Or the opposite - being so fidgety or restless that you have been moving around a lot more than usual 0 "   Thoughts that you would be better off dead, or of hurting yourself in some way -   Total Score 5   If you checked off any problems, how difficult have these problems made it for you to do your work, take care of things at home, or get along with other people? -       Health Habits and Functional and Cognitive Screening:  Functional & Cognitive Status 12/16/2021   Do you have difficulty preparing food and eating? No   Do you have difficulty bathing yourself, getting dressed or grooming yourself? No   Do you have difficulty using the toilet? No   Do you have difficulty moving around from place to place? No   Do you have trouble with steps or getting out of a bed or a chair? No   Current Diet Unhealthy Diet   Dental Exam Up to date   Eye Exam Up to date   Exercise (times per week) 0 times per week        Exercise Frequency Comment -   Current Exercises Include No Regular Exercise   Current Exercise Activities Include -   Do you need help using the phone?  No   Are you deaf or do you have serious difficulty hearing?  Yes   Do you need help with transportation? No   Do you need help shopping? No   Do you need help preparing meals?  No   Do you need help with housework?  No   Do you need help with laundry? No   Do you need help taking your medications? No   Do you need help managing money? No   Do you ever drive or ride in a car without wearing a seat belt? No   Have you felt unusual stress, anger or loneliness in the last month? No   Who do you live with? Spouse   If you need help, do you have trouble finding someone available to you? No   Have you been bothered in the last four weeks by sexual problems? -   Do you have difficulty concentrating, remembering or making decisions? Yes       Age-appropriate Screening Schedule:  Refer to the list below for future screening recommendations based on patient's age, sex and/or medical conditions. Orders for these recommended tests are listed in the plan section. The patient has  been provided with a written plan.    Health Maintenance   Topic Date Due   • DXA SCAN  Never done   • TDAP/TD VACCINES (1 - Tdap) Never done   • MAMMOGRAM  Never done   • ZOSTER VACCINE (2 of 2) 12/09/2020   • LIPID PANEL  09/14/2022   • INFLUENZA VACCINE  Completed              Assessment/Plan   CMS Preventative Services Quick Reference  Risk Factors Identified During Encounter  Fall Risk-High or Moderate  The above risks/problems have been discussed with the patient.  Follow up actions/plans if indicated are seen below in the Assessment/Plan Section.  Pertinent information has been shared with the patient in the After Visit Summary.    Diagnoses and all orders for this visit:    1. Medicare annual wellness visit, subsequent (Primary)    2. Diarrhea, unspecified type  -     Ambulatory Referral to Gastroenterology  -     Basic Metabolic Panel        Follow Up:   Return in about 1 year (around 12/16/2022) for Medicare Wellness.         An After Visit Summary and PPPS were made available to the patient.

## 2021-12-17 LAB
BUN SERPL-MCNC: 7 MG/DL (ref 8–27)
BUN/CREAT SERPL: 8 (ref 12–28)
CALCIUM SERPL-MCNC: 10 MG/DL (ref 8.7–10.3)
CHLORIDE SERPL-SCNC: 106 MMOL/L (ref 96–106)
CO2 SERPL-SCNC: 24 MMOL/L (ref 20–29)
CREAT SERPL-MCNC: 0.89 MG/DL (ref 0.57–1)
GLUCOSE SERPL-MCNC: 126 MG/DL (ref 65–99)
POTASSIUM SERPL-SCNC: 4.3 MMOL/L (ref 3.5–5.2)
SODIUM SERPL-SCNC: 145 MMOL/L (ref 134–144)

## 2021-12-27 DIAGNOSIS — G43.709 CHRONIC MIGRAINE WITHOUT AURA WITHOUT STATUS MIGRAINOSUS, NOT INTRACTABLE: ICD-10-CM

## 2021-12-27 DIAGNOSIS — B35.1 NAIL FUNGUS: ICD-10-CM

## 2021-12-27 RX ORDER — AMITRIPTYLINE HYDROCHLORIDE 10 MG/1
TABLET, FILM COATED ORAL
Qty: 90 TABLET | Refills: 0 | Status: SHIPPED | OUTPATIENT
Start: 2021-12-27 | End: 2022-04-08

## 2021-12-27 RX ORDER — TERBINAFINE HYDROCHLORIDE 250 MG/1
TABLET ORAL
Qty: 30 TABLET | Refills: 0 | OUTPATIENT
Start: 2021-12-27

## 2022-01-10 ENCOUNTER — TELEPHONE (OUTPATIENT)
Dept: FAMILY MEDICINE CLINIC | Facility: CLINIC | Age: 77
End: 2022-01-10

## 2022-01-10 NOTE — TELEPHONE ENCOUNTER
Caller: Bella Dukes    Relationship to patient: Self    Best call back number: [phone]    Date of positive COVID19 test: 1-7-22    COVID19 symptoms: HEADACHE, FATIGUE, NO APPETITE         Additional information or concerns: ASKING IF THERE IS ANYTHING SHE CAN DO TO HELP SYMPTOMS. PATIENT STATES LOTS OF MUCUS.    PLEASE ADVISE

## 2022-01-11 NOTE — TELEPHONE ENCOUNTER
It is really just rest and fluids and quarantine.  Offer her a telehealth appointment if she wants one.  If she becomes short of breath or has a fever that will not go down she will need to go to the ER.

## 2022-01-15 DIAGNOSIS — M19.90 ARTHRITIS: ICD-10-CM

## 2022-01-16 ENCOUNTER — APPOINTMENT (OUTPATIENT)
Dept: GENERAL RADIOLOGY | Facility: HOSPITAL | Age: 77
End: 2022-01-16

## 2022-01-16 ENCOUNTER — HOSPITAL ENCOUNTER (EMERGENCY)
Facility: HOSPITAL | Age: 77
Discharge: HOME | End: 2022-01-16
Attending: EMERGENCY MEDICINE | Admitting: EMERGENCY MEDICINE

## 2022-01-16 VITALS
DIASTOLIC BLOOD PRESSURE: 58 MMHG | RESPIRATION RATE: 16 BRPM | OXYGEN SATURATION: 96 % | BODY MASS INDEX: 41.23 KG/M2 | HEIGHT: 60 IN | SYSTOLIC BLOOD PRESSURE: 140 MMHG | WEIGHT: 210 LBS | HEART RATE: 90 BPM | TEMPERATURE: 99 F

## 2022-01-16 DIAGNOSIS — R05.9 COUGH: Primary | ICD-10-CM

## 2022-01-16 DIAGNOSIS — I10 HYPERTENSION, UNSPECIFIED TYPE: ICD-10-CM

## 2022-01-16 DIAGNOSIS — Z86.79 HISTORY OF HYPERTENSION: ICD-10-CM

## 2022-01-16 DIAGNOSIS — U07.1 COVID: ICD-10-CM

## 2022-01-16 DIAGNOSIS — R06.00 DYSPNEA, UNSPECIFIED TYPE: ICD-10-CM

## 2022-01-16 PROBLEM — R05.8 COUGH PRESENT FOR GREATER THAN 3 WEEKS: Status: ACTIVE | Noted: 2019-08-01

## 2022-01-16 LAB
ALBUMIN SERPL-MCNC: 4.2 G/DL (ref 3.5–5.2)
ALBUMIN/GLOB SERPL: 1.4 G/DL
ALP SERPL-CCNC: 74 U/L (ref 39–117)
ALT SERPL W P-5'-P-CCNC: 19 U/L (ref 1–33)
ANION GAP SERPL CALCULATED.3IONS-SCNC: 12.1 MMOL/L (ref 5–15)
AST SERPL-CCNC: 21 U/L (ref 1–32)
BACTERIA UR QL AUTO: NORMAL /HPF
BASOPHILS # BLD AUTO: 0.03 10*3/MM3 (ref 0–0.2)
BASOPHILS NFR BLD AUTO: 0.5 % (ref 0–1.5)
BILIRUB SERPL-MCNC: 0.2 MG/DL (ref 0–1.2)
BILIRUB UR QL STRIP: NEGATIVE
BUN SERPL-MCNC: 10 MG/DL (ref 8–23)
BUN/CREAT SERPL: 12 (ref 7–25)
CALCIUM SPEC-SCNC: 9.9 MG/DL (ref 8.6–10.5)
CHLORIDE SERPL-SCNC: 106 MMOL/L (ref 98–107)
CLARITY UR: CLEAR
CO2 SERPL-SCNC: 23.9 MMOL/L (ref 22–29)
COLOR UR: YELLOW
CREAT SERPL-MCNC: 0.83 MG/DL (ref 0.57–1)
DEPRECATED RDW RBC AUTO: 47.5 FL (ref 37–54)
EOSINOPHIL # BLD AUTO: 0.04 10*3/MM3 (ref 0–0.4)
EOSINOPHIL NFR BLD AUTO: 0.7 % (ref 0.3–6.2)
ERYTHROCYTE [DISTWIDTH] IN BLOOD BY AUTOMATED COUNT: 12.7 % (ref 12.3–15.4)
GFR SERPL CREATININE-BSD FRML MDRD: 67 ML/MIN/1.73
GLOBULIN UR ELPH-MCNC: 2.9 GM/DL
GLUCOSE SERPL-MCNC: 94 MG/DL (ref 65–99)
GLUCOSE UR STRIP-MCNC: NEGATIVE MG/DL
HCT VFR BLD AUTO: 37.9 % (ref 34–46.6)
HGB BLD-MCNC: 12.6 G/DL (ref 12–15.9)
HGB UR QL STRIP.AUTO: NEGATIVE
HOLD SPECIMEN: NORMAL
HOLD SPECIMEN: NORMAL
HYALINE CASTS UR QL AUTO: NORMAL /LPF
IMM GRANULOCYTES # BLD AUTO: 0.01 10*3/MM3 (ref 0–0.05)
IMM GRANULOCYTES NFR BLD AUTO: 0.2 % (ref 0–0.5)
KETONES UR QL STRIP: NEGATIVE
LEUKOCYTE ESTERASE UR QL STRIP.AUTO: ABNORMAL
LYMPHOCYTES # BLD AUTO: 1.56 10*3/MM3 (ref 0.7–3.1)
LYMPHOCYTES NFR BLD AUTO: 27.6 % (ref 19.6–45.3)
MCH RBC QN AUTO: 33.6 PG (ref 26.6–33)
MCHC RBC AUTO-ENTMCNC: 33.2 G/DL (ref 31.5–35.7)
MCV RBC AUTO: 101.1 FL (ref 79–97)
MONOCYTES # BLD AUTO: 0.52 10*3/MM3 (ref 0.1–0.9)
MONOCYTES NFR BLD AUTO: 9.2 % (ref 5–12)
NEUTROPHILS NFR BLD AUTO: 3.49 10*3/MM3 (ref 1.7–7)
NEUTROPHILS NFR BLD AUTO: 61.8 % (ref 42.7–76)
NITRITE UR QL STRIP: NEGATIVE
NRBC BLD AUTO-RTO: 0 /100 WBC (ref 0–0.2)
NT-PROBNP SERPL-MCNC: 179 PG/ML (ref 0–1800)
PH UR STRIP.AUTO: 5.5 [PH] (ref 5–8)
PLATELET # BLD AUTO: 304 10*3/MM3 (ref 140–450)
PMV BLD AUTO: 9.4 FL (ref 6–12)
POTASSIUM SERPL-SCNC: 4 MMOL/L (ref 3.5–5.2)
PROT SERPL-MCNC: 7.1 G/DL (ref 6–8.5)
PROT UR QL STRIP: NEGATIVE
RBC # BLD AUTO: 3.75 10*6/MM3 (ref 3.77–5.28)
RBC # UR STRIP: NORMAL /HPF
REF LAB TEST METHOD: NORMAL
SODIUM SERPL-SCNC: 142 MMOL/L (ref 136–145)
SP GR UR STRIP: 1.01 (ref 1–1.03)
SQUAMOUS #/AREA URNS HPF: NORMAL /HPF
TROPONIN T SERPL-MCNC: <0.01 NG/ML (ref 0–0.03)
UROBILINOGEN UR QL STRIP: ABNORMAL
WBC # UR STRIP: NORMAL /HPF
WBC NRBC COR # BLD: 5.65 10*3/MM3 (ref 3.4–10.8)
WHOLE BLOOD HOLD SPECIMEN: NORMAL
WHOLE BLOOD HOLD SPECIMEN: NORMAL

## 2022-01-16 PROCEDURE — 80053 COMPREHEN METABOLIC PANEL: CPT | Performed by: EMERGENCY MEDICINE

## 2022-01-16 PROCEDURE — 93010 ELECTROCARDIOGRAM REPORT: CPT | Performed by: INTERNAL MEDICINE

## 2022-01-16 PROCEDURE — 93005 ELECTROCARDIOGRAM TRACING: CPT

## 2022-01-16 PROCEDURE — 84484 ASSAY OF TROPONIN QUANT: CPT | Performed by: EMERGENCY MEDICINE

## 2022-01-16 PROCEDURE — 71046 X-RAY EXAM CHEST 2 VIEWS: CPT

## 2022-01-16 PROCEDURE — 99283 EMERGENCY DEPT VISIT LOW MDM: CPT

## 2022-01-16 PROCEDURE — 83880 ASSAY OF NATRIURETIC PEPTIDE: CPT | Performed by: EMERGENCY MEDICINE

## 2022-01-16 PROCEDURE — 93005 ELECTROCARDIOGRAM TRACING: CPT | Performed by: EMERGENCY MEDICINE

## 2022-01-16 PROCEDURE — 85025 COMPLETE CBC W/AUTO DIFF WBC: CPT | Performed by: EMERGENCY MEDICINE

## 2022-01-16 PROCEDURE — 81001 URINALYSIS AUTO W/SCOPE: CPT | Performed by: EMERGENCY MEDICINE

## 2022-01-16 RX ORDER — SODIUM CHLORIDE 0.9 % (FLUSH) 0.9 %
10 SYRINGE (ML) INJECTION AS NEEDED
Status: DISCONTINUED | OUTPATIENT
Start: 2022-01-16 | End: 2022-01-17 | Stop reason: HOSPADM

## 2022-01-17 LAB — QT INTERVAL: 395 MS

## 2022-01-17 RX ORDER — MELOXICAM 15 MG/1
TABLET ORAL
Qty: 90 TABLET | Refills: 0 | Status: SHIPPED | OUTPATIENT
Start: 2022-01-17 | End: 2022-04-18

## 2022-01-17 NOTE — ED TRIAGE NOTES
"Pt to ED from [facility] with c/o SOA and fatigue since testing positive for covid 1/5.  Pt states, \" I thought I may have pneumonia.\"      Pt wearing mask, staff wearing appropriate PPE.  "

## 2022-01-17 NOTE — DISCHARGE INSTRUCTIONS
Continue current medications, stay well-hydrated, continue with supportive care measures at home, PCP follow-up as needed, ED return for worsening symptoms as needed.

## 2022-01-17 NOTE — ED PROVIDER NOTES
EMERGENCY DEPARTMENT ENCOUNTER    Room Number:  27/27  Date of encounter:  1/16/2022  PCP: Katina Montez MD  Historian: Patient      HPI:  Chief Complaint: Cough  A complete HPI/ROS/PMH/PSH/SH/FH are unobtainable due to: None    Context: Bella Dukes is a 76 y.o. female who presents to the ED via private vehicle for evaluation for possible pneumonia.  Patient was recently positive for COVID back on 5 January, has had persistent cough and some mild exertional dyspnea.  Patient denies any fevers, chills, vomiting, has had some mild diarrhea.  Has had occasional nausea.  Drinking fluids well.  Patient was seen in urgent care and referred here for evaluation.  She is vaccinated.      MEDICAL RECORD REVIEW    Urgent care note reviewed, patient referred to the ER for cough and high blood pressure.  On accompanying paperwork with the patient, blood pressure reading was 190/140, I suspect that this reading was an error    PAST MEDICAL HISTORY  Active Ambulatory Problems     Diagnosis Date Noted   • Dilatation of aorta (HCC) 01/21/2016   • Mild intermittent asthma without complication 01/21/2016   • Constipation 01/21/2016   • Depression 01/21/2016   • Gastroesophageal reflux disease without esophagitis 01/21/2016   • Hyperlipidemia 01/21/2016   • Essential hypertension 01/21/2016   • Low back pain 01/21/2016   • Pericardial effusion 01/21/2016   • Morbid obesity (HCC) 01/21/2016   • Thoracic back pain 02/24/2016   • Dysphagia 03/22/2016   • Fatigue 03/22/2016   • Sleep apnea 03/28/2016   • Medicare annual wellness visit, subsequent 07/06/2016   • Acute non-recurrent maxillary sinusitis 01/05/2017   • Arm pain, anterior, right 10/16/2017   • Knee pain 04/06/2018   • Choking 08/01/2019   • Nasal congestion 08/01/2019   • Cough present for greater than 3 weeks 08/01/2019   • PND (post-nasal drip) 08/01/2019   • Asthma 11/09/2020   • Arthritis 12/10/2020   • Allergies 12/10/2020   • Chronic migraine without aura without  status migrainosus, not intractable 12/10/2020   • Current moderate episode of major depressive disorder without prior episode (HCC) 09/14/2021   • Nail fungus 09/14/2021   • Diarrhea 12/16/2021     Resolved Ambulatory Problems     Diagnosis Date Noted   • No Resolved Ambulatory Problems     Past Medical History:   Diagnosis Date   • Allergic rhinitis    • Allergy    • Angina pectoris (HCC)    • Anxiety    • Carpal tunnel syndrome    • Chafing    • Chronic lower back pain    • Former smoker    • GERD (gastroesophageal reflux disease)    • Hemorrhoids    • High blood pressure    • High cholesterol    • Hoarseness    • Hypertension    • Joint pain    • Migraine headache    • Migraines    • Neck pain    • Obesity    • Other urinary problems    • Palpitations    • Plantar fasciitis    • Pneumonia    • Seasonal allergies          PAST SURGICAL HISTORY  Past Surgical History:   Procedure Laterality Date   • BARIATRIC SURGERY     • COLONOSCOPY  11/18/2013   • HYSTERECTOMY      Not due to cancer   • KNEE SURGERY Right    • LAPAROSCOPIC GASTRIC BANDING     • REPLACEMENT TOTAL KNEE     • TONSILLECTOMY           FAMILY HISTORY  Family History   Problem Relation Age of Onset   • Stroke Mother         ischemic   • Arthritis Mother    • Stroke Father         ischemic   • Arthritis Father    • Liver cancer Father    • Stroke Paternal Grandfather         ischemic   • Arthritis Paternal Grandfather    • Asthma Brother    • Liver cancer Brother    • COPD Brother    • Arthritis Maternal Grandfather    • Arthritis Paternal Grandmother    • Arthritis Maternal Grandmother          SOCIAL HISTORY  Social History     Socioeconomic History   • Marital status: Unknown   Tobacco Use   • Smoking status: Former Smoker   • Smokeless tobacco: Never Used   • Tobacco comment: smoked less than 1 pack per day for 5 years   Substance and Sexual Activity   • Alcohol use: No   • Drug use: No   • Sexual activity: Defer          ALLERGIES  Epinephrine        REVIEW OF SYSTEMS  Review of Systems     All systems reviewed and negative except for those discussed in HPI.       PHYSICAL EXAM    I have reviewed the triage vital signs and nursing notes.    ED Triage Vitals   Temp Heart Rate Resp BP SpO2   01/16/22 1908 01/16/22 1908 01/16/22 1908 01/16/22 1911 01/16/22 1908   99 °F (37.2 °C) 111 20 162/94 92 %      Temp src Heart Rate Source Patient Position BP Location FiO2 (%)   01/16/22 1908 -- 01/16/22 1911 01/16/22 1911 --   Tympanic  Standing Left arm        Physical Exam  General: No acute distress, nontoxic  HEENT: Mucous membranes moist, atraumatic, EOMI  Neck: Full ROM  Pulm: Symmetric chest rise, nonlabored, lungs CTAB  Cardiovascular: Regular rate and rhythm, intact distal pulses  GI: Soft, nontender, nondistended, no rebound, no guarding, bowel sounds present  MSK: Full ROM, no deformity  Skin: Warm, dry  Neuro: Awake, alert, oriented x 4, GCS 15, moving all extremities, no focal deficits  Psych: Calm, cooperative      I wore an N95 mask, gown, faceshield, and gloves used during this encounter. Patient in surgical mask.      LAB RESULTS  Recent Results (from the past 24 hour(s))   ECG 12 Lead    Collection Time: 01/16/22  7:17 PM   Result Value Ref Range    QT Interval 395 ms   Comprehensive Metabolic Panel    Collection Time: 01/16/22  8:06 PM    Specimen: Blood   Result Value Ref Range    Glucose 94 65 - 99 mg/dL    BUN 10 8 - 23 mg/dL    Creatinine 0.83 0.57 - 1.00 mg/dL    Sodium 142 136 - 145 mmol/L    Potassium 4.0 3.5 - 5.2 mmol/L    Chloride 106 98 - 107 mmol/L    CO2 23.9 22.0 - 29.0 mmol/L    Calcium 9.9 8.6 - 10.5 mg/dL    Total Protein 7.1 6.0 - 8.5 g/dL    Albumin 4.20 3.50 - 5.20 g/dL    ALT (SGPT) 19 1 - 33 U/L    AST (SGOT) 21 1 - 32 U/L    Alkaline Phosphatase 74 39 - 117 U/L    Total Bilirubin 0.2 0.0 - 1.2 mg/dL    eGFR Non African Amer 67 >60 mL/min/1.73    Globulin 2.9 gm/dL    A/G Ratio 1.4 g/dL     BUN/Creatinine Ratio 12.0 7.0 - 25.0    Anion Gap 12.1 5.0 - 15.0 mmol/L   BNP    Collection Time: 01/16/22  8:06 PM    Specimen: Blood   Result Value Ref Range    proBNP 179.0 0.0-1,800.0 pg/mL   Troponin    Collection Time: 01/16/22  8:06 PM    Specimen: Blood   Result Value Ref Range    Troponin T <0.010 0.000 - 0.030 ng/mL   Green Top (Gel)    Collection Time: 01/16/22  8:06 PM   Result Value Ref Range    Extra Tube Hold for add-ons.    Lavender Top    Collection Time: 01/16/22  8:06 PM   Result Value Ref Range    Extra Tube hold for add-on    Gold Top - SST    Collection Time: 01/16/22  8:06 PM   Result Value Ref Range    Extra Tube Hold for add-ons.    Light Blue Top    Collection Time: 01/16/22  8:06 PM   Result Value Ref Range    Extra Tube hold for add-on    CBC Auto Differential    Collection Time: 01/16/22  8:06 PM    Specimen: Blood   Result Value Ref Range    WBC 5.65 3.40 - 10.80 10*3/mm3    RBC 3.75 (L) 3.77 - 5.28 10*6/mm3    Hemoglobin 12.6 12.0 - 15.9 g/dL    Hematocrit 37.9 34.0 - 46.6 %    .1 (H) 79.0 - 97.0 fL    MCH 33.6 (H) 26.6 - 33.0 pg    MCHC 33.2 31.5 - 35.7 g/dL    RDW 12.7 12.3 - 15.4 %    RDW-SD 47.5 37.0 - 54.0 fl    MPV 9.4 6.0 - 12.0 fL    Platelets 304 140 - 450 10*3/mm3    Neutrophil % 61.8 42.7 - 76.0 %    Lymphocyte % 27.6 19.6 - 45.3 %    Monocyte % 9.2 5.0 - 12.0 %    Eosinophil % 0.7 0.3 - 6.2 %    Basophil % 0.5 0.0 - 1.5 %    Immature Grans % 0.2 0.0 - 0.5 %    Neutrophils, Absolute 3.49 1.70 - 7.00 10*3/mm3    Lymphocytes, Absolute 1.56 0.70 - 3.10 10*3/mm3    Monocytes, Absolute 0.52 0.10 - 0.90 10*3/mm3    Eosinophils, Absolute 0.04 0.00 - 0.40 10*3/mm3    Basophils, Absolute 0.03 0.00 - 0.20 10*3/mm3    Immature Grans, Absolute 0.01 0.00 - 0.05 10*3/mm3    nRBC 0.0 0.0 - 0.2 /100 WBC   Urinalysis With Microscopic If Indicated (No Culture) - Urine, Clean Catch    Collection Time: 01/16/22  8:21 PM    Specimen: Urine, Clean Catch   Result Value Ref Range    Color,  UA Yellow Yellow, Straw    Appearance, UA Clear Clear    pH, UA 5.5 5.0 - 8.0    Specific Gravity, UA 1.013 1.005 - 1.030    Glucose, UA Negative Negative    Ketones, UA Negative Negative    Bilirubin, UA Negative Negative    Blood, UA Negative Negative    Protein, UA Negative Negative    Leuk Esterase, UA Trace (A) Negative    Nitrite, UA Negative Negative    Urobilinogen, UA 0.2 E.U./dL 0.2 - 1.0 E.U./dL   Urinalysis, Microscopic Only - Urine, Clean Catch    Collection Time: 01/16/22  8:21 PM    Specimen: Urine, Clean Catch   Result Value Ref Range    RBC, UA 0-2 None Seen, 0-2 /HPF    WBC, UA 0-2 None Seen, 0-2 /HPF    Bacteria, UA None Seen None Seen /HPF    Squamous Epithelial Cells, UA 0-2 None Seen, 0-2 /HPF    Hyaline Casts, UA 0-2 None Seen /LPF    Methodology Automated Microscopy        Ordered the above labs and independently reviewed the results.        RADIOLOGY  XR Chest 2 View    Result Date: 1/16/2022  XR CHEST 2 VW-  01/16/2022  HISTORY: Covid positive. Fatigue. Shortness of breath.  Heart size is mildly enlarged. Lungs appear free of acute infiltrates. There is minimal aortic calcification.  A gastric band is seen at the GE junction.      1. Cardiomegaly. 2. No evidence of acute pneumonia.  This report was finalized on 1/16/2022 7:50 PM by Dr. Schuyler Aguayo M.D.      XR Chest 2 View    Result Date: 1/16/2022  XR CHEST 2 VW-  HISTORY: Female who is 76 years-old,  cough  TECHNIQUE: Frontal and lateral views of the chest  COMPARISON: 11/29/2016  FINDINGS: The heart is enlarged. Aorta is tortuous, calcified. Pulmonary vasculature is unremarkable. No focal pulmonary consolidation, pleural effusion, or pneumothorax. Gastric band procedure changes apparent. No acute osseous process.      No focal pulmonary consolidation. Cardiomegaly. Tortuous aorta.  This report was finalized on 1/16/2022 4:55 PM by Dr. Heriberto Hudson M.D.        I ordered the above noted radiological studies. Reviewed by me.   See dictation for official radiology interpretation.      PROCEDURES    Procedures      MEDICATIONS GIVEN IN ER    Medications   sodium chloride 0.9 % flush 10 mL (has no administration in time range)         PROGRESS, DATA ANALYSIS, CONSULTS, AND MEDICAL DECISION MAKING    All labs have been independently reviewed by me.  All radiology studies have been reviewed by me and discussed with radiologist dictating the report.   EKG's independently viewed and interpreted by me.  Discussion below represents my analysis of pertinent findings related to patient's condition, differential diagnosis, treatment plan and final disposition.    Initial concern for COVID-pneumonia, heart failure, renal failure, electrolyte abnormalities, bacterial pneumonia, among others.    ED Course as of 01/16/22 2130   Sun Jan 16, 2022 2043 96% room air   [DC]   2047 WBC: 5.65 [DC]   2047 Hemoglobin: 12.6 [DC]   2047 XR Chest 2 View  reviewed [DC]   2110 Glucose: 94 [DC]   2110 BUN: 10 [DC]   2110 Creatinine: 0.83 [DC]   2110 Sodium: 142 [DC]   2110 Potassium: 4.0 [DC]   2110 Troponin T: <0.010 [DC]   2126 proBNP: 179.0 [DC]   2126 Patient is well-appearing overall, no significant concerning hypertension.  No pneumonia.  No hypoxia.  Safer discharge with continued supportive care for her recent COVID diagnosis.  Outpatient PCP follow-up as needed, ED return for worsening symptoms as needed. [DC]      ED Course User Index  [DC] Patrick Lo MD       AS OF 21:30 EST VITALS:    BP - 141/59  HR - 87  TEMP - 99 °F (37.2 °C) (Tympanic)  02 SATS - 95%        DIAGNOSIS  Final diagnoses:   Cough   Dyspnea, unspecified type   COVID   Hypertension, unspecified type   History of hypertension         DISPOSITION  DISCHARGE    Patient discharged in stable condition.    Reviewed implications of results, diagnosis, meds, responsibility to follow up, warning signs and symptoms of possible worsening, potential complications and reasons to return to  ER.    Patient/Family voiced understanding of above instructions.    Discussed plan for discharge, as there is no emergent indication for admission. Patient referred to primary care provider for BP management due to today's BP. Pt/family is agreeable and understands need for follow up and repeat testing.  Pt is aware that discharge does not mean that nothing is wrong but it indicates no emergency is present that requires admission and they must continue care with follow-up as given below or physician of their choice.     FOLLOW-UP  [facility] Emergency Department  [address] [facility] [address] [phone]    As needed, If symptoms worsen    Katina Montez MD  [address] [facility] [address]  [facility] [id number] [phone]    Schedule an appointment as soon as possible for a visit   As needed         Medication List      No changes were made to your prescriptions during this visit.                    Patrick Lo MD  01/16/22 8241

## 2022-01-17 NOTE — ED NOTES
"Pt states \"I have had some increased SOB when I am up and moving around. I just recently had COVID, and I have this whistle cough that I cant get rid of. My ribs hurt from coughing so much as well as my right arm.\"    Pt has infrequent croupy sounding cough, appears to be in NAD at this time and complains of no chest pain.     Patient was placed in face mask during first look triage.  Patient was wearing a face mask throughout encounter.  I wore personal protective equipment throughout the encounter.  Hand hygiene was performed before and after patient encounter.           Helena Valadez, RN  01/16/22 2027    "

## 2022-01-27 DIAGNOSIS — T78.40XD ALLERGY, SUBSEQUENT ENCOUNTER: ICD-10-CM

## 2022-01-27 RX ORDER — MONTELUKAST SODIUM 10 MG/1
TABLET ORAL
Qty: 90 TABLET | Refills: 0 | Status: SHIPPED | OUTPATIENT
Start: 2022-01-27 | End: 2022-05-09

## 2022-01-27 NOTE — TELEPHONE ENCOUNTER
Rx Refill Note  Requested Prescriptions     Pending Prescriptions Disp Refills   • montelukast (SINGULAIR) 10 MG tablet [Pharmacy Med Name: Montelukast Sodium Oral Tablet 10 MG] 90 tablet 0     Sig: TAKE 1 TABLET BY MOUTH EVERY DAY      Last office visit with prescribing clinician: 12/16/2021      Next office visit with prescribing clinician: 12/20/2022            Lydia Nash MA  01/27/22, 16:44 EST

## 2022-01-31 DIAGNOSIS — B35.1 NAIL FUNGUS: ICD-10-CM

## 2022-02-01 RX ORDER — TERBINAFINE HYDROCHLORIDE 250 MG/1
TABLET ORAL
Qty: 30 TABLET | Refills: 0 | Status: SHIPPED | OUTPATIENT
Start: 2022-02-01 | End: 2022-02-28

## 2022-02-01 NOTE — TELEPHONE ENCOUNTER
Rx Refill Note  Requested Prescriptions     Pending Prescriptions Disp Refills   • terbinafine (lamiSIL) 250 MG tablet [Pharmacy Med Name: Terbinafine HCl Oral Tablet 250 MG] 30 tablet 0     Sig: TAKE 1 TABLET BY MOUTH EVERY DAY      Last office visit with prescribing clinician: 12/16/2021      Next office visit with prescribing clinician: 12/20/2022            Lydia Nash MA  02/01/22, 14:58 EST

## 2022-02-24 DIAGNOSIS — B35.1 NAIL FUNGUS: ICD-10-CM

## 2022-02-28 RX ORDER — TERBINAFINE HYDROCHLORIDE 250 MG/1
TABLET ORAL
Qty: 30 TABLET | Refills: 0 | Status: SHIPPED | OUTPATIENT
Start: 2022-02-28 | End: 2022-12-20

## 2022-03-09 DIAGNOSIS — F32.A DEPRESSION, UNSPECIFIED DEPRESSION TYPE: ICD-10-CM

## 2022-03-09 DIAGNOSIS — E78.5 HYPERLIPIDEMIA, UNSPECIFIED HYPERLIPIDEMIA TYPE: ICD-10-CM

## 2022-03-09 RX ORDER — BUPROPION HYDROCHLORIDE 300 MG/1
TABLET ORAL
Qty: 90 TABLET | Refills: 0 | Status: SHIPPED | OUTPATIENT
Start: 2022-03-09 | End: 2022-06-09 | Stop reason: SDUPTHER

## 2022-03-09 RX ORDER — ATORVASTATIN CALCIUM 40 MG/1
TABLET, FILM COATED ORAL
Qty: 90 TABLET | Refills: 0 | Status: SHIPPED | OUTPATIENT
Start: 2022-03-09 | End: 2022-09-19

## 2022-03-09 RX ORDER — BUPROPION HYDROCHLORIDE 150 MG/1
TABLET ORAL
Qty: 90 TABLET | Refills: 0 | Status: SHIPPED | OUTPATIENT
Start: 2022-03-09 | End: 2022-06-09 | Stop reason: SDUPTHER

## 2022-03-23 DIAGNOSIS — K21.9 GASTROESOPHAGEAL REFLUX DISEASE WITHOUT ESOPHAGITIS: ICD-10-CM

## 2022-03-24 RX ORDER — PANTOPRAZOLE SODIUM 40 MG/1
TABLET, DELAYED RELEASE ORAL
Qty: 90 TABLET | Refills: 0 | Status: SHIPPED | OUTPATIENT
Start: 2022-03-24 | End: 2022-06-27

## 2022-04-08 DIAGNOSIS — G43.709 CHRONIC MIGRAINE WITHOUT AURA WITHOUT STATUS MIGRAINOSUS, NOT INTRACTABLE: ICD-10-CM

## 2022-04-08 RX ORDER — AMITRIPTYLINE HYDROCHLORIDE 10 MG/1
TABLET, FILM COATED ORAL
Qty: 90 TABLET | Refills: 0 | Status: SHIPPED | OUTPATIENT
Start: 2022-04-08 | End: 2022-07-16

## 2022-04-16 DIAGNOSIS — M19.90 ARTHRITIS: ICD-10-CM

## 2022-04-18 RX ORDER — MELOXICAM 15 MG/1
TABLET ORAL
Qty: 90 TABLET | Refills: 0 | Status: SHIPPED | OUTPATIENT
Start: 2022-04-18 | End: 2022-07-25

## 2022-05-06 DIAGNOSIS — T78.40XD ALLERGY, SUBSEQUENT ENCOUNTER: ICD-10-CM

## 2022-05-09 RX ORDER — MONTELUKAST SODIUM 10 MG/1
TABLET ORAL
Qty: 90 TABLET | Refills: 0 | Status: SHIPPED | OUTPATIENT
Start: 2022-05-09 | End: 2022-08-09

## 2022-06-09 DIAGNOSIS — R05.8 COUGH PRESENT FOR GREATER THAN 3 WEEKS: ICD-10-CM

## 2022-06-09 DIAGNOSIS — F32.A DEPRESSION, UNSPECIFIED DEPRESSION TYPE: ICD-10-CM

## 2022-06-09 RX ORDER — BUPROPION HYDROCHLORIDE 300 MG/1
300 TABLET ORAL DAILY
Qty: 90 TABLET | Refills: 3 | Status: SHIPPED | OUTPATIENT
Start: 2022-06-09

## 2022-06-09 RX ORDER — BUPROPION HYDROCHLORIDE 150 MG/1
150 TABLET ORAL DAILY
Qty: 90 TABLET | Refills: 3 | Status: SHIPPED | OUTPATIENT
Start: 2022-06-09

## 2022-06-09 NOTE — TELEPHONE ENCOUNTER
Rx Refill Note  Requested Prescriptions     Pending Prescriptions Disp Refills   • buPROPion XL (WELLBUTRIN XL) 150 MG 24 hr tablet 90 tablet 0     Sig: Take 1 tablet by mouth Daily.   • buPROPion XL (WELLBUTRIN XL) 300 MG 24 hr tablet 90 tablet 0     Sig: Take 1 tablet by mouth Daily.      Last office visit with prescribing clinician: 12/16/2021      Next office visit with prescribing clinician: 12/20/2022            Lela Doran MA  06/09/22, 09:44 EDT     Last Filled 03/09/22

## 2022-06-16 DIAGNOSIS — I10 ESSENTIAL HYPERTENSION: ICD-10-CM

## 2022-06-16 RX ORDER — LOSARTAN POTASSIUM 100 MG/1
100 TABLET ORAL DAILY
Qty: 90 TABLET | Refills: 1 | Status: SHIPPED | OUTPATIENT
Start: 2022-06-16 | End: 2023-02-02 | Stop reason: SDUPTHER

## 2022-06-16 NOTE — TELEPHONE ENCOUNTER
Rx Refill Note  Requested Prescriptions     Pending Prescriptions Disp Refills   • losartan (COZAAR) 100 MG tablet [Pharmacy Med Name: Losartan Potassium Oral Tablet 100 MG] 90 tablet 0     Sig: TAKE 1 TABLET BY MOUTH EVERY DAY      Last office visit with prescribing clinician: 12/16/2021      Next office visit with prescribing clinician: 12/20/2022     Please add Last Relevant Lab D  Tenisha Jimenez MA  06/16/22, 10:25 EDT

## 2022-06-25 DIAGNOSIS — K21.9 GASTROESOPHAGEAL REFLUX DISEASE WITHOUT ESOPHAGITIS: ICD-10-CM

## 2022-06-27 RX ORDER — PANTOPRAZOLE SODIUM 40 MG/1
TABLET, DELAYED RELEASE ORAL
Qty: 90 TABLET | Refills: 0 | Status: SHIPPED | OUTPATIENT
Start: 2022-06-27 | End: 2022-10-13 | Stop reason: SDUPTHER

## 2022-06-27 NOTE — TELEPHONE ENCOUNTER
Rx Refill Note  Requested Prescriptions     Pending Prescriptions Disp Refills   • pantoprazole (PROTONIX) 40 MG EC tablet [Pharmacy Med Name: Pantoprazole Sodium Oral Tablet Delayed Release 40 MG] 90 tablet 0     Sig: TAKE 1 TABLET BY MOUTH EVERY DAY      Last office visit with prescribing clinician: 12/16/2021      Next office visit with prescribing clinician: 12/20/2022    LAST REFILL 03/24/2022            Deepti Mancia MA  06/27/22, 09:12 EDT

## 2022-07-15 DIAGNOSIS — G43.709 CHRONIC MIGRAINE WITHOUT AURA WITHOUT STATUS MIGRAINOSUS, NOT INTRACTABLE: ICD-10-CM

## 2022-07-15 DIAGNOSIS — F32.A DEPRESSION, UNSPECIFIED DEPRESSION TYPE: ICD-10-CM

## 2022-07-16 RX ORDER — AMITRIPTYLINE HYDROCHLORIDE 10 MG/1
TABLET, FILM COATED ORAL
Qty: 90 TABLET | Refills: 2 | Status: SHIPPED | OUTPATIENT
Start: 2022-07-16 | End: 2022-12-20 | Stop reason: SDUPTHER

## 2022-07-18 RX ORDER — CITALOPRAM 40 MG/1
TABLET ORAL
Qty: 90 TABLET | Refills: 0 | Status: SHIPPED | OUTPATIENT
Start: 2022-07-18 | End: 2022-10-17

## 2022-07-22 DIAGNOSIS — M19.90 ARTHRITIS: ICD-10-CM

## 2022-07-25 RX ORDER — MELOXICAM 15 MG/1
TABLET ORAL
Qty: 90 TABLET | Refills: 0 | Status: SHIPPED | OUTPATIENT
Start: 2022-07-25 | End: 2022-11-01

## 2022-08-09 DIAGNOSIS — T78.40XD ALLERGY, SUBSEQUENT ENCOUNTER: ICD-10-CM

## 2022-08-09 RX ORDER — MONTELUKAST SODIUM 10 MG/1
TABLET ORAL
Qty: 90 TABLET | Refills: 0 | Status: SHIPPED | OUTPATIENT
Start: 2022-08-09 | End: 2022-11-26

## 2022-09-18 DIAGNOSIS — E78.5 HYPERLIPIDEMIA, UNSPECIFIED HYPERLIPIDEMIA TYPE: ICD-10-CM

## 2022-09-19 RX ORDER — ATORVASTATIN CALCIUM 40 MG/1
TABLET, FILM COATED ORAL
Qty: 90 TABLET | Refills: 0 | Status: SHIPPED | OUTPATIENT
Start: 2022-09-19 | End: 2023-01-25 | Stop reason: SDUPTHER

## 2022-10-13 DIAGNOSIS — K21.9 GASTROESOPHAGEAL REFLUX DISEASE WITHOUT ESOPHAGITIS: ICD-10-CM

## 2022-10-13 RX ORDER — PANTOPRAZOLE SODIUM 40 MG/1
40 TABLET, DELAYED RELEASE ORAL DAILY
Qty: 90 TABLET | Refills: 2 | Status: SHIPPED | OUTPATIENT
Start: 2022-10-13

## 2022-10-16 DIAGNOSIS — F32.A DEPRESSION, UNSPECIFIED DEPRESSION TYPE: ICD-10-CM

## 2022-10-17 RX ORDER — CITALOPRAM 40 MG/1
TABLET ORAL
Qty: 90 TABLET | Refills: 0 | Status: SHIPPED | OUTPATIENT
Start: 2022-10-17 | End: 2023-03-27

## 2022-10-20 ENCOUNTER — OFFICE VISIT (OUTPATIENT)
Dept: FAMILY MEDICINE CLINIC | Facility: CLINIC | Age: 77
End: 2022-10-20

## 2022-10-20 VITALS
WEIGHT: 214 LBS | TEMPERATURE: 95.3 F | HEART RATE: 91 BPM | DIASTOLIC BLOOD PRESSURE: 81 MMHG | HEIGHT: 60 IN | SYSTOLIC BLOOD PRESSURE: 137 MMHG | BODY MASS INDEX: 42.01 KG/M2 | OXYGEN SATURATION: 94 %

## 2022-10-20 DIAGNOSIS — R13.10 DYSPHAGIA, UNSPECIFIED TYPE: ICD-10-CM

## 2022-10-20 DIAGNOSIS — Z78.0 POSTMENOPAUSAL: ICD-10-CM

## 2022-10-20 DIAGNOSIS — R53.82 CHRONIC FATIGUE: ICD-10-CM

## 2022-10-20 DIAGNOSIS — E78.49 OTHER HYPERLIPIDEMIA: ICD-10-CM

## 2022-10-20 DIAGNOSIS — E78.5 DYSLIPIDEMIA: ICD-10-CM

## 2022-10-20 DIAGNOSIS — I10 ESSENTIAL HYPERTENSION: ICD-10-CM

## 2022-10-20 DIAGNOSIS — R06.02 SHORTNESS OF BREATH: ICD-10-CM

## 2022-10-20 DIAGNOSIS — E55.9 VITAMIN D DEFICIENCY: ICD-10-CM

## 2022-10-20 DIAGNOSIS — R05.3 CHRONIC COUGH: Primary | ICD-10-CM

## 2022-10-20 DIAGNOSIS — E66.01 MORBID OBESITY: ICD-10-CM

## 2022-10-20 PROCEDURE — 71046 X-RAY EXAM CHEST 2 VIEWS: CPT | Performed by: FAMILY MEDICINE

## 2022-10-20 PROCEDURE — 99214 OFFICE O/P EST MOD 30 MIN: CPT | Performed by: FAMILY MEDICINE

## 2022-10-20 NOTE — PROGRESS NOTES
Subjective   Bella Dukes is a 77 y.o. female.     Chief Complaint   Patient presents with   • Cough   • Fatigue       History of Present Illness   Cough started again several months after Covid in January 2022  Having severe fatigue  Patient is complaining on  Difficulty swallowing.  She is complaining  Being short of air with just minimal activity.  The following portions of the patient's history were reviewed and updated as appropriate: allergies, current medications, past family history, past medical history, past social history, past surgical history and problem list.    Past Medical History:   Diagnosis Date   • Allergic rhinitis    • Allergy    • Angina pectoris (HCC)    • Anxiety    • Arthritis    • Asthma    • Carpal tunnel syndrome    • Chafing    • Chronic lower back pain    • Constipation    • Depression    • Former smoker    • GERD (gastroesophageal reflux disease)    • Hemorrhoids    • High blood pressure    • High cholesterol    • Hoarseness    • Hypertension    • Joint pain     both hips   • Knee pain     soft tissue   • Migraine headache    • Migraines    • Morbid obesity (HCC)    • Neck pain    • Obesity    • Other urinary problems     temporary urinary loss of control with cough and/or sneeze   • Palpitations    • Plantar fasciitis    • Pneumonia    • Seasonal allergies    • Sleep apnea        Past Surgical History:   Procedure Laterality Date   • BARIATRIC SURGERY     • COLONOSCOPY  11/18/2013   • HYSTERECTOMY      Not due to cancer   • KNEE SURGERY Right    • LAPAROSCOPIC GASTRIC BANDING     • REPLACEMENT TOTAL KNEE     • TONSILLECTOMY         Family History   Problem Relation Age of Onset   • Stroke Mother         ischemic   • Arthritis Mother    • Stroke Father         ischemic   • Arthritis Father    • Liver cancer Father    • Stroke Paternal Grandfather         ischemic   • Arthritis Paternal Grandfather    • Asthma Brother    • Liver cancer Brother    • COPD Brother    • Arthritis Maternal  Grandfather    • Arthritis Paternal Grandmother    • Arthritis Maternal Grandmother        Social History     Socioeconomic History   • Marital status: Unknown   Tobacco Use   • Smoking status: Former   • Smokeless tobacco: Never   • Tobacco comments:     smoked less than 1 pack per day for 5 years   Substance and Sexual Activity   • Alcohol use: No   • Drug use: No   • Sexual activity: Defer       Current Outpatient Medications on File Prior to Visit   Medication Sig Dispense Refill   • amitriptyline (ELAVIL) 10 MG tablet TAKE 1 TABLET BY MOUTH AT BEDTIME 90 tablet 2   • aspirin 81 MG tablet Take by mouth daily.     • atorvastatin (LIPITOR) 40 MG tablet TAKE 1 TABLET BY MOUTH EVERY DAY 90 tablet 0   • buPROPion XL (WELLBUTRIN XL) 150 MG 24 hr tablet Take 1 tablet by mouth Daily. 90 tablet 3   • buPROPion XL (WELLBUTRIN XL) 300 MG 24 hr tablet Take 1 tablet by mouth Daily. 90 tablet 3   • cetirizine (ZyrTEC) 10 MG tablet Take 1 tablet by mouth daily.     • Cholecalciferol (VITAMIN D-3 PO) Take 1 capsule by mouth daily.     • citalopram (CeleXA) 40 MG tablet TAKE 1 TABLET BY MOUTH EVERY DAY 90 tablet 0   • clobetasol (TEMOVATE) 0.05 % ointment Apply  topically to the appropriate area as directed 2 (Two) Times a Day.     • Cyanocobalamin (VITAMIN B12 PO) Take 1 tablet by mouth daily.     • Folic Acid-Vit B6-Vit B12 (FA-VITAMIN B-6-VITAMIN B-12) 2.2-25-0.5 MG tablet Take  by mouth.     • losartan (COZAAR) 100 MG tablet Take 1 tablet by mouth Daily. 90 tablet 1   • meloxicam (MOBIC) 15 MG tablet TAKE 1 TABLET BY MOUTH EVERY DAY 90 tablet 0   • montelukast (SINGULAIR) 10 MG tablet TAKE 1 TABLET BY MOUTH EVERY DAY 90 tablet 0   • MULTIPLE VITAMINS-MINERALS-FA PO Take  by mouth.     • multivitamin (THERAGRAN) tablet tablet Take 1 tablet by mouth daily.     • pantoprazole (PROTONIX) 40 MG EC tablet Take 1 tablet by mouth Daily. 90 tablet 2   • Pyridoxine HCl (VITAMIN B-6 PO) Take by mouth.     • terbinafine (lamiSIL) 250 MG  tablet TAKE 1 TABLET BY MOUTH EVERY DAY 30 tablet 0     No current facility-administered medications on file prior to visit.       Review of Systems   Constitutional: Negative for chills, fever and unexpected weight loss.   HENT: Positive for rhinorrhea and sore throat. Negative for ear pain and postnasal drip.    Respiratory: Positive for cough, shortness of breath and wheezing.    Cardiovascular: Negative for chest pain.   Musculoskeletal: Positive for myalgias.   Skin: Negative for rash.       Recent Results (from the past 4704 hour(s))   CBC & Differential    Collection Time: 10/20/22  4:40 PM    Specimen: Blood   Result Value Ref Range    WBC 6.59 3.40 - 10.80 10*3/mm3    RBC 3.83 3.77 - 5.28 10*6/mm3    Hemoglobin 13.3 12.0 - 15.9 g/dL    Hematocrit 38.4 34.0 - 46.6 %    .3 (H) 79.0 - 97.0 fL    MCH 34.7 (H) 26.6 - 33.0 pg    MCHC 34.6 31.5 - 35.7 g/dL    RDW 12.5 12.3 - 15.4 %    Platelets 290 140 - 450 10*3/mm3    Neutrophil Rel % 61.4 42.7 - 76.0 %    Lymphocyte Rel % 28.1 19.6 - 45.3 %    Monocyte Rel % 9.3 5.0 - 12.0 %    Eosinophil Rel % 0.5 0.3 - 6.2 %    Basophil Rel % 0.5 0.0 - 1.5 %    Neutrophils Absolute 4.06 1.70 - 7.00 10*3/mm3    Lymphocytes Absolute 1.85 0.70 - 3.10 10*3/mm3    Monocytes Absolute 0.61 0.10 - 0.90 10*3/mm3    Eosinophils Absolute 0.03 0.00 - 0.40 10*3/mm3    Basophils Absolute 0.03 0.00 - 0.20 10*3/mm3    Immature Granulocyte Rel % 0.2 0.0 - 0.5 %    Immature Grans Absolute 0.01 0.00 - 0.05 10*3/mm3    nRBC 0.0 0.0 - 0.2 /100 WBC   Comprehensive Metabolic Panel    Collection Time: 10/20/22  4:40 PM    Specimen: Blood   Result Value Ref Range    Glucose 93 65 - 99 mg/dL    BUN 11 8 - 23 mg/dL    Creatinine 0.81 0.57 - 1.00 mg/dL    EGFR Result 75.3 >60.0 mL/min/1.73    BUN/Creatinine Ratio 13.6 7.0 - 25.0    Sodium 142 136 - 145 mmol/L    Potassium 5.2 3.5 - 5.2 mmol/L    Chloride 101 98 - 107 mmol/L    Total CO2 29.3 (H) 22.0 - 29.0 mmol/L    Calcium 10.2 8.6 - 10.5 mg/dL  "   Total Protein 7.7 6.0 - 8.5 g/dL    Albumin 4.50 3.50 - 5.20 g/dL    Globulin 3.2 gm/dL    A/G Ratio 1.4 g/dL    Total Bilirubin 0.2 0.0 - 1.2 mg/dL    Alkaline Phosphatase 73 39 - 117 U/L    AST (SGOT) 29 1 - 32 U/L    ALT (SGPT) 22 1 - 33 U/L   TSH    Collection Time: 10/20/22  4:40 PM    Specimen: Blood   Result Value Ref Range    TSH 0.334 0.270 - 4.200 uIU/mL   Vitamin D,25-Hydroxy    Collection Time: 10/20/22  4:40 PM    Specimen: Blood   Result Value Ref Range    25 Hydroxy, Vitamin D 36.5 30.0 - 100.0 ng/ml   Vitamin B12 & Folate    Collection Time: 10/20/22  4:40 PM    Specimen: Blood   Result Value Ref Range    Vitamin B-12 1,546 (H) 211 - 946 pg/mL    Folate >20.00 4.78 - 24.20 ng/mL   Lipid Panel    Collection Time: 10/20/22  4:40 PM    Specimen: Blood   Result Value Ref Range    Total Cholesterol 189 0 - 200 mg/dL    Triglycerides 216 (H) 0 - 150 mg/dL    HDL Cholesterol 59 40 - 60 mg/dL    VLDL Cholesterol Merlin 36 5 - 40 mg/dL    LDL Chol Calc (NIH) 94 0 - 100 mg/dL     Objective   Vitals:    10/20/22 1510   BP: 137/81   BP Location: Right arm   Patient Position: Sitting   Cuff Size: Adult   Pulse: 91   Temp: 95.3 °F (35.2 °C)   SpO2: 94%   Weight: 97.1 kg (214 lb)   Height: 152.4 cm (60\")     Body mass index is 41.79 kg/m².  Physical Exam  Vitals and nursing note reviewed.   Constitutional:       General: She is not in acute distress.     Appearance: She is well-developed. She is not diaphoretic.   HENT:      Right Ear: Tympanic membrane normal.      Left Ear: Tympanic membrane normal.      Nose: Nose normal.      Mouth/Throat:      Mouth: Mucous membranes are moist.   Eyes:      Pupils: Pupils are equal, round, and reactive to light.   Cardiovascular:      Rate and Rhythm: Normal rate and regular rhythm.   Pulmonary:      Effort: Pulmonary effort is normal. No respiratory distress.      Breath sounds: No wheezing.      Comments: Bilaterally diminished breath sounds          Diagnoses and all orders " for this visit:    1. Chronic cough (Primary)  Comments:  Persistent, had COVID January 2022 and believes she has long-haul COVID  Orders:  -     XR Chest PA & Lateral (In Office)    2. Dysphagia, unspecified type  -     Ambulatory Referral to Gastroenterology    3. Dyslipidemia    4. Other hyperlipidemia  -     Lipid Panel    5. Essential hypertension  -     Comprehensive Metabolic Panel  -     Lipid Panel    6. Vitamin D deficiency  -     Vitamin D,25-Hydroxy    7. Chronic fatigue  -     CBC & Differential  -     Comprehensive Metabolic Panel  -     TSH  -     Vitamin D,25-Hydroxy  -     Vitamin B12 & Folate    8. Postmenopausal    9. Shortness of breath  -     Adult Transthoracic Echo Complete W/ Cont if Necessary Per Protocol; Future    10. Morbid obesity (HCC)    Will call and communicate with patient regarding all of her above diagnostic studies and labs.    Return in about 2 months (around 12/20/2022).            Answers for HPI/ROS submitted by the patient on 10/19/2022  What is the primary reason for your visit?: Cough

## 2022-10-21 PROBLEM — Z78.0 POSTMENOPAUSAL: Status: ACTIVE | Noted: 2022-10-21

## 2022-10-21 PROBLEM — E55.9 VITAMIN D DEFICIENCY: Status: ACTIVE | Noted: 2022-10-21

## 2022-10-21 PROBLEM — R05.3 CHRONIC COUGH: Status: ACTIVE | Noted: 2022-10-21

## 2022-10-21 PROBLEM — R06.02 SHORTNESS OF BREATH: Status: ACTIVE | Noted: 2022-10-21

## 2022-10-21 LAB
25(OH)D3+25(OH)D2 SERPL-MCNC: 36.5 NG/ML (ref 30–100)
ALBUMIN SERPL-MCNC: 4.5 G/DL (ref 3.5–5.2)
ALBUMIN/GLOB SERPL: 1.4 G/DL
ALP SERPL-CCNC: 73 U/L (ref 39–117)
ALT SERPL-CCNC: 22 U/L (ref 1–33)
AST SERPL-CCNC: 29 U/L (ref 1–32)
BASOPHILS # BLD AUTO: 0.03 10*3/MM3 (ref 0–0.2)
BASOPHILS NFR BLD AUTO: 0.5 % (ref 0–1.5)
BILIRUB SERPL-MCNC: 0.2 MG/DL (ref 0–1.2)
BUN SERPL-MCNC: 11 MG/DL (ref 8–23)
BUN/CREAT SERPL: 13.6 (ref 7–25)
CALCIUM SERPL-MCNC: 10.2 MG/DL (ref 8.6–10.5)
CHLORIDE SERPL-SCNC: 101 MMOL/L (ref 98–107)
CHOLEST SERPL-MCNC: 189 MG/DL (ref 0–200)
CO2 SERPL-SCNC: 29.3 MMOL/L (ref 22–29)
CREAT SERPL-MCNC: 0.81 MG/DL (ref 0.57–1)
EGFRCR SERPLBLD CKD-EPI 2021: 75.3 ML/MIN/1.73
EOSINOPHIL # BLD AUTO: 0.03 10*3/MM3 (ref 0–0.4)
EOSINOPHIL NFR BLD AUTO: 0.5 % (ref 0.3–6.2)
ERYTHROCYTE [DISTWIDTH] IN BLOOD BY AUTOMATED COUNT: 12.5 % (ref 12.3–15.4)
FOLATE SERPL-MCNC: >20 NG/ML (ref 4.78–24.2)
GLOBULIN SER CALC-MCNC: 3.2 GM/DL
GLUCOSE SERPL-MCNC: 93 MG/DL (ref 65–99)
HCT VFR BLD AUTO: 38.4 % (ref 34–46.6)
HDLC SERPL-MCNC: 59 MG/DL (ref 40–60)
HGB BLD-MCNC: 13.3 G/DL (ref 12–15.9)
IMM GRANULOCYTES # BLD AUTO: 0.01 10*3/MM3 (ref 0–0.05)
IMM GRANULOCYTES NFR BLD AUTO: 0.2 % (ref 0–0.5)
LDLC SERPL CALC-MCNC: 94 MG/DL (ref 0–100)
LYMPHOCYTES # BLD AUTO: 1.85 10*3/MM3 (ref 0.7–3.1)
LYMPHOCYTES NFR BLD AUTO: 28.1 % (ref 19.6–45.3)
MCH RBC QN AUTO: 34.7 PG (ref 26.6–33)
MCHC RBC AUTO-ENTMCNC: 34.6 G/DL (ref 31.5–35.7)
MCV RBC AUTO: 100.3 FL (ref 79–97)
MONOCYTES # BLD AUTO: 0.61 10*3/MM3 (ref 0.1–0.9)
MONOCYTES NFR BLD AUTO: 9.3 % (ref 5–12)
NEUTROPHILS # BLD AUTO: 4.06 10*3/MM3 (ref 1.7–7)
NEUTROPHILS NFR BLD AUTO: 61.4 % (ref 42.7–76)
NRBC BLD AUTO-RTO: 0 /100 WBC (ref 0–0.2)
PLATELET # BLD AUTO: 290 10*3/MM3 (ref 140–450)
POTASSIUM SERPL-SCNC: 5.2 MMOL/L (ref 3.5–5.2)
PROT SERPL-MCNC: 7.7 G/DL (ref 6–8.5)
RBC # BLD AUTO: 3.83 10*6/MM3 (ref 3.77–5.28)
SODIUM SERPL-SCNC: 142 MMOL/L (ref 136–145)
TRIGL SERPL-MCNC: 216 MG/DL (ref 0–150)
TSH SERPL DL<=0.005 MIU/L-ACNC: 0.33 UIU/ML (ref 0.27–4.2)
VIT B12 SERPL-MCNC: 1546 PG/ML (ref 211–946)
VLDLC SERPL CALC-MCNC: 36 MG/DL (ref 5–40)
WBC # BLD AUTO: 6.59 10*3/MM3 (ref 3.4–10.8)

## 2022-10-31 DIAGNOSIS — M19.90 ARTHRITIS: ICD-10-CM

## 2022-11-01 RX ORDER — MELOXICAM 15 MG/1
TABLET ORAL
Qty: 90 TABLET | Refills: 0 | Status: SHIPPED | OUTPATIENT
Start: 2022-11-01 | End: 2023-03-27

## 2022-11-09 ENCOUNTER — HOSPITAL ENCOUNTER (OUTPATIENT)
Dept: CARDIOLOGY | Facility: HOSPITAL | Age: 77
Discharge: HOME | End: 2022-11-09
Admitting: FAMILY MEDICINE

## 2022-11-09 VITALS
WEIGHT: 214 LBS | DIASTOLIC BLOOD PRESSURE: 78 MMHG | BODY MASS INDEX: 42.01 KG/M2 | HEIGHT: 60 IN | SYSTOLIC BLOOD PRESSURE: 140 MMHG | OXYGEN SATURATION: 95 % | HEART RATE: 92 BPM

## 2022-11-09 DIAGNOSIS — R06.02 SHORTNESS OF BREATH: ICD-10-CM

## 2022-11-09 LAB
AORTIC ARCH: 2.6 CM
ASCENDING AORTA: 3.7 CM
BH CV ECHO MEAS - ACS: 2.48 CM
BH CV ECHO MEAS - AO MAX PG: 14 MMHG
BH CV ECHO MEAS - AO MEAN PG: 7.1 MMHG
BH CV ECHO MEAS - AO ROOT DIAM: 3.4 CM
BH CV ECHO MEAS - AO V2 MAX: 187 CM/SEC
BH CV ECHO MEAS - AO V2 VTI: 36.8 CM
BH CV ECHO MEAS - AVA(I,D): 1.99 CM2
BH CV ECHO MEAS - EDV(CUBED): 90.7 ML
BH CV ECHO MEAS - EDV(MOD-SP2): 69 ML
BH CV ECHO MEAS - EDV(MOD-SP4): 73 ML
BH CV ECHO MEAS - EF(MOD-BP): 65.2 %
BH CV ECHO MEAS - EF(MOD-SP2): 60.9 %
BH CV ECHO MEAS - EF(MOD-SP4): 72.6 %
BH CV ECHO MEAS - ESV(CUBED): 20 ML
BH CV ECHO MEAS - ESV(MOD-SP2): 27 ML
BH CV ECHO MEAS - ESV(MOD-SP4): 20 ML
BH CV ECHO MEAS - FS: 39.6 %
BH CV ECHO MEAS - IVS/LVPW: 1.07 CM
BH CV ECHO MEAS - IVSD: 1.24 CM
BH CV ECHO MEAS - LAT PEAK E' VEL: 10.1 CM/SEC
BH CV ECHO MEAS - LV DIASTOLIC VOL/BSA (35-75): 38 CM2
BH CV ECHO MEAS - LV MASS(C)D: 198.6 GRAMS
BH CV ECHO MEAS - LV MAX PG: 7.5 MMHG
BH CV ECHO MEAS - LV MEAN PG: 4.8 MMHG
BH CV ECHO MEAS - LV SYSTOLIC VOL/BSA (12-30): 10.4 CM2
BH CV ECHO MEAS - LV V1 MAX: 137.1 CM/SEC
BH CV ECHO MEAS - LV V1 VTI: 29.3 CM
BH CV ECHO MEAS - LVIDD: 4.5 CM
BH CV ECHO MEAS - LVIDS: 2.7 CM
BH CV ECHO MEAS - LVOT AREA: 2.5 CM2
BH CV ECHO MEAS - LVOT DIAM: 1.78 CM
BH CV ECHO MEAS - LVPWD: 1.16 CM
BH CV ECHO MEAS - MED PEAK E' VEL: 9.5 CM/SEC
BH CV ECHO MEAS - MV A DUR: 0.1 SEC
BH CV ECHO MEAS - MV A MAX VEL: 138.8 CM/SEC
BH CV ECHO MEAS - MV DEC SLOPE: 487.3 CM/SEC2
BH CV ECHO MEAS - MV DEC TIME: 0.19 MSEC
BH CV ECHO MEAS - MV E MAX VEL: 99.2 CM/SEC
BH CV ECHO MEAS - MV E/A: 0.71
BH CV ECHO MEAS - MV MAX PG: 8.4 MMHG
BH CV ECHO MEAS - MV MEAN PG: 3.6 MMHG
BH CV ECHO MEAS - MV P1/2T: 55.5 MSEC
BH CV ECHO MEAS - MV V2 VTI: 34.8 CM
BH CV ECHO MEAS - MVA(P1/2T): 4 CM2
BH CV ECHO MEAS - MVA(VTI): 2.11 CM2
BH CV ECHO MEAS - PA ACC TIME: 0.08 SEC
BH CV ECHO MEAS - PA PR(ACCEL): 44.5 MMHG
BH CV ECHO MEAS - PA V2 MAX: 118.1 CM/SEC
BH CV ECHO MEAS - PULM A REVS DUR: 0.1 SEC
BH CV ECHO MEAS - PULM A REVS VEL: 37.6 CM/SEC
BH CV ECHO MEAS - PULM DIAS VEL: 43.6 CM/SEC
BH CV ECHO MEAS - PULM S/D: 1.15
BH CV ECHO MEAS - PULM SYS VEL: 50 CM/SEC
BH CV ECHO MEAS - QP/QS: 1.31
BH CV ECHO MEAS - RAP SYSTOLE: 3 MMHG
BH CV ECHO MEAS - RV MAX PG: 3.8 MMHG
BH CV ECHO MEAS - RV V1 MAX: 97.3 CM/SEC
BH CV ECHO MEAS - RV V1 VTI: 19.7 CM
BH CV ECHO MEAS - RVOT DIAM: 2.49 CM
BH CV ECHO MEAS - RVSP: 14.2 MMHG
BH CV ECHO MEAS - SI(MOD-SP2): 21.9 ML/M2
BH CV ECHO MEAS - SI(MOD-SP4): 27.6 ML/M2
BH CV ECHO MEAS - SUP REN AO DIAM: 1.6 CM
BH CV ECHO MEAS - SV(LVOT): 73.2 ML
BH CV ECHO MEAS - SV(MOD-SP2): 42 ML
BH CV ECHO MEAS - SV(MOD-SP4): 53 ML
BH CV ECHO MEAS - SV(RVOT): 96.3 ML
BH CV ECHO MEAS - TAPSE (>1.6): 2.8 CM
BH CV ECHO MEAS - TR MAX PG: 11.2 MMHG
BH CV ECHO MEAS - TR MAX VEL: 167.4 CM/SEC
BH CV ECHO MEASUREMENTS AVERAGE E/E' RATIO: 10.12
BH CV XLRA - RV BASE: 3 CM
BH CV XLRA - RV LENGTH: 7.9 CM
BH CV XLRA - RV MID: 2.9 CM
BH CV XLRA - TDI S': 16.8 CM/SEC
LEFT ATRIUM VOLUME INDEX: 20.1 ML/M2
MAXIMAL PREDICTED HEART RATE: 144 BPM
SINUS: 3.4 CM
STJ: 3.3 CM
STRESS TARGET HR: 122 BPM

## 2022-11-09 PROCEDURE — 93306 TTE W/DOPPLER COMPLETE: CPT | Performed by: INTERNAL MEDICINE

## 2022-11-09 PROCEDURE — 25010000002 PERFLUTREN (DEFINITY) 8.476 MG IN SODIUM CHLORIDE (PF) 0.9 % 10 ML INJECTION: Performed by: FAMILY MEDICINE

## 2022-11-09 PROCEDURE — 93306 TTE W/DOPPLER COMPLETE: CPT

## 2022-11-09 RX ADMIN — PERFLUTREN 1.5 ML: 6.52 INJECTION, SUSPENSION INTRAVENOUS at 13:12

## 2022-11-25 DIAGNOSIS — T78.40XD ALLERGY, SUBSEQUENT ENCOUNTER: ICD-10-CM

## 2022-11-26 RX ORDER — MONTELUKAST SODIUM 10 MG/1
TABLET ORAL
Qty: 90 TABLET | Refills: 0 | Status: SHIPPED | OUTPATIENT
Start: 2022-11-26

## 2022-11-26 NOTE — TELEPHONE ENCOUNTER
Rx Refill Note  Requested Prescriptions     Pending Prescriptions Disp Refills   • montelukast (SINGULAIR) 10 MG tablet [Pharmacy Med Name: Montelukast Sodium Oral Tablet 10 MG] 90 tablet 0     Sig: TAKE 1 TABLET BY MOUTH EVERY DAY      Last office visit with prescribing clinician: 10/20/2022      Next office visit with prescribing clinician: 12/20/2022            Eliud Carrero, KALYANI/LMR  11/26/22, 09:37 EST

## 2022-12-20 ENCOUNTER — OFFICE VISIT (OUTPATIENT)
Dept: FAMILY MEDICINE CLINIC | Facility: CLINIC | Age: 77
End: 2022-12-20

## 2022-12-20 VITALS
DIASTOLIC BLOOD PRESSURE: 86 MMHG | WEIGHT: 215 LBS | RESPIRATION RATE: 18 BRPM | BODY MASS INDEX: 40.59 KG/M2 | HEART RATE: 109 BPM | HEIGHT: 61 IN | TEMPERATURE: 96.8 F | OXYGEN SATURATION: 95 % | SYSTOLIC BLOOD PRESSURE: 148 MMHG

## 2022-12-20 DIAGNOSIS — B36.9 SUPERFICIAL FUNGUS INFECTION OF SKIN: ICD-10-CM

## 2022-12-20 DIAGNOSIS — R05.3 COVID-19 LONG HAULER MANIFESTING CHRONIC COUGH: ICD-10-CM

## 2022-12-20 DIAGNOSIS — U09.9 COVID-19 LONG HAULER MANIFESTING CHRONIC COUGH: ICD-10-CM

## 2022-12-20 DIAGNOSIS — G43.709 CHRONIC MIGRAINE WITHOUT AURA WITHOUT STATUS MIGRAINOSUS, NOT INTRACTABLE: ICD-10-CM

## 2022-12-20 DIAGNOSIS — R05.3 PERSISTENT COUGH: ICD-10-CM

## 2022-12-20 DIAGNOSIS — R73.9 HYPERGLYCEMIA: ICD-10-CM

## 2022-12-20 DIAGNOSIS — Z00.00 MEDICARE ANNUAL WELLNESS VISIT, SUBSEQUENT: Primary | ICD-10-CM

## 2022-12-20 DIAGNOSIS — E78.1 HYPERTRIGLYCERIDEMIA: ICD-10-CM

## 2022-12-20 PROCEDURE — 1159F MED LIST DOCD IN RCRD: CPT | Performed by: FAMILY MEDICINE

## 2022-12-20 PROCEDURE — 1170F FXNL STATUS ASSESSED: CPT | Performed by: FAMILY MEDICINE

## 2022-12-20 PROCEDURE — G0439 PPPS, SUBSEQ VISIT: HCPCS | Performed by: FAMILY MEDICINE

## 2022-12-20 RX ORDER — THERMOMETER, ELECTRONIC,ORAL
1 EACH MISCELLANEOUS 2 TIMES DAILY
Qty: 113 G | Refills: 6 | Status: SHIPPED | OUTPATIENT
Start: 2022-12-20

## 2022-12-20 RX ORDER — AMITRIPTYLINE HYDROCHLORIDE 10 MG/1
10 TABLET, FILM COATED ORAL
Qty: 90 TABLET | Refills: 3 | Status: SHIPPED | OUTPATIENT
Start: 2022-12-20

## 2022-12-20 NOTE — PROGRESS NOTES
The ABCs of the Annual Wellness Visit  Subsequent Medicare Wellness Visit    Subjective    Bella Dukes is a 77 y.o. female who presents for a Subsequent Medicare Wellness Visit.    The following portions of the patient's history were reviewed and   updated as appropriate: allergies, current medications, past family history, past medical history, past social history, past surgical history and problem list.    Compared to one year ago, the patient feels her physical   health is the same.    Compared to one year ago, the patient feels her mental   health is the same.    Recent Hospitalizations:  She was not admitted to the hospital during the last year.       Current Medical Providers:  Patient Care Team:  Katina Montez MD as PCP - General (Family Medicine)    Outpatient Medications Prior to Visit   Medication Sig Dispense Refill   • aspirin 81 MG tablet Take by mouth daily.     • atorvastatin (LIPITOR) 40 MG tablet TAKE 1 TABLET BY MOUTH EVERY DAY 90 tablet 0   • buPROPion XL (WELLBUTRIN XL) 150 MG 24 hr tablet Take 1 tablet by mouth Daily. 90 tablet 3   • buPROPion XL (WELLBUTRIN XL) 300 MG 24 hr tablet Take 1 tablet by mouth Daily. 90 tablet 3   • cetirizine (ZyrTEC) 10 MG tablet Take 1 tablet by mouth daily.     • Cholecalciferol (VITAMIN D-3 PO) Take 1 capsule by mouth daily.     • citalopram (CeleXA) 40 MG tablet TAKE 1 TABLET BY MOUTH EVERY DAY 90 tablet 0   • clobetasol (TEMOVATE) 0.05 % ointment Apply  topically to the appropriate area as directed 2 (Two) Times a Day.     • Cyanocobalamin (VITAMIN B12 PO) Take 1 tablet by mouth daily.     • Folic Acid-Vit B6-Vit B12 (FA-VITAMIN B-6-VITAMIN B-12) 2.2-25-0.5 MG tablet Take  by mouth.     • losartan (COZAAR) 100 MG tablet Take 1 tablet by mouth Daily. 90 tablet 1   • meloxicam (MOBIC) 15 MG tablet TAKE 1 TABLET BY MOUTH EVERY DAY 90 tablet 0   • montelukast (SINGULAIR) 10 MG tablet TAKE 1 TABLET BY MOUTH EVERY DAY 90 tablet 0   • MULTIPLE  VITAMINS-MINERALS-FA PO Take  by mouth.     • multivitamin (THERAGRAN) tablet tablet Take 1 tablet by mouth daily.     • pantoprazole (PROTONIX) 40 MG EC tablet Take 1 tablet by mouth Daily. 90 tablet 2   • Pyridoxine HCl (VITAMIN B-6 PO) Take by mouth.     • terbinafine (lamiSIL) 250 MG tablet TAKE 1 TABLET BY MOUTH EVERY DAY 30 tablet 0   • amitriptyline (ELAVIL) 10 MG tablet TAKE 1 TABLET BY MOUTH AT BEDTIME 90 tablet 2     No facility-administered medications prior to visit.       No opioid medication identified on active medication list. I have reviewed chart for other potential  high risk medication/s and harmful drug interactions in the elderly.          Aspirin is on active medication list. Aspirin use is indicated based on review of current medical condition/s. Pros and cons of this therapy have been discussed today. Benefits of this medication outweigh potential harm.  Patient has been encouraged to continue taking this medication.  .      Patient Active Problem List   Diagnosis   • Dilatation of aorta (HCC)   • Mild intermittent asthma without complication   • Constipation   • Depression   • Gastroesophageal reflux disease without esophagitis   • Dyslipidemia   • Essential hypertension   • Low back pain   • Pericardial effusion   • Morbid obesity (HCC)   • Thoracic back pain   • Dysphagia   • Fatigue   • Sleep apnea   • Medicare annual wellness visit, subsequent   • Acute non-recurrent maxillary sinusitis   • Arm pain, anterior, right   • Knee pain   • Choking   • Nasal congestion   • Cough present for greater than 3 weeks   • PND (post-nasal drip)   • Asthma   • Arthritis   • Allergies   • Chronic migraine without aura without status migrainosus, not intractable   • Current moderate episode of major depressive disorder without prior episode ([facility])   • Nail fungus   • Diarrhea   • Chronic cough   • Postmenopausal   • Shortness of breath   • Vitamin D deficiency     Advance Care Planning  Advance Directive  "is not on file.  ACP discussion was held with the patient during this visit. Patient has an advance directive (not in EMR), copy requested.     Objective    Vitals:    12/20/22 1310   BP: 148/86   BP Location: Left arm   Patient Position: Sitting   Cuff Size: Large Adult   Pulse: 109   Resp: 18   Temp: 96.8 °F (36 °C)   TempSrc: Temporal   SpO2: 95%   Weight: 97.5 kg (215 lb)   Height: 153.7 cm (60.5\")     Estimated body mass index is 41.3 kg/m² as calculated from the following:    Height as of this encounter: 153.7 cm (60.5\").    Weight as of this encounter: 97.5 kg (215 lb).    Class 3 Severe Obesity (BMI >=40). Obesity-related health conditions include the following: hypertension. Obesity is improving with treatment. BMI is is above average; BMI management plan is completed. We discussed portion control and increasing exercise.      Does the patient have evidence of cognitive impairment?   No    Lab Results   Component Value Date    CHLPL 189 10/20/2022    TRIG 216 (H) 10/20/2022    HDL 59 10/20/2022    LDL 94 10/20/2022    VLDL 36 10/20/2022          HEALTH RISK ASSESSMENT    Smoking Status:  Social History     Tobacco Use   Smoking Status Former   Smokeless Tobacco Never   Tobacco Comments    smoked less than 1 pack per day for 5 years     Alcohol Consumption:  Social History     Substance and Sexual Activity   Alcohol Use No     Fall Risk Screen:    STEADI Fall Risk Assessment was completed, and patient is at HIGH risk for falls. Assessment completed on:12/20/2022    Depression Screening:  PHQ-2/PHQ-9 Depression Screening 12/20/2022   Retired PHQ-9 Total Score -   Retired Total Score -   Little Interest or Pleasure in Doing Things 0-->not at all   Feeling Down, Depressed or Hopeless 1-->several days   PHQ-9: Brief Depression Severity Measure Score 1       Health Habits and Functional and Cognitive Screening:  Functional & Cognitive Status 12/20/2022   Do you have difficulty preparing food and eating? No   Do " you have difficulty bathing yourself, getting dressed or grooming yourself? No   Do you have difficulty using the toilet? No   Do you have difficulty moving around from place to place? No   Do you have trouble with steps or getting out of a bed or a chair? No   Current Diet Unhealthy Diet   Dental Exam Up to date   Eye Exam Up to date   Exercise (times per week) 0 times per week        Exercise Frequency Comment -   Current Exercises Include No Regular Exercise   Current Exercise Activities Include -   Do you need help using the phone?  No   Are you deaf or do you have serious difficulty hearing?  Yes   Do you need help with transportation? Yes   Do you need help shopping? No   Do you need help preparing meals?  No   Do you need help with housework?  No   Do you need help with laundry? No   Do you need help taking your medications? No   Do you need help managing money? No   Do you ever drive or ride in a car without wearing a seat belt? No   Have you felt unusual stress, anger or loneliness in the last month? Yes   Who do you live with? Spouse   If you need help, do you have trouble finding someone available to you? No   Have you been bothered in the last four weeks by sexual problems? -   Do you have difficulty concentrating, remembering or making decisions? No       Age-appropriate Screening Schedule:  Refer to the list below for future screening recommendations based on patient's age, sex and/or medical conditions. Orders for these recommended tests are listed in the plan section. The patient has been provided with a written plan.    Health Maintenance   Topic Date Due   • MAMMOGRAM  Never done   • DXA SCAN  Never done   • TDAP/TD VACCINES (1 - Tdap) Never done   • ZOSTER VACCINE (2 of 2) 12/09/2020   • LIPID PANEL  10/20/2023   • INFLUENZA VACCINE  Completed                CMS Preventative Services Quick Reference  Risk Factors Identified During Encounter:    Fall Risk-High or Moderate: Discussed Fall Prevention  in the home    The above risks/problems have been discussed with the patient.  Pertinent information has been shared with the patient in the After Visit Summary.    Diagnoses and all orders for this visit:    1. Medicare annual wellness visit, subsequent (Primary)    2. Superficial fungus infection of skin  -     tolnaftate (Tinactin) 1 % cream; Apply 1 application topically to the appropriate area as directed 2 (Two) Times a Day.  Dispense: 113 g; Refill: 6    3. Chronic migraine without aura without status migrainosus, not intractable  -     amitriptyline (ELAVIL) 10 MG tablet; Take 1 tablet by mouth every night at bedtime.  Dispense: 90 tablet; Refill: 3    4. Hyperglycemia  -     Hemoglobin A1c    5. Hypertriglyceridemia  -     Hemoglobin A1c    6. Persistent cough  -     CT Chest With Contrast; Future    7. COVID-19 long hauler manifesting chronic cough  -     CT Chest With Contrast; Future        Follow Up:   Next Medicare Wellness visit to be scheduled in 1 year.      An After Visit Summary and PPPS were made available to the patient.

## 2022-12-21 LAB — HBA1C MFR BLD: 5.8 % (ref 4.8–5.6)

## 2023-01-03 ENCOUNTER — OFFICE VISIT (OUTPATIENT)
Dept: FAMILY MEDICINE CLINIC | Facility: CLINIC | Age: 78
End: 2023-01-03
Payer: MEDICARE

## 2023-01-03 VITALS
TEMPERATURE: 97.5 F | HEIGHT: 61 IN | WEIGHT: 213.8 LBS | BODY MASS INDEX: 40.37 KG/M2 | HEART RATE: 99 BPM | RESPIRATION RATE: 20 BRPM | OXYGEN SATURATION: 90 % | DIASTOLIC BLOOD PRESSURE: 86 MMHG | SYSTOLIC BLOOD PRESSURE: 144 MMHG

## 2023-01-03 DIAGNOSIS — R05.3 CHRONIC COUGH: Primary | ICD-10-CM

## 2023-01-03 DIAGNOSIS — J45.20 MILD INTERMITTENT ASTHMA WITHOUT COMPLICATION: ICD-10-CM

## 2023-01-03 PROCEDURE — 1159F MED LIST DOCD IN RCRD: CPT | Performed by: FAMILY MEDICINE

## 2023-01-03 PROCEDURE — 1160F RVW MEDS BY RX/DR IN RCRD: CPT | Performed by: FAMILY MEDICINE

## 2023-01-03 PROCEDURE — 3079F DIAST BP 80-89 MM HG: CPT | Performed by: FAMILY MEDICINE

## 2023-01-03 PROCEDURE — 99214 OFFICE O/P EST MOD 30 MIN: CPT | Performed by: FAMILY MEDICINE

## 2023-01-03 PROCEDURE — 3077F SYST BP >= 140 MM HG: CPT | Performed by: FAMILY MEDICINE

## 2023-01-03 RX ORDER — ALBUTEROL SULFATE 90 UG/1
2 AEROSOL, METERED RESPIRATORY (INHALATION) EVERY 4 HOURS PRN
Qty: 18 G | Refills: 3 | Status: SHIPPED | OUTPATIENT
Start: 2023-01-03

## 2023-01-03 NOTE — PROGRESS NOTES
Answers for HPI/ROS submitted by the patient on 1/2/2023  What is the primary reason for your visit?: Cough    Chief Complaint  Cough    Subjective        Bella Dukes presents to [facility]  History of Present Illness  Pt was dx with Covid about a year ago and since then she has been having a cough that has not gone away.  She was taking mucinex that was helping some but not anymore.  She is still taking singulair.  She thinks she was dx with asthma in the past.    Normal cxr in October and has a CT scan coming up of her chest in about 10 days.    Objective   Vital Signs:  /86 (BP Location: Right arm, Patient Position: Sitting, Cuff Size: Large Adult)   Pulse 99   Temp 97.5 °F (36.4 °C) (Temporal)   Resp 20   Ht 153.7 cm (60.5\")   Wt 97 kg (213 lb 12.8 oz)   SpO2 90%   BMI 41.07 kg/m²   Estimated body mass index is 41.07 kg/m² as calculated from the following:    Height as of this encounter: 153.7 cm (60.5\").    Weight as of this encounter: 97 kg (213 lb 12.8 oz).    Class 3 Severe Obesity (BMI >=40). Obesity-related health conditions include the following: dyslipidemias. Obesity is improving with lifestyle modifications. BMI is is above average; BMI management plan is completed. We discussed portion control and increasing exercise.      Physical Exam  Vitals and nursing note reviewed.   Constitutional:       Appearance: Normal appearance. She is well-developed.   HENT:      Head: Normocephalic and atraumatic.      Mouth/Throat:      Mouth: Mucous membranes are moist.      Pharynx: Oropharynx is clear. No oropharyngeal exudate or posterior oropharyngeal erythema.   Eyes:      General: No scleral icterus.        Right eye: No discharge.         Left eye: No discharge.      Extraocular Movements: Extraocular movements intact.      Conjunctiva/sclera: Conjunctivae normal.      Pupils: Pupils are equal, round, and reactive to light.   Cardiovascular:      Rate and Rhythm:  Normal rate and regular rhythm.      Heart sounds: Normal heart sounds. No murmur heard.  Pulmonary:      Effort: Pulmonary effort is normal. No respiratory distress.      Breath sounds: No stridor. Wheezing present. No rhonchi.   Neurological:      General: No focal deficit present.      Mental Status: She is alert and oriented to person, place, and time. She is not disoriented.   Psychiatric:         Mood and Affect: Mood normal.         Behavior: Behavior normal.        Result Review :                Assessment and Plan   Diagnoses and all orders for this visit:    1. Chronic cough (Primary)  -     albuterol sulfate  (90 Base) MCG/ACT inhaler; Inhale 2 puffs Every 4 (Four) Hours As Needed for Wheezing.  Dispense: 18 g; Refill: 3    2. Mild intermittent asthma without complication  -     albuterol sulfate  (90 Base) MCG/ACT inhaler; Inhale 2 puffs Every 4 (Four) Hours As Needed for Wheezing.  Dispense: 18 g; Refill: 3             Follow Up   No follow-ups on file.  Patient was given instructions and counseling regarding her condition or for health maintenance advice. Please see specific information pulled into the AVS if appropriate.     Symptomatic treatment and otc meds and fluids and rest.  Follow up if no better.    Start albuterol;  Will get CT chest next week.    Consider pulm if no better to help determine cause of cough.    I have reviewed records and cxr.

## 2023-01-13 ENCOUNTER — HOSPITAL ENCOUNTER (OUTPATIENT)
Dept: CT IMAGING | Facility: HOSPITAL | Age: 78
Discharge: HOME | End: 2023-01-13
Admitting: FAMILY MEDICINE
Payer: MEDICARE

## 2023-01-13 DIAGNOSIS — U09.9 COVID-19 LONG HAULER MANIFESTING CHRONIC COUGH: ICD-10-CM

## 2023-01-13 DIAGNOSIS — R05.3 PERSISTENT COUGH: ICD-10-CM

## 2023-01-13 DIAGNOSIS — R05.3 COVID-19 LONG HAULER MANIFESTING CHRONIC COUGH: ICD-10-CM

## 2023-01-13 LAB — CREAT BLDA-MCNC: 1 MG/DL (ref 0.6–1.3)

## 2023-01-13 PROCEDURE — 82565 ASSAY OF CREATININE: CPT

## 2023-01-13 PROCEDURE — 25010000002 IOPAMIDOL 61 % SOLUTION: Performed by: FAMILY MEDICINE

## 2023-01-13 PROCEDURE — 71260 CT THORAX DX C+: CPT

## 2023-01-13 RX ADMIN — IOPAMIDOL 85 ML: 612 INJECTION, SOLUTION INTRAVENOUS at 11:56

## 2023-01-17 DIAGNOSIS — I31.39 PERICARDIAL EFFUSION: ICD-10-CM

## 2023-01-17 DIAGNOSIS — R93.89 ABNORMAL CT OF THE CHEST: ICD-10-CM

## 2023-01-17 DIAGNOSIS — R05.3 CHRONIC COUGH: ICD-10-CM

## 2023-01-17 DIAGNOSIS — R06.02 SHORTNESS OF BREATH: Primary | ICD-10-CM

## 2023-01-17 RX ORDER — AMOXICILLIN AND CLAVULANATE POTASSIUM 875; 125 MG/1; MG/1
1 TABLET, FILM COATED ORAL 2 TIMES DAILY
Qty: 14 TABLET | Refills: 0 | Status: SHIPPED | OUTPATIENT
Start: 2023-01-17 | End: 2023-01-24

## 2023-01-25 DIAGNOSIS — E78.5 HYPERLIPIDEMIA, UNSPECIFIED HYPERLIPIDEMIA TYPE: ICD-10-CM

## 2023-01-25 RX ORDER — ATORVASTATIN CALCIUM 40 MG/1
40 TABLET, FILM COATED ORAL DAILY
Qty: 90 TABLET | Refills: 0 | Status: SHIPPED | OUTPATIENT
Start: 2023-01-25

## 2023-02-02 DIAGNOSIS — I10 ESSENTIAL HYPERTENSION: ICD-10-CM

## 2023-02-02 RX ORDER — AMOXICILLIN AND CLAVULANATE POTASSIUM 875; 125 MG/1; MG/1
TABLET, FILM COATED ORAL
COMMUNITY
Start: 2023-01-29 | End: 2023-02-06

## 2023-02-02 RX ORDER — LOSARTAN POTASSIUM 100 MG/1
100 TABLET ORAL DAILY
Qty: 90 TABLET | Refills: 2 | Status: SHIPPED | OUTPATIENT
Start: 2023-02-02

## 2023-02-02 NOTE — TELEPHONE ENCOUNTER
Rx Refill Note  Requested Prescriptions     Pending Prescriptions Disp Refills   • losartan (COZAAR) 100 MG tablet 90 tablet 1     Sig: Take 1 tablet by mouth Daily.      Last office visit with prescribing clinician: 1/3/2023   12/20/22 [facility]  Next office visit with prescribing clinician: Next Medicare Wellness visit to                                                                              be scheduled in 1 year.    Tenisha Jimenez MA  02/02/23, 09:25 EST

## 2023-02-11 NOTE — PROGRESS NOTES
"Chief Complaint  Cough    Subjective        Bella Dukes presents to [facility]  History of Present Illness  Pt is here follow up for uri, cough, congestion and sinus pain.  She still has cough and states she gets colored phlegm at times and then back to normal.          [facility] notes: 2/6/23  76 yo female with a history of asthma presents with cough and strange feeling in chest.  She had COVID about a year ago and it left her with a chronic cough.  She presents with complaint of emptiness in her chest and increasing coughing.  Denies any chest pain or pressure sensation.  Cough is productive of clear white mucus.  She recently had a CT scan of the chest on January 16 and it showed possible inflammatory process or interstitial fibrotic process.  Covid and flu negative. EKG shows no changes compared to one in Jan 2022. CXR shows no acute process (radiologist result pending).   Prescribed augmentin and Medrol DP.   FU with PCP end of week recommended.      Acute cough: complicated acute illness or injury  Chronic cough: chronic illness or injury with exacerbation, progression, or side effects of treatment  Amount and/or Complexity of Data Reviewed  External Data Reviewed: notes.     Details: CT scan report dated Jan 16, 2023.   Labs: ordered.     Details: Covid and flu negative.   Radiology: ordered and independent interpretation performed.     Details: CXR no acute changes.   ECG/medicine tests: ordered and independent interpretation performed.     Details: EKG shows no changes compared to Jan 2022.   Objective   Vital Signs:  /84 (BP Location: Right arm, Patient Position: Sitting, Cuff Size: Large Adult)   Pulse 107   Temp 98.4 °F (36.9 °C) (Temporal)   Resp 22   Ht 153.7 cm (60.5\")   Wt 96.8 kg (213 lb 6.4 oz)   SpO2 90%   BMI 40.99 kg/m²   Estimated body mass index is 40.99 kg/m² as calculated from the following:    Height as of this encounter: 153.7 cm (60.5\").    Weight " as of this encounter: 96.8 kg (213 lb 6.4 oz).       Class 3 Severe Obesity (BMI >=40). Obesity-related health conditions include the following: dyslipidemias. Obesity is improving with lifestyle modifications. BMI is is above average; BMI management plan is completed. We discussed portion control and increasing exercise.      Physical Exam  Vitals and nursing note reviewed.   Constitutional:       Appearance: Normal appearance. She is well-developed.   Cardiovascular:      Rate and Rhythm: Normal rate and regular rhythm.      Heart sounds: Normal heart sounds. No murmur heard.  Pulmonary:      Effort: Pulmonary effort is normal. No respiratory distress.      Breath sounds: Normal breath sounds. No stridor. No wheezing or rhonchi.   Neurological:      General: No focal deficit present.      Mental Status: She is alert and oriented to person, place, and time. She is not disoriented.   Psychiatric:         Mood and Affect: Mood normal.         Behavior: Behavior normal.        Result Review :                   Assessment and Plan   Diagnoses and all orders for this visit:    1. Cough present for greater than 3 weeks (Primary)  -     triamcinolone acetonide (KENALOG-40) injection 40 mg    2. Bronchitis  -     triamcinolone acetonide (KENALOG-40) injection 40 mg    3. Chronic cough  -     triamcinolone acetonide (KENALOG-40) injection 40 mg             Follow Up   No follow-ups on file.  Patient was given instructions and counseling regarding her condition or for health maintenance advice. Please see specific information pulled into the AVS if appropriate.     Symptomatic treatment and otc meds and fluids and rest.  Follow up if no better.    If no better, will set up with pulm.

## 2023-02-13 ENCOUNTER — OFFICE VISIT (OUTPATIENT)
Dept: FAMILY MEDICINE CLINIC | Facility: CLINIC | Age: 78
End: 2023-02-13
Payer: MEDICARE

## 2023-02-13 VITALS
TEMPERATURE: 98.4 F | SYSTOLIC BLOOD PRESSURE: 138 MMHG | OXYGEN SATURATION: 90 % | RESPIRATION RATE: 22 BRPM | HEIGHT: 61 IN | HEART RATE: 107 BPM | WEIGHT: 213.4 LBS | DIASTOLIC BLOOD PRESSURE: 84 MMHG | BODY MASS INDEX: 40.29 KG/M2

## 2023-02-13 DIAGNOSIS — J40 BRONCHITIS: ICD-10-CM

## 2023-02-13 DIAGNOSIS — R05.8 COUGH PRESENT FOR GREATER THAN 3 WEEKS: Primary | ICD-10-CM

## 2023-02-13 DIAGNOSIS — R05.3 CHRONIC COUGH: ICD-10-CM

## 2023-02-13 PROBLEM — R09.81 NASAL CONGESTION: Status: RESOLVED | Noted: 2019-08-01 | Resolved: 2023-02-13

## 2023-02-13 PROBLEM — J01.00 ACUTE NON-RECURRENT MAXILLARY SINUSITIS: Status: RESOLVED | Noted: 2017-01-05 | Resolved: 2023-02-13

## 2023-02-13 PROBLEM — R06.02 SHORTNESS OF BREATH: Status: RESOLVED | Noted: 2022-10-21 | Resolved: 2023-02-13

## 2023-02-13 PROBLEM — T17.308A CHOKING: Status: RESOLVED | Noted: 2019-08-01 | Resolved: 2023-02-13

## 2023-02-13 PROBLEM — R19.7 DIARRHEA: Status: RESOLVED | Noted: 2021-12-16 | Resolved: 2023-02-13

## 2023-02-13 PROCEDURE — 96372 THER/PROPH/DIAG INJ SC/IM: CPT | Performed by: FAMILY MEDICINE

## 2023-02-13 PROCEDURE — 99213 OFFICE O/P EST LOW 20 MIN: CPT | Performed by: FAMILY MEDICINE

## 2023-02-13 RX ORDER — TRIAMCINOLONE ACETONIDE 40 MG/ML
40 INJECTION, SUSPENSION INTRA-ARTICULAR; INTRAMUSCULAR ONCE
Status: COMPLETED | OUTPATIENT
Start: 2023-02-13 | End: 2023-02-13

## 2023-02-13 RX ADMIN — TRIAMCINOLONE ACETONIDE 40 MG: 40 INJECTION, SUSPENSION INTRA-ARTICULAR; INTRAMUSCULAR at 15:59

## 2023-03-26 DIAGNOSIS — M19.90 ARTHRITIS: ICD-10-CM

## 2023-03-26 DIAGNOSIS — F32.A DEPRESSION, UNSPECIFIED DEPRESSION TYPE: ICD-10-CM

## 2023-03-27 RX ORDER — CITALOPRAM 40 MG/1
TABLET ORAL
Qty: 90 TABLET | Refills: 0 | Status: SHIPPED | OUTPATIENT
Start: 2023-03-27

## 2023-03-27 RX ORDER — MELOXICAM 15 MG/1
TABLET ORAL
Qty: 90 TABLET | Refills: 0 | Status: SHIPPED | OUTPATIENT
Start: 2023-03-27

## 2023-04-14 DIAGNOSIS — T78.40XD ALLERGY, SUBSEQUENT ENCOUNTER: ICD-10-CM

## 2023-04-14 RX ORDER — MONTELUKAST SODIUM 10 MG/1
10 TABLET ORAL DAILY
Qty: 90 TABLET | Refills: 0 | Status: SHIPPED | OUTPATIENT
Start: 2023-04-14

## 2023-04-26 ENCOUNTER — TRANSCRIBE ORDERS (OUTPATIENT)
Dept: ADMINISTRATIVE | Facility: HOSPITAL | Age: 78
End: 2023-04-26
Payer: MEDICARE

## 2023-04-26 DIAGNOSIS — J84.9 ILD (INTERSTITIAL LUNG DISEASE): Primary | ICD-10-CM

## 2023-05-16 DIAGNOSIS — E78.5 HYPERLIPIDEMIA, UNSPECIFIED HYPERLIPIDEMIA TYPE: ICD-10-CM

## 2023-05-17 RX ORDER — ATORVASTATIN CALCIUM 40 MG/1
TABLET, FILM COATED ORAL
Qty: 90 TABLET | Refills: 0 | Status: SHIPPED | OUTPATIENT
Start: 2023-05-17

## 2023-07-31 DIAGNOSIS — M19.90 ARTHRITIS: ICD-10-CM

## 2023-07-31 DIAGNOSIS — F32.A DEPRESSION, UNSPECIFIED DEPRESSION TYPE: ICD-10-CM

## 2023-08-01 RX ORDER — MELOXICAM 15 MG/1
TABLET ORAL
Qty: 90 TABLET | Refills: 0 | Status: SHIPPED | OUTPATIENT
Start: 2023-08-01

## 2023-08-01 RX ORDER — CITALOPRAM 40 MG/1
TABLET ORAL
Qty: 90 TABLET | Refills: 0 | Status: SHIPPED | OUTPATIENT
Start: 2023-08-01

## 2023-08-05 DIAGNOSIS — T78.40XD ALLERGY, SUBSEQUENT ENCOUNTER: ICD-10-CM

## 2023-08-07 RX ORDER — MONTELUKAST SODIUM 10 MG/1
10 TABLET ORAL DAILY
Qty: 90 TABLET | Refills: 0 | Status: SHIPPED | OUTPATIENT
Start: 2023-08-07

## 2023-08-26 ENCOUNTER — HOSPITAL ENCOUNTER (OUTPATIENT)
Dept: CT IMAGING | Facility: HOSPITAL | Age: 78
Discharge: HOME | End: 2023-08-26
Admitting: INTERNAL MEDICINE
Payer: MEDICARE

## 2023-08-26 DIAGNOSIS — J84.9 ILD (INTERSTITIAL LUNG DISEASE): ICD-10-CM

## 2023-08-26 PROCEDURE — 71250 CT THORAX DX C-: CPT

## 2023-08-31 ENCOUNTER — OFFICE VISIT (OUTPATIENT)
Dept: FAMILY MEDICINE CLINIC | Facility: CLINIC | Age: 78
End: 2023-08-31
Payer: MEDICARE

## 2023-08-31 VITALS
BODY MASS INDEX: 42.01 KG/M2 | TEMPERATURE: 98 F | HEIGHT: 60 IN | HEART RATE: 88 BPM | SYSTOLIC BLOOD PRESSURE: 126 MMHG | WEIGHT: 214 LBS | OXYGEN SATURATION: 93 % | DIASTOLIC BLOOD PRESSURE: 82 MMHG

## 2023-08-31 DIAGNOSIS — E55.9 VITAMIN D DEFICIENCY: ICD-10-CM

## 2023-08-31 DIAGNOSIS — R73.03 PREDIABETES: ICD-10-CM

## 2023-08-31 DIAGNOSIS — R13.10 DYSPHAGIA, UNSPECIFIED TYPE: ICD-10-CM

## 2023-08-31 DIAGNOSIS — E78.5 DYSLIPIDEMIA: ICD-10-CM

## 2023-08-31 DIAGNOSIS — M25.562 CHRONIC PAIN OF BOTH KNEES: ICD-10-CM

## 2023-08-31 DIAGNOSIS — G89.29 CHRONIC PAIN OF BOTH KNEES: ICD-10-CM

## 2023-08-31 DIAGNOSIS — R60.0 EDEMA OF BOTH LEGS: Primary | ICD-10-CM

## 2023-08-31 DIAGNOSIS — M25.561 CHRONIC PAIN OF BOTH KNEES: ICD-10-CM

## 2023-08-31 PROBLEM — J40 BRONCHITIS: Status: RESOLVED | Noted: 2023-02-13 | Resolved: 2023-08-31

## 2023-08-31 PROBLEM — R05.3 CHRONIC COUGH: Status: RESOLVED | Noted: 2022-10-21 | Resolved: 2023-08-31

## 2023-08-31 PROBLEM — R09.82 PND (POST-NASAL DRIP): Status: RESOLVED | Noted: 2019-08-01 | Resolved: 2023-08-31

## 2023-08-31 PROCEDURE — 3079F DIAST BP 80-89 MM HG: CPT | Performed by: FAMILY MEDICINE

## 2023-08-31 PROCEDURE — 3074F SYST BP LT 130 MM HG: CPT | Performed by: FAMILY MEDICINE

## 2023-08-31 PROCEDURE — 99214 OFFICE O/P EST MOD 30 MIN: CPT | Performed by: FAMILY MEDICINE

## 2023-08-31 RX ORDER — HYDROCHLOROTHIAZIDE 25 MG/1
25 TABLET ORAL DAILY
Qty: 30 TABLET | Refills: 3 | Status: SHIPPED | OUTPATIENT
Start: 2023-08-31

## 2023-08-31 NOTE — PROGRESS NOTES
"Chief Complaint  Edema    Subjective        Bella Dukes presents to [facility]  History of Present Illness  Pt has been retaining fluid in both legs left worse than left.  She also has meniscal tears in both knees and had one repaired 15 years ago and now needs to get other one done as well.    She has arthritis all over and takes mobic and tylenol and does help.    HLD- on statin and wants to come off of it.    Prediabetes- stable and needs labs checked.    Trouble swallowing.  Endo dec 2022    Objective   Vital Signs:  /82 (BP Location: Left arm, Patient Position: Sitting, Cuff Size: Large Adult)   Pulse 88   Temp 98 øF (36.7 øC) (Temporal)   Ht 152.4 cm (60\")   Wt 97.1 kg (214 lb)   SpO2 93%   BMI 41.79 kg/mý   Estimated body mass index is 41.79 kg/mý as calculated from the following:    Height as of this encounter: 152.4 cm (60\").    Weight as of this encounter: 97.1 kg (214 lb).               Physical Exam  Vitals and nursing note reviewed.   Constitutional:       Appearance: Normal appearance. She is well-developed.   Cardiovascular:      Rate and Rhythm: Normal rate and regular rhythm.      Heart sounds: Normal heart sounds. No murmur heard.  Pulmonary:      Effort: Pulmonary effort is normal. No respiratory distress.      Breath sounds: Normal breath sounds. No stridor. No wheezing or rhonchi.   Neurological:      General: No focal deficit present.      Mental Status: She is alert and oriented to person, place, and time. She is not disoriented.   Psychiatric:         Mood and Affect: Mood normal.         Behavior: Behavior normal.      Result Review :                   Assessment and Plan   Diagnoses and all orders for this visit:    1. Edema of both legs (Primary)  -     hydroCHLOROthiazide (HYDRODIURIL) 25 MG tablet; Take 1 tablet by mouth Daily.  Dispense: 30 tablet; Refill: 3    2. Chronic pain of both knees  -     Ambulatory Referral to Orthopedic " Surgery    3. Prediabetes  -     Hemoglobin A1c    4. Vitamin D deficiency  -     Vitamin D,25-Hydroxy    5. Dysphagia, unspecified type    6. Dyslipidemia  -     Lipid Panel  -     Comprehensive Metabolic Panel             Follow Up   Return for on or after dec 20th but in 2023.  Patient was given instructions and counseling regarding her condition or for health maintenance advice. Please see specific information pulled into the AVS if appropriate.     Elevate legs.  Start hctz.  Work on diet and exercise.  Follow up with GI and will set up with ortho.

## 2023-09-01 LAB
25(OH)D3+25(OH)D2 SERPL-MCNC: 26.1 NG/ML (ref 30–100)
ALBUMIN SERPL-MCNC: 4.2 G/DL (ref 3.5–5.2)
ALBUMIN/GLOB SERPL: 1.2 G/DL
ALP SERPL-CCNC: 75 U/L (ref 39–117)
ALT SERPL-CCNC: 14 U/L (ref 1–33)
AST SERPL-CCNC: 20 U/L (ref 1–32)
BILIRUB SERPL-MCNC: 0.3 MG/DL (ref 0–1.2)
BUN SERPL-MCNC: 10 MG/DL (ref 8–23)
BUN/CREAT SERPL: 12.2 (ref 7–25)
CALCIUM SERPL-MCNC: 10.3 MG/DL (ref 8.6–10.5)
CHLORIDE SERPL-SCNC: 103 MMOL/L (ref 98–107)
CHOLEST SERPL-MCNC: 170 MG/DL (ref 0–200)
CO2 SERPL-SCNC: 30 MMOL/L (ref 22–29)
CREAT SERPL-MCNC: 0.82 MG/DL (ref 0.57–1)
EGFRCR SERPLBLD CKD-EPI 2021: 73.8 ML/MIN/1.73
GLOBULIN SER CALC-MCNC: 3.5 GM/DL
GLUCOSE SERPL-MCNC: 119 MG/DL (ref 65–99)
HBA1C MFR BLD: 5.9 % (ref 4.8–5.6)
HDLC SERPL-MCNC: 52 MG/DL (ref 40–60)
LDLC SERPL CALC-MCNC: 89 MG/DL (ref 0–100)
POTASSIUM SERPL-SCNC: 3.9 MMOL/L (ref 3.5–5.2)
PROT SERPL-MCNC: 7.7 G/DL (ref 6–8.5)
SODIUM SERPL-SCNC: 143 MMOL/L (ref 136–145)
TRIGL SERPL-MCNC: 171 MG/DL (ref 0–150)
VLDLC SERPL CALC-MCNC: 29 MG/DL (ref 5–40)

## 2023-09-29 DIAGNOSIS — E78.5 HYPERLIPIDEMIA, UNSPECIFIED HYPERLIPIDEMIA TYPE: ICD-10-CM

## 2023-10-01 RX ORDER — ATORVASTATIN CALCIUM 40 MG/1
TABLET, FILM COATED ORAL
Qty: 90 TABLET | Refills: 0 | Status: SHIPPED | OUTPATIENT
Start: 2023-10-01

## 2023-10-31 ENCOUNTER — NURSE TRIAGE (OUTPATIENT)
Dept: CALL CENTER | Facility: HOSPITAL | Age: 78
End: 2023-10-31
Payer: MEDICARE

## 2023-10-31 ENCOUNTER — APPOINTMENT (OUTPATIENT)
Dept: ULTRASOUND IMAGING | Facility: HOSPITAL | Age: 78
End: 2023-10-31
Payer: MEDICARE

## 2023-10-31 ENCOUNTER — APPOINTMENT (OUTPATIENT)
Dept: GENERAL RADIOLOGY | Facility: HOSPITAL | Age: 78
End: 2023-10-31
Payer: MEDICARE

## 2023-10-31 ENCOUNTER — HOSPITAL ENCOUNTER (EMERGENCY)
Facility: HOSPITAL | Age: 78
Discharge: HOME | End: 2023-10-31
Attending: EMERGENCY MEDICINE | Admitting: EMERGENCY MEDICINE
Payer: MEDICARE

## 2023-10-31 ENCOUNTER — TELEPHONE (OUTPATIENT)
Dept: FAMILY MEDICINE CLINIC | Facility: CLINIC | Age: 78
End: 2023-10-31
Payer: MEDICARE

## 2023-10-31 VITALS
BODY MASS INDEX: 42.01 KG/M2 | OXYGEN SATURATION: 97 % | SYSTOLIC BLOOD PRESSURE: 146 MMHG | TEMPERATURE: 97.7 F | DIASTOLIC BLOOD PRESSURE: 89 MMHG | WEIGHT: 214 LBS | RESPIRATION RATE: 18 BRPM | HEIGHT: 60 IN | HEART RATE: 80 BPM

## 2023-10-31 DIAGNOSIS — R60.9 DEPENDENT EDEMA: Primary | ICD-10-CM

## 2023-10-31 LAB
ALBUMIN SERPL-MCNC: 4.1 G/DL (ref 3.5–5.2)
ALBUMIN/GLOB SERPL: 1.3 G/DL
ALP SERPL-CCNC: 76 U/L (ref 39–117)
ALT SERPL W P-5'-P-CCNC: 13 U/L (ref 1–33)
ANION GAP SERPL CALCULATED.3IONS-SCNC: 10.5 MMOL/L (ref 5–15)
AST SERPL-CCNC: 17 U/L (ref 1–32)
BASOPHILS # BLD AUTO: 0.02 10*3/MM3 (ref 0–0.2)
BASOPHILS NFR BLD AUTO: 0.3 % (ref 0–1.5)
BILIRUB SERPL-MCNC: 0.3 MG/DL (ref 0–1.2)
BUN SERPL-MCNC: 11 MG/DL (ref 8–23)
BUN/CREAT SERPL: 13.9 (ref 7–25)
CALCIUM SPEC-SCNC: 10.1 MG/DL (ref 8.6–10.5)
CHLORIDE SERPL-SCNC: 103 MMOL/L (ref 98–107)
CO2 SERPL-SCNC: 26.5 MMOL/L (ref 22–29)
CREAT SERPL-MCNC: 0.79 MG/DL (ref 0.57–1)
D DIMER PPP FEU-MCNC: 0.79 MCGFEU/ML (ref 0–0.77)
D-LACTATE SERPL-SCNC: 0.9 MMOL/L (ref 0.5–2)
DEPRECATED RDW RBC AUTO: 52.6 FL (ref 37–54)
EGFRCR SERPLBLD CKD-EPI 2021: 77.2 ML/MIN/1.73
EOSINOPHIL # BLD AUTO: 0.03 10*3/MM3 (ref 0–0.4)
EOSINOPHIL NFR BLD AUTO: 0.5 % (ref 0.3–6.2)
ERYTHROCYTE [DISTWIDTH] IN BLOOD BY AUTOMATED COUNT: 14 % (ref 12.3–15.4)
GLOBULIN UR ELPH-MCNC: 3.1 GM/DL
GLUCOSE SERPL-MCNC: 88 MG/DL (ref 65–99)
HCT VFR BLD AUTO: 37 % (ref 34–46.6)
HGB BLD-MCNC: 12.4 G/DL (ref 12–15.9)
IMM GRANULOCYTES # BLD AUTO: 0 10*3/MM3 (ref 0–0.05)
IMM GRANULOCYTES NFR BLD AUTO: 0 % (ref 0–0.5)
INR PPP: 1 (ref 0.91–1.09)
LYMPHOCYTES # BLD AUTO: 1.5 10*3/MM3 (ref 0.7–3.1)
LYMPHOCYTES NFR BLD AUTO: 25.5 % (ref 19.6–45.3)
MCH RBC QN AUTO: 33.6 PG (ref 26.6–33)
MCHC RBC AUTO-ENTMCNC: 33.5 G/DL (ref 31.5–35.7)
MCV RBC AUTO: 100.3 FL (ref 79–97)
MONOCYTES # BLD AUTO: 0.58 10*3/MM3 (ref 0.1–0.9)
MONOCYTES NFR BLD AUTO: 9.9 % (ref 5–12)
NEUTROPHILS NFR BLD AUTO: 3.75 10*3/MM3 (ref 1.7–7)
NEUTROPHILS NFR BLD AUTO: 63.8 % (ref 42.7–76)
NT-PROBNP SERPL-MCNC: 620.7 PG/ML (ref 0–1800)
PLATELET # BLD AUTO: 263 10*3/MM3 (ref 140–450)
PMV BLD AUTO: 9.3 FL (ref 6–12)
POTASSIUM SERPL-SCNC: 3.5 MMOL/L (ref 3.5–5.2)
PROT SERPL-MCNC: 7.2 G/DL (ref 6–8.5)
PROTHROMBIN TIME: 11.2 SECONDS (ref 10–13.8)
RBC # BLD AUTO: 3.69 10*6/MM3 (ref 3.77–5.28)
SODIUM SERPL-SCNC: 140 MMOL/L (ref 136–145)
TROPONIN T SERPL HS-MCNC: 13 NG/L
TROPONIN T SERPL HS-MCNC: 13 NG/L
WBC NRBC COR # BLD: 5.88 10*3/MM3 (ref 3.4–10.8)

## 2023-10-31 PROCEDURE — 80053 COMPREHEN METABOLIC PANEL: CPT | Performed by: EMERGENCY MEDICINE

## 2023-10-31 PROCEDURE — 84484 ASSAY OF TROPONIN QUANT: CPT | Performed by: EMERGENCY MEDICINE

## 2023-10-31 PROCEDURE — 85379 FIBRIN DEGRADATION QUANT: CPT | Performed by: EMERGENCY MEDICINE

## 2023-10-31 PROCEDURE — 93010 ELECTROCARDIOGRAM REPORT: CPT | Performed by: INTERNAL MEDICINE

## 2023-10-31 PROCEDURE — 99284 EMERGENCY DEPT VISIT MOD MDM: CPT

## 2023-10-31 PROCEDURE — 93005 ELECTROCARDIOGRAM TRACING: CPT | Performed by: EMERGENCY MEDICINE

## 2023-10-31 PROCEDURE — 83880 ASSAY OF NATRIURETIC PEPTIDE: CPT | Performed by: EMERGENCY MEDICINE

## 2023-10-31 PROCEDURE — 83605 ASSAY OF LACTIC ACID: CPT | Performed by: EMERGENCY MEDICINE

## 2023-10-31 PROCEDURE — 71046 X-RAY EXAM CHEST 2 VIEWS: CPT

## 2023-10-31 PROCEDURE — 85025 COMPLETE CBC W/AUTO DIFF WBC: CPT | Performed by: EMERGENCY MEDICINE

## 2023-10-31 PROCEDURE — 93971 EXTREMITY STUDY: CPT

## 2023-10-31 PROCEDURE — 85610 PROTHROMBIN TIME: CPT | Performed by: EMERGENCY MEDICINE

## 2023-10-31 PROCEDURE — 99284 EMERGENCY DEPT VISIT MOD MDM: CPT | Performed by: EMERGENCY MEDICINE

## 2023-10-31 PROCEDURE — 36415 COLL VENOUS BLD VENIPUNCTURE: CPT

## 2023-10-31 RX ORDER — SODIUM CHLORIDE 0.9 % (FLUSH) 0.9 %
10 SYRINGE (ML) INJECTION AS NEEDED
Status: DISCONTINUED | OUTPATIENT
Start: 2023-10-31 | End: 2023-11-01 | Stop reason: HOSPADM

## 2023-10-31 NOTE — TELEPHONE ENCOUNTER
Shortness of Breath 10/31/2023 Caller concerned about swelling from knee down on the left and on the right foot not as bad as the left. Shortness of breath.    Hub transferred patient to Nurse triage line due to mention of some shortness of breath. Caller is more concerned about the swelling in her feet and ankles. States she has been on a diuretic for a month for swelling in her left foot up to her knee. Now has swelling in her Right foot and ankle as well. Attempted warm transfer to Dr. Joshua's office with no answer.   Late entry for 10/31/2023 1625 Caller advised to be seen either at  or ED for this increased swelling unable to reach PCP. Caller agrees to go to .States she will go to [facility] .

## 2023-10-31 NOTE — TELEPHONE ENCOUNTER
Reason for Disposition   [1] Thigh, calf, or ankle swelling AND [2] bilateral AND [3] 1 side is more swollen    Additional Information   Negative: Chest pain   Negative: Followed an ankle injury   Negative: [1] Followed a bee sting AND [2] localized swelling (e.g., small area of puffy or swollen skin)   Negative: [1] Followed an insect bite AND [2] localized swelling (e.g., small area of puffy or swollen skin)   Negative: Ankle pain is main symptom   Swelling of both ankles (i.e., pedal edema)   Negative: SEVERE difficulty breathing (e.g., struggling for each breath, speaks in single words)   Negative: Looks like a broken bone or dislocated joint (e.g., crooked or deformed)   Negative: Sounds like a life-threatening emergency to the triager   Negative: Chest pain   Negative: Followed a leg injury   Negative: [1] Followed an insect bite AND [2] localized swelling (e.g., small area of puffy or swollen skin)   Negative: Swelling of one ankle joint   Negative: Swelling of knee is main symptom   Negative: Pregnant   Negative: Postpartum (from 0 to 6 weeks after delivery)   Negative: [1] Heart failure suspected (e.g., ankle swelling, shortness of breath, weight gain) AND [2] recently in hospital for heart failure   Negative: Difficulty breathing at rest   Negative: Entire foot is cool or blue in comparison to other side   Negative: [1] Can't walk or can barely walk AND [2] new-onset   Negative: [1] Difficulty breathing with exertion (e.g., walking) AND [2] new-onset or worsening   Negative: [1] Red area or streak AND [2] fever   Negative: [1] Swelling is painful to touch AND [2] fever   Negative: [1] Cast on leg or ankle AND [2] now increased pain   Negative: Patient sounds very sick or weak to the triager   Negative: SEVERE leg swelling (e.g., swelling extends above knee, entire leg is swollen, weeping fluid)   Negative: [1] Red area or streak [2] large (> 2 in. or 5 cm)   Negative: [1] Thigh or calf pain AND [2] only 1  "side AND [3] present > 1 hour   Negative: [1] Thigh, calf, or ankle swelling AND [2] only 1 side    Answer Assessment - Initial Assessment Questions  1. LOCATION: \"Which ankle is swollen?\" \"Where is the swelling?\"      Both ankles and lower legs, worse on the left   2. ONSET: \"When did the swelling start?\"      Has been being treated with diuretic for the last month for swelling in left leg   3. SWELLING: \"How bad is the swelling?\" Or, \"How large is it?\" (e.g., mild, moderate, severe; size of localized swelling)     - NONE: No joint swelling.    - LOCALIZED: Localized; small area of puffy or swollen skin (e.g., insect bite, skin irritation).    - MILD: Joint looks or feels mildly swollen or puffy.    - MODERATE: Swollen; interferes with normal activities (e.g., work or school); decreased range of movement; may be limping.    - SEVERE: Very swollen; can't move swollen joint at all; limping a lot or unable to walk.      moderate  4. PAIN: \"Is there any pain?\" If Yes, ask: \"How bad is it?\" (Scale 1-10; or mild, moderate, severe)    - NONE (0): no pain.    - MILD (1-3): doesn't interfere with normal activities.     - MODERATE (4-7): interferes with normal activities (e.g., work or school) or awakens from sleep, limping.     - SEVERE (8-10): excruciating pain, unable to do any normal activities, unable to walk.       no  5. CAUSE: \"What do you think caused the ankle swelling?\"      States she thinks it is related to her heart/lung issues  6. OTHER SYMPTOMS: \"Do you have any other symptoms?\" (e.g., fever, chest pain, difficulty breathing, calf pain)      Shortness of breath   7. PREGNANCY: \"Is there any chance you are pregnant?\" \"When was your last menstrual period?\"      na    Answer Assessment - Initial Assessment Questions  1. ONSET: \"When did the swelling start?\" (e.g., minutes, hours, days)      Several weeks ago   2. LOCATION: \"What part of the leg is swollen?\"  \"Are both legs swollen or just one leg?\"      Foot and " "ankle, left worse than right, left up to the knee  3. SEVERITY: \"How bad is the swelling?\" (e.g., localized; mild, moderate, severe)    - Localized: Small area of swelling localized to one leg.    - MILD pedal edema: Swelling limited to foot and ankle, pitting edema < 1/4 inch (6 mm) deep, rest and elevation eliminate most or all swelling.    - MODERATE edema: Swelling of lower leg to knee, pitting edema > 1/4 inch (6 mm) deep, rest and elevation only partially reduce swelling.    - SEVERE edema: Swelling extends above knee, facial or hand swelling present.       moderate  4. REDNESS: \"Does the swelling look red or infected?\"      no  5. PAIN: \"Is the swelling painful to touch?\" If Yes, ask: \"How painful is it?\"   (Scale 1-10; mild, moderate or severe)      no  6. FEVER: \"Do you have a fever?\" If Yes, ask: \"What is it, how was it measured, and when did it start?\"       no  7. CAUSE: \"What do you think is causing the leg swelling?\"      Heart and lung issues  8. MEDICAL HISTORY: \"Do you have a history of blood clots (e.g., DVT), cancer, heart failure, kidney disease, or liver failure?\"      no  9. RECURRENT SYMPTOM: \"Have you had leg swelling before?\" If Yes, ask: \"When was the last time?\" \"What happened that time?\"      no  10. OTHER SYMPTOMS: \"Do you have any other symptoms?\" (e.g., chest pain, difficulty breathing)        Shortness of breath  11. PREGNANCY: \"Is there any chance you are pregnant?\" \"When was your last menstrual period?\"        na    Protocols used: Ankle Swelling-ADULT-AH, Leg Swelling and Edema-ADULT-AH    "

## 2023-10-31 NOTE — FSED PROVIDER NOTE
Subjective   History of Present Illness  78-year-old female with a history of left calf swelling for 2 months and has been on hydrochlorothiazide.  She says is not getting better.  Has no history of heart failure or kidney dysfunction.  Does have a history of infected kidney in the past but no change in kidney function according to the patient.  She is short of breath when she walks across the room that is not new as in the last couple days but she has noticed in the last several months.  She is admits that she is obese and has too much weight on her.  She spoke to an on-call medical person and they recommended she come to the ER if the hydrochlorothiazide is not working and is a single limb that is swollen.      Review of Systems    Past Medical History:   Diagnosis Date    Allergic rhinitis     Allergy     Angina pectoris     Anxiety     Arthritis     Asthma     Carpal tunnel syndrome     Chafing     Chronic lower back pain     Constipation     Depression     Former smoker     GERD (gastroesophageal reflux disease)     Hemorrhoids     High blood pressure     High cholesterol     Hoarseness     Hypertension     Joint pain     both hips    Knee pain     soft tissue    Migraine headache     Migraines     Morbid obesity     Neck pain     Obesity     Other urinary problems     temporary urinary loss of control with cough and/or sneeze    Palpitations     Plantar fasciitis     Pneumonia     Seasonal allergies     Sleep apnea        Allergies   Allergen Reactions    Epinephrine Other (See Comments)     With Dental procedures - passes out       Past Surgical History:   Procedure Laterality Date    BARIATRIC SURGERY      COLONOSCOPY  11/18/2013    HYSTERECTOMY      Not due to cancer    KNEE SURGERY Right     LAPAROSCOPIC GASTRIC BANDING      REPLACEMENT TOTAL KNEE      TONSILLECTOMY         Family History   Problem Relation Age of Onset    Stroke Mother         ischemic    Arthritis Mother     Stroke Father          ischemic    Arthritis Father     Liver cancer Father     Stroke Paternal Grandfather         ischemic    Arthritis Paternal Grandfather     Asthma Brother     Liver cancer Brother     COPD Brother     Arthritis Maternal Grandfather     Arthritis Paternal Grandmother     Arthritis Maternal Grandmother        Social History     Socioeconomic History    Marital status:    Tobacco Use    Smoking status: Former    Smokeless tobacco: Never    Tobacco comments:     smoked less than 1 pack per day for 5 years   Vaping Use    Vaping Use: Never used   Substance and Sexual Activity    Alcohol use: No    Drug use: No    Sexual activity: Defer           Objective   Physical Exam  Vitals and nursing note reviewed.   Constitutional:       Appearance: She is obese.   HENT:      Head: Atraumatic.      Mouth/Throat:      Mouth: Mucous membranes are moist.      Pharynx: Oropharynx is clear.   Eyes:      Extraocular Movements: Extraocular movements intact.   Cardiovascular:      Rate and Rhythm: Normal rate and regular rhythm.      Pulses: Normal pulses.      Heart sounds: No murmur heard.  Pulmonary:      Effort: Pulmonary effort is normal. No respiratory distress.      Breath sounds: Normal breath sounds. No wheezing, rhonchi or rales.   Musculoskeletal:         General: Normal range of motion.      Cervical back: Neck supple.      Comments: Left lower extremity pedal pulses bilaterally or equal.  Less than 2-second cap refill equal.  Left calf is tense tight and larger than the right which is palpable and soft and without tension.  Examinations of the thighs and posterior anterior lateral and Edell showed no tension extra fluid tightness tenderness.  Patient was tender on the left calf.  Especially lateral but also hurt posterior.  No evidence of rash or change in color of the skin.  No evidence of infection.   Skin:     General: Skin is warm and dry.      Capillary Refill: Capillary refill takes less than 2 seconds.       Coloration: Skin is not jaundiced or pale.      Findings: No bruising, erythema, lesion or rash.   Neurological:      General: No focal deficit present.      Mental Status: She is alert. Mental status is at baseline. She is disoriented.   Psychiatric:         Mood and Affect: Mood normal.         Behavior: Behavior normal.         Thought Content: Thought content normal.         Judgment: Judgment normal.         Procedures           ED Course  ED Course as of 10/31/23 2154   Tue Oct 31, 2023   1841 CBC CMP lactate BNP wnl trop 13 so very slightly elevated, ddimer 0.79, usg here for venous usg LLE [AR]   1843 S/o to Dr Brown [facility] exam labs and usg EKG, usg pending and PT INR [AR]      ED Course User Index  [AR] Christa Martinez MD                                           Medical Decision Making  Differential diagnosis includes but not limited to heart failure, kidney dysfunction, fluid overload in one leg due to venous insufficiency, blood clot in left leg, early infection without redness or other diagnostic features,    Patient was endorsed to me by previous emergency physician the patient has dependent edema of the ultrasound is negative the patient's laboratories are normal second troponin is at the same level that is where she rests at 13 I think the patient can be released have her follow-up I explained to her about elevating her legs she sits in a chair no ones ever told her she states how the swelling dependent edema works and now she states that she will try to do better.  She has been on diuretics it had been helping but she is always dependent with her legs down in a chair.  Has a bad left knee and that is why she does not walk much.        Amount and/or Complexity of Data Reviewed  Labs: ordered. Decision-making details documented in ED Course.     Details: Patient's BMP is normal normal BUN and creatinine no renal insufficiency the patient's INR is 1.0 the troponin is 13 both times it was  measured and the D-dimer was minimally elevated with 0.79 lactic acid was normal at 0.9 white count is only 5.88 and the patient's hemoglobin is 12.4.  Radiology: ordered and independent interpretation performed.     Details: Chest xray no pneumothorax or pneumonia    Patient's ultrasound is negative for any clots  ECG/medicine tests: ordered and independent interpretation performed.     Details: EKG no change from 2/6/23 and 1/16/22 NSR rate 77 RBBB     Risk  Prescription drug management.        Final diagnoses:   Dependent edema       ED Disposition  ED Disposition       ED Disposition   Discharge    Condition   Stable    Comment   --               Merari Joshua MD  [address] [address]  [phone]    In 1 week           Medication List      No changes were made to your prescriptions during this visit.

## 2023-10-31 NOTE — TELEPHONE ENCOUNTER
Caller: Bella Dukes    Relationship: Self    Best call back number: [phone]    Which medication are you concerned about: hydroCHLOROthiazide (HYDRODIURIL) 25 MG tablet (DIURETIC)    Who prescribed you this medication: KRUNAL    What are your concerns: SHE DOES NOT THINK IT IS WORKING.  SHE WOULD LIKE TO KNOW IF YOU WANT TO INCREASE THE DOSE

## 2023-11-01 DIAGNOSIS — F32.A DEPRESSION, UNSPECIFIED DEPRESSION TYPE: ICD-10-CM

## 2023-11-01 LAB
QT INTERVAL: 417 MS
QTC INTERVAL: 472 MS

## 2023-11-01 RX ORDER — BUPROPION HYDROCHLORIDE 300 MG/1
300 TABLET ORAL DAILY
Qty: 90 TABLET | Refills: 0 | Status: SHIPPED | OUTPATIENT
Start: 2023-11-01

## 2023-11-01 NOTE — TELEPHONE ENCOUNTER
Patient called back to report she went to the ED yesterday as her right leg has started swelling as well as of a few days ago. She is concerned that her medication is not strong enough and would like to increase the dosage. She has not noticed any significant decrease in swelling in her left leg.

## 2023-11-01 NOTE — TELEPHONE ENCOUNTER
LOV             8/31/2023   NOV            12/21/2023   Last RF       6/9/22  Protocol       not met    FRANCESCO Bardales/LMR

## 2023-11-01 NOTE — DISCHARGE INSTRUCTIONS
Lay with your head elevated when you lay down but keep your chest at the same level as your pelvis and elevate your legs as much as possible so that you can get the swelling down move your ankles up and down while you are laying there this will help pump the fluid back to your heart.  Walking will also pump the fluid back to your heart.  Sitting and standing will cause the fluid to stay in your legs and cause more swelling.

## 2023-11-02 NOTE — TELEPHONE ENCOUNTER
Per Dr. Joshua, patient was advised to take TWO 25 mg Hydrochlorothiazide pills instead of ONE for the time being and to call back if she did not see significant change in a few weeks

## 2023-11-08 ENCOUNTER — TELEPHONE (OUTPATIENT)
Dept: FAMILY MEDICINE CLINIC | Facility: CLINIC | Age: 78
End: 2023-11-08
Payer: MEDICARE

## 2023-11-08 NOTE — TELEPHONE ENCOUNTER
Patient called and states her legs, ankles, and feet are swollen.  She's been taking the extra medication that was given a week ago but says it isn't helping.  She says she been having trouble walking,  has shortness or breath, and trouble talking some. She was told recently by another dr that she had long haul covid and had some internal scarring.  She does not want to go to the ER.  She does have a video visit scheduled for 11/09.

## 2023-11-09 ENCOUNTER — TELEMEDICINE (OUTPATIENT)
Dept: FAMILY MEDICINE CLINIC | Facility: CLINIC | Age: 78
End: 2023-11-09
Payer: MEDICARE

## 2023-11-09 DIAGNOSIS — R06.02 SHORTNESS OF BREATH: ICD-10-CM

## 2023-11-09 DIAGNOSIS — I10 ESSENTIAL HYPERTENSION: ICD-10-CM

## 2023-11-09 DIAGNOSIS — R60.0 LOCALIZED EDEMA: Primary | ICD-10-CM

## 2023-11-09 DIAGNOSIS — R60.0 EDEMA OF BOTH LEGS: ICD-10-CM

## 2023-11-09 PROCEDURE — 1160F RVW MEDS BY RX/DR IN RCRD: CPT | Performed by: FAMILY MEDICINE

## 2023-11-09 PROCEDURE — 1159F MED LIST DOCD IN RCRD: CPT | Performed by: FAMILY MEDICINE

## 2023-11-09 PROCEDURE — 99214 OFFICE O/P EST MOD 30 MIN: CPT | Performed by: FAMILY MEDICINE

## 2023-11-09 RX ORDER — FUROSEMIDE 20 MG/1
20 TABLET ORAL 2 TIMES DAILY
Qty: 40 TABLET | Refills: 0 | Status: SHIPPED | OUTPATIENT
Start: 2023-11-09

## 2023-11-09 RX ORDER — POTASSIUM CHLORIDE 20 MEQ/1
20 TABLET, EXTENDED RELEASE ORAL 2 TIMES DAILY
Qty: 60 TABLET | Refills: 0 | Status: SHIPPED | OUTPATIENT
Start: 2023-11-09

## 2023-11-09 NOTE — PROGRESS NOTES
CHIEF COMPLAINT: Edema and shortness of breath    Subjective   Bella Dukes is a 78 y.o. female.     History of Present Illness   Patient presents during the Covid-19 pandemic/federally declared [facility] of public health emergency.  This service was conducted via video visit  PT is at home and I am at my desk at my office.    Patient presents today with a several day history of swelling in legs and some shortness of breath.  She does have long COVID and has some lung scarring from this.  She went to the ER on 31 October and I have reviewed her labs.  Since then her swelling is worsened as has her shortness of breath.  She does have a lung specialist.  Yesterday she was told to go to the ER of the declined and decided to wait for this visit.  Appetite is decreased a little bit.  Never seen a cardiologist  Hypertension-no chest pains or headaches.        The following portions of the patient's history were reviewed and updated as appropriate: allergies, current medications, past family history, past medical history, past social history, past surgical history and problem list.    Review of Systems    Patient Active Problem List   Diagnosis    Dilatation of aorta    Mild intermittent asthma without complication    Constipation    Depression    Gastroesophageal reflux disease without esophagitis    Dyslipidemia    Essential hypertension    Low back pain    Pericardial effusion    Morbid obesity    Thoracic back pain    Dysphagia    Fatigue    Sleep apnea    Medicare annual wellness visit, subsequent    Arm pain, anterior, right    Chronic pain of both knees    Cough present for greater than 3 weeks    Asthma    Arthritis    Allergies    Chronic migraine without aura without status migrainosus, not intractable    Current moderate episode of major depressive disorder without prior episode    Nail fungus    Postmenopausal    Shortness of breath    Vitamin D deficiency    Edema of both legs    Prediabetes       Allergies    Allergen Reactions    Epinephrine Other (See Comments)     With Dental procedures - passes out         Current Outpatient Medications:     albuterol sulfate  (90 Base) MCG/ACT inhaler, Inhale 2 puffs Every 4 (Four) Hours As Needed for Wheezing., Disp: 18 g, Rfl: 3    amitriptyline (ELAVIL) 10 MG tablet, Take 1 tablet by mouth every night at bedtime., Disp: 90 tablet, Rfl: 3    aspirin 81 MG tablet, Take by mouth daily., Disp: , Rfl:     atorvastatin (LIPITOR) 40 MG tablet, TAKE 1 TABLET BY MOUTH EVERY DAY, Disp: 90 tablet, Rfl: 0    buPROPion XL (WELLBUTRIN XL) 150 MG 24 hr tablet, Take 1 tablet by mouth Daily., Disp: 90 tablet, Rfl: 3    buPROPion XL (WELLBUTRIN XL) 300 MG 24 hr tablet, Take 1 tablet by mouth Daily., Disp: 90 tablet, Rfl: 0    cetirizine (ZyrTEC) 10 MG tablet, Take 1 tablet by mouth Daily., Disp: , Rfl:     citalopram (CeleXA) 40 MG tablet, TAKE 1 TABLET BY MOUTH EVERY DAY, Disp: 90 tablet, Rfl: 0    Cyanocobalamin (VITAMIN B12 PO), Take 1 tablet by mouth daily., Disp: , Rfl:     Folic Acid-Vit B6-Vit B12 (FA-VITAMIN B-6-VITAMIN B-12) 2.2-25-0.5 MG tablet, Take  by mouth., Disp: , Rfl:     furosemide (Lasix) 20 MG tablet, Take 1 tablet by mouth 2 (Two) Times a Day., Disp: 40 tablet, Rfl: 0    hydroCHLOROthiazide (HYDRODIURIL) 25 MG tablet, Take 1 tablet by mouth Daily., Disp: 30 tablet, Rfl: 3    losartan (COZAAR) 100 MG tablet, Take 1 tablet by mouth Daily., Disp: 90 tablet, Rfl: 2    meloxicam (MOBIC) 15 MG tablet, TAKE 1 TABLET BY MOUTH EVERY DAY, Disp: 90 tablet, Rfl: 0    montelukast (SINGULAIR) 10 MG tablet, TAKE 1 TABLET BY MOUTH DAILY, Disp: 90 tablet, Rfl: 0    MULTIPLE VITAMINS-MINERALS-FA PO, Take  by mouth., Disp: , Rfl:     multivitamin (THERAGRAN) tablet tablet, Take 1 tablet by mouth Daily., Disp: , Rfl:     pantoprazole (PROTONIX) 40 MG EC tablet, Take 1 tablet by mouth Daily., Disp: 90 tablet, Rfl: 2    potassium chloride (K-DUR,KLOR-CON) 20 MEQ CR tablet, Take 1 tablet by  mouth 2 (Two) Times a Day., Disp: 60 tablet, Rfl: 0    Pyridoxine HCl (VITAMIN B-6 PO), Take by mouth., Disp: , Rfl:     tolnaftate (Tinactin) 1 % cream, Apply 1 application topically to the appropriate area as directed 2 (Two) Times a Day., Disp: 113 g, Rfl: 6    Past Medical History:   Diagnosis Date    Allergic rhinitis     Allergy     Angina pectoris     Anxiety     Arthritis     Asthma     Carpal tunnel syndrome     Chafing     Chronic lower back pain     Constipation     Depression     Former smoker     GERD (gastroesophageal reflux disease)     Hemorrhoids     High blood pressure     High cholesterol     Hoarseness     Hypertension     Joint pain     both hips    Knee pain     soft tissue    Migraine headache     Migraines     Morbid obesity     Neck pain     Obesity     Other urinary problems     temporary urinary loss of control with cough and/or sneeze    Palpitations     Plantar fasciitis     Pneumonia     Seasonal allergies     Sleep apnea        Past Surgical History:   Procedure Laterality Date    BARIATRIC SURGERY      COLONOSCOPY  11/18/2013    HYSTERECTOMY      Not due to cancer    KNEE SURGERY Right     LAPAROSCOPIC GASTRIC BANDING      REPLACEMENT TOTAL KNEE      TONSILLECTOMY         Family History   Problem Relation Age of Onset    Stroke Mother         ischemic    Arthritis Mother     Stroke Father         ischemic    Arthritis Father     Liver cancer Father     Stroke Paternal Grandfather         ischemic    Arthritis Paternal Grandfather     Asthma Brother     Liver cancer Brother     COPD Brother     Arthritis Maternal Grandfather     Arthritis Paternal Grandmother     Arthritis Maternal Grandmother        Social History     Tobacco Use    Smoking status: Former    Smokeless tobacco: Never    Tobacco comments:     smoked less than 1 pack per day for 5 years   Substance Use Topics    Alcohol use: No            Objective     There were no vitals taken for this visit. Video Visit    Physical  Exam  NAD  AAOx3  No labored breathing.  EOMI   PERRLA      Lab Results   Component Value Date    GLUCOSE 88 10/31/2023    BUN 11 10/31/2023    CREATININE 0.79 10/31/2023    EGFRIFNONA 67 01/16/2022    EGFRIFAFRI 73 12/16/2021    BCR 13.9 10/31/2023    K 3.5 10/31/2023    CO2 26.5 10/31/2023    CALCIUM 10.1 10/31/2023    PROTENTOTREF 7.7 08/31/2023    ALBUMIN 4.1 10/31/2023    LABIL2 1.2 08/31/2023    AST 17 10/31/2023    ALT 13 10/31/2023       WBC   Date Value Ref Range Status   10/31/2023 5.88 3.40 - 10.80 10*3/mm3 Final   10/20/2022 6.59 3.40 - 10.80 10*3/mm3 Final     RBC   Date Value Ref Range Status   10/31/2023 3.69 (L) 3.77 - 5.28 10*6/mm3 Final   10/20/2022 3.83 3.77 - 5.28 10*6/mm3 Final     Hemoglobin   Date Value Ref Range Status   10/31/2023 12.4 12.0 - 15.9 g/dL Final     Hematocrit   Date Value Ref Range Status   10/31/2023 37.0 34.0 - 46.6 % Final     MCV   Date Value Ref Range Status   10/31/2023 100.3 (H) 79.0 - 97.0 fL Final     MCH   Date Value Ref Range Status   10/31/2023 33.6 (H) 26.6 - 33.0 pg Final     MCHC   Date Value Ref Range Status   10/31/2023 33.5 31.5 - 35.7 g/dL Final     RDW   Date Value Ref Range Status   10/31/2023 14.0 12.3 - 15.4 % Final     RDW-SD   Date Value Ref Range Status   10/31/2023 52.6 37.0 - 54.0 fl Final     MPV   Date Value Ref Range Status   10/31/2023 9.3 6.0 - 12.0 fL Final     Platelets   Date Value Ref Range Status   10/31/2023 263 140 - 450 10*3/mm3 Final     Neutrophil %   Date Value Ref Range Status   10/31/2023 63.8 42.7 - 76.0 % Final     Lymphocyte %   Date Value Ref Range Status   10/31/2023 25.5 19.6 - 45.3 % Final     Monocyte %   Date Value Ref Range Status   10/31/2023 9.9 5.0 - 12.0 % Final     Eosinophil %   Date Value Ref Range Status   10/31/2023 0.5 0.3 - 6.2 % Final     Basophil %   Date Value Ref Range Status   10/31/2023 0.3 0.0 - 1.5 % Final     Immature Grans %   Date Value Ref Range Status   10/31/2023 0.0 0.0 - 0.5 % Final  "    Neutrophils, Absolute   Date Value Ref Range Status   10/31/2023 3.75 1.70 - 7.00 10*3/mm3 Final     Lymphocytes, Absolute   Date Value Ref Range Status   10/31/2023 1.50 0.70 - 3.10 10*3/mm3 Final     Monocytes, Absolute   Date Value Ref Range Status   10/31/2023 0.58 0.10 - 0.90 10*3/mm3 Final     Eosinophils, Absolute   Date Value Ref Range Status   10/31/2023 0.03 0.00 - 0.40 10*3/mm3 Final     Basophils, Absolute   Date Value Ref Range Status   10/31/2023 0.02 0.00 - 0.20 10*3/mm3 Final     Immature Grans, Absolute   Date Value Ref Range Status   10/31/2023 0.00 0.00 - 0.05 10*3/mm3 Final     nRBC   Date Value Ref Range Status   10/20/2022 0.0 0.0 - 0.2 /100 WBC Final   01/16/2022 0.0 0.0 - 0.2 /100 WBC Final       Lab Results   Component Value Date    HGBA1C 5.90 (H) 08/31/2023       Lab Results   Component Value Date    HXLYHDFV25 1,546 (H) 10/20/2022       TSH   Date Value Ref Range Status   10/20/2022 0.334 0.270 - 4.200 uIU/mL Final       No results found for: \"CHOL\"  Lab Results   Component Value Date    TRIG 171 (H) 08/31/2023     Lab Results   Component Value Date    HDL 52 08/31/2023     Lab Results   Component Value Date    LDL 89 08/31/2023     Lab Results   Component Value Date    VLDL 29 08/31/2023     Lab Results   Component Value Date    LDLHDL 1.6 02/26/2016         Procedures    Assessment & Plan   Problems Addressed this Visit       Essential hypertension    Relevant Medications    furosemide (Lasix) 20 MG tablet    Edema of both legs    Shortness of breath     Other Visit Diagnoses       Localized edema    -  Primary    Relevant Medications    furosemide (Lasix) 20 MG tablet    potassium chloride (K-DUR,KLOR-CON) 20 MEQ CR tablet          Diagnoses         Codes Comments    Localized edema    -  Primary ICD-10-CM: R60.0  ICD-9-CM: 782.3     Shortness of breath     ICD-10-CM: R06.02  ICD-9-CM: 786.05     Essential hypertension     ICD-10-CM: I10  ICD-9-CM: 401.9     Edema of both legs     " ICD-10-CM: R60.0  ICD-9-CM: 782.3             No orders of the defined types were placed in this encounter.      Current Outpatient Medications   Medication Sig Dispense Refill    albuterol sulfate  (90 Base) MCG/ACT inhaler Inhale 2 puffs Every 4 (Four) Hours As Needed for Wheezing. 18 g 3    amitriptyline (ELAVIL) 10 MG tablet Take 1 tablet by mouth every night at bedtime. 90 tablet 3    aspirin 81 MG tablet Take by mouth daily.      atorvastatin (LIPITOR) 40 MG tablet TAKE 1 TABLET BY MOUTH EVERY DAY 90 tablet 0    buPROPion XL (WELLBUTRIN XL) 150 MG 24 hr tablet Take 1 tablet by mouth Daily. 90 tablet 3    buPROPion XL (WELLBUTRIN XL) 300 MG 24 hr tablet Take 1 tablet by mouth Daily. 90 tablet 0    cetirizine (ZyrTEC) 10 MG tablet Take 1 tablet by mouth Daily.      citalopram (CeleXA) 40 MG tablet TAKE 1 TABLET BY MOUTH EVERY DAY 90 tablet 0    Cyanocobalamin (VITAMIN B12 PO) Take 1 tablet by mouth daily.      Folic Acid-Vit B6-Vit B12 (FA-VITAMIN B-6-VITAMIN B-12) 2.2-25-0.5 MG tablet Take  by mouth.      furosemide (Lasix) 20 MG tablet Take 1 tablet by mouth 2 (Two) Times a Day. 40 tablet 0    hydroCHLOROthiazide (HYDRODIURIL) 25 MG tablet Take 1 tablet by mouth Daily. 30 tablet 3    losartan (COZAAR) 100 MG tablet Take 1 tablet by mouth Daily. 90 tablet 2    meloxicam (MOBIC) 15 MG tablet TAKE 1 TABLET BY MOUTH EVERY DAY 90 tablet 0    montelukast (SINGULAIR) 10 MG tablet TAKE 1 TABLET BY MOUTH DAILY 90 tablet 0    MULTIPLE VITAMINS-MINERALS-FA PO Take  by mouth.      multivitamin (THERAGRAN) tablet tablet Take 1 tablet by mouth Daily.      pantoprazole (PROTONIX) 40 MG EC tablet Take 1 tablet by mouth Daily. 90 tablet 2    potassium chloride (K-DUR,KLOR-CON) 20 MEQ CR tablet Take 1 tablet by mouth 2 (Two) Times a Day. 60 tablet 0    Pyridoxine HCl (VITAMIN B-6 PO) Take by mouth.      tolnaftate (Tinactin) 1 % cream Apply 1 application topically to the appropriate area as directed 2 (Two) Times a Day.  113 g 6     No current facility-administered medications for this visit.       No follow-ups on file.    There are no Patient Instructions on file for this visit.         Time spent on visit:  20 minutes.  This patient has consented to a telehealth visit via video. The visit was scheduled as a video visit to comply with patient safety concerns in accordance with CDC recommendations.  All vitals recorded within this visit are reported by the patient.      Start lasix and potassium and SE discussed.  She will notify her pulmonologist as to what is going on.  She may need to see a cardiologist.  She will continue daily weight checks.  If her symptoms worsen or do not improve go to the ER.  She will let me know later this week or early next week how she is doing otherwise.

## 2023-11-13 DIAGNOSIS — F32.A DEPRESSION, UNSPECIFIED DEPRESSION TYPE: ICD-10-CM

## 2023-11-13 RX ORDER — BUPROPION HYDROCHLORIDE 150 MG/1
150 TABLET ORAL DAILY
Qty: 90 TABLET | Refills: 0 | Status: SHIPPED | OUTPATIENT
Start: 2023-11-13

## 2023-12-21 ENCOUNTER — OFFICE VISIT (OUTPATIENT)
Dept: FAMILY MEDICINE CLINIC | Facility: CLINIC | Age: 78
End: 2023-12-21
Payer: MEDICARE

## 2023-12-21 VITALS
TEMPERATURE: 97.7 F | OXYGEN SATURATION: 97 % | BODY MASS INDEX: 40.84 KG/M2 | HEART RATE: 76 BPM | DIASTOLIC BLOOD PRESSURE: 80 MMHG | SYSTOLIC BLOOD PRESSURE: 130 MMHG | RESPIRATION RATE: 18 BRPM | HEIGHT: 60 IN | WEIGHT: 208 LBS

## 2023-12-21 DIAGNOSIS — Z23 NEED FOR IMMUNIZATION AGAINST INFLUENZA: Primary | ICD-10-CM

## 2023-12-21 PROCEDURE — 3075F SYST BP GE 130 - 139MM HG: CPT | Performed by: FAMILY MEDICINE

## 2023-12-21 PROCEDURE — G0439 PPPS, SUBSEQ VISIT: HCPCS | Performed by: FAMILY MEDICINE

## 2023-12-21 PROCEDURE — 90662 IIV NO PRSV INCREASED AG IM: CPT | Performed by: FAMILY MEDICINE

## 2023-12-21 PROCEDURE — G0008 ADMIN INFLUENZA VIRUS VAC: HCPCS | Performed by: FAMILY MEDICINE

## 2023-12-21 PROCEDURE — 1159F MED LIST DOCD IN RCRD: CPT | Performed by: FAMILY MEDICINE

## 2023-12-21 PROCEDURE — 1160F RVW MEDS BY RX/DR IN RCRD: CPT | Performed by: FAMILY MEDICINE

## 2023-12-21 PROCEDURE — 3079F DIAST BP 80-89 MM HG: CPT | Performed by: FAMILY MEDICINE

## 2023-12-21 PROCEDURE — 1170F FXNL STATUS ASSESSED: CPT | Performed by: FAMILY MEDICINE

## 2023-12-21 NOTE — PROGRESS NOTES
The ABCs of the Annual Wellness Visit  Subsequent Medicare Wellness Visit    Subjective      Bella Dukes is a 78 y.o. female who presents for a Subsequent Medicare Wellness Visit.    The following portions of the patient's history were reviewed and   updated as appropriate: allergies, current medications, past family history, past medical history, past social history, past surgical history, and problem list.    Compared to one year ago, the patient feels her physical   health is the same.    Compared to one year ago, the patient feels her mental   health is the same.    Recent Hospitalizations:  She was not admitted to the hospital during the last year.       Current Medical Providers:  Patient Care Team:  Merari Joshua MD as PCP - General (Family Medicine)    Outpatient Medications Prior to Visit   Medication Sig Dispense Refill    amitriptyline (ELAVIL) 10 MG tablet Take 1 tablet by mouth every night at bedtime. 90 tablet 3    aspirin 81 MG tablet Take by mouth daily.      atorvastatin (LIPITOR) 40 MG tablet TAKE 1 TABLET BY MOUTH EVERY DAY 90 tablet 0    buPROPion XL (WELLBUTRIN XL) 150 MG 24 hr tablet Take 1 tablet by mouth Daily. 90 tablet 0    buPROPion XL (WELLBUTRIN XL) 300 MG 24 hr tablet Take 1 tablet by mouth Daily. 90 tablet 0    cetirizine (ZyrTEC) 10 MG tablet Take 1 tablet by mouth Daily.      citalopram (CeleXA) 40 MG tablet TAKE 1 TABLET BY MOUTH EVERY DAY 90 tablet 0    Cyanocobalamin (VITAMIN B12 PO) Take 1 tablet by mouth daily.      furosemide (Lasix) 20 MG tablet Take 1 tablet by mouth 2 (Two) Times a Day. 40 tablet 0    hydroCHLOROthiazide (HYDRODIURIL) 25 MG tablet Take 1 tablet by mouth Daily. 30 tablet 3    losartan (COZAAR) 100 MG tablet Take 1 tablet by mouth Daily. 90 tablet 2    meloxicam (MOBIC) 15 MG tablet TAKE 1 TABLET BY MOUTH EVERY DAY 90 tablet 0    montelukast (SINGULAIR) 10 MG tablet TAKE 1 TABLET BY MOUTH DAILY 90 tablet 0    MULTIPLE VITAMINS-MINERALS-FA PO Take  by  mouth.      multivitamin (THERAGRAN) tablet tablet Take 1 tablet by mouth Daily.      pantoprazole (PROTONIX) 40 MG EC tablet Take 1 tablet by mouth Daily. 90 tablet 2    potassium chloride (K-DUR,KLOR-CON) 20 MEQ CR tablet Take 1 tablet by mouth 2 (Two) Times a Day. 60 tablet 0    Pyridoxine HCl (VITAMIN B-6 PO) Take by mouth.      tolnaftate (Tinactin) 1 % cream Apply 1 application topically to the appropriate area as directed 2 (Two) Times a Day. 113 g 6    albuterol sulfate  (90 Base) MCG/ACT inhaler Inhale 2 puffs Every 4 (Four) Hours As Needed for Wheezing. (Patient not taking: Reported on 12/21/2023) 18 g 3    Folic Acid-Vit B6-Vit B12 (FA-VITAMIN B-6-VITAMIN B-12) 2.2-25-0.5 MG tablet Take  by mouth. (Patient not taking: Reported on 12/21/2023)       No facility-administered medications prior to visit.       No opioid medication identified on active medication list. I have reviewed chart for other potential  high risk medication/s and harmful drug interactions in the elderly.        Aspirin is on active medication list. Aspirin use is indicated based on review of current medical condition/s. Pros and cons of this therapy have been discussed today. Benefits of this medication outweigh potential harm.  Patient has been encouraged to continue taking this medication.  .      Patient Active Problem List   Diagnosis    Dilatation of aorta    Mild intermittent asthma without complication    Constipation    Depression    Gastroesophageal reflux disease without esophagitis    Dyslipidemia    Essential hypertension    Low back pain    Pericardial effusion    Morbid obesity    Thoracic back pain    Dysphagia    Fatigue    Sleep apnea    Medicare annual wellness visit, subsequent    Arm pain, anterior, right    Chronic pain of both knees    Cough present for greater than 3 weeks    Asthma    Arthritis    Allergies    Chronic migraine without aura without status migrainosus, not intractable    Current moderate  "episode of major depressive disorder without prior episode    Nail fungus    Postmenopausal    Shortness of breath    Vitamin D deficiency    Edema of both legs    Prediabetes     Advance Care Planning   Advance Care Planning     Advance Directive is not on file.  ACP discussion was held with the patient during this visit. Patient has an advance directive (not in EMR), copy requested.     Objective    Vitals:    23 1403   BP: 130/80   BP Location: Left arm   Patient Position: Sitting   Cuff Size: Large Adult   Pulse: 76   Resp: 18   Temp: 97.7 °F (36.5 °C)   TempSrc: Tympanic   SpO2: 97%   Weight: 94.3 kg (208 lb)   Height: 152.4 cm (60\")     Estimated body mass index is 40.62 kg/m² as calculated from the following:    Height as of this encounter: 152.4 cm (60\").    Weight as of this encounter: 94.3 kg (208 lb).           Does the patient have evidence of cognitive impairment?   No            HEALTH RISK ASSESSMENT    Smoking Status:  Social History     Tobacco Use   Smoking Status Former   Smokeless Tobacco Never   Tobacco Comments    smoked less than 1 pack per day for 5 years     Alcohol Consumption:  Social History     Substance and Sexual Activity   Alcohol Use No     Fall Risk Screen:    STEADI Fall Risk Assessment was completed, and patient is at LOW risk for falls.Assessment completed on:2023    Depression Screenin/21/2023     2:08 PM   PHQ-2/PHQ-9 Depression Screening   Little Interest or Pleasure in Doing Things 1-->several days   Feeling Down, Depressed or Hopeless 1-->several days   PHQ-9: Brief Depression Severity Measure Score 2       Health Habits and Functional and Cognitive Screenin/21/2023     2:00 PM   Functional & Cognitive Status   Do you have difficulty preparing food and eating? No   Do you have difficulty bathing yourself, getting dressed or grooming yourself? No   Do you have difficulty using the toilet? No   Do you have difficulty moving around from place to " place? No   Do you have trouble with steps or getting out of a bed or a chair? No   Current Diet Well Balanced Diet   Dental Exam Up to date   Eye Exam Up to date   Exercise (times per week) 0 times per week   Do you need help using the phone?  No   Are you deaf or do you have serious difficulty hearing?  Yes   Do you need help to go to places out of walking distance? Yes   Do you need help shopping? No   Do you need help preparing meals?  No   Do you need help with housework?  Yes   Do you need help with laundry? No   Do you need help taking your medications? No   Do you need help managing money? No   Do you ever drive or ride in a car without wearing a seat belt? No   Have you felt unusual stress, anger or loneliness in the last month? No   Who do you live with? Spouse   If you need help, do you have trouble finding someone available to you? No   Do you have difficulty concentrating, remembering or making decisions? No       Age-appropriate Screening Schedule:  Refer to the list below for future screening recommendations based on patient's age, sex and/or medical conditions. Orders for these recommended tests are listed in the plan section. The patient has been provided with a written plan.    Health Maintenance   Topic Date Due    DXA SCAN  Never done    TDAP/TD VACCINES (1 - Tdap) Never done    ZOSTER VACCINE (2 of 2) 12/09/2020    COVID-19 Vaccine (5 - 2023-24 season) 09/01/2023    BMI FOLLOWUP  02/13/2024    LIPID PANEL  08/31/2024    ANNUAL WELLNESS VISIT  12/21/2024    COLORECTAL CANCER SCREENING  12/16/2032    INFLUENZA VACCINE  Completed    Pneumococcal Vaccine 65+  Completed    HEPATITIS C SCREENING  Discontinued                  CMS Preventative Services Quick Reference  Risk Factors Identified During Encounter:    None Identified    The above risks/problems have been discussed with the patient.  Pertinent information has been shared with the patient in the After Visit Summary.    Diagnoses and all  orders for this visit:    1. Need for immunization against influenza (Primary)  -     Fluzone High-Dose 65+yrs ([id number])        Follow Up:   Next Medicare Wellness visit to be scheduled in 1 year.      An After Visit Summary and PPPS were made available to the patient.         Preventive Counseling for MWE:  Work on diet and exercise .    Will get dexa scan in the near future.

## 2024-01-19 DIAGNOSIS — G43.709 CHRONIC MIGRAINE WITHOUT AURA WITHOUT STATUS MIGRAINOSUS, NOT INTRACTABLE: ICD-10-CM

## 2024-01-19 DIAGNOSIS — K21.9 GASTROESOPHAGEAL REFLUX DISEASE WITHOUT ESOPHAGITIS: ICD-10-CM

## 2024-01-22 RX ORDER — PANTOPRAZOLE SODIUM 40 MG/1
40 TABLET, DELAYED RELEASE ORAL DAILY
Qty: 90 TABLET | Refills: 0 | Status: SHIPPED | OUTPATIENT
Start: 2024-01-22

## 2024-01-22 RX ORDER — AMITRIPTYLINE HYDROCHLORIDE 10 MG/1
10 TABLET, FILM COATED ORAL
Qty: 90 TABLET | Refills: 0 | Status: SHIPPED | OUTPATIENT
Start: 2024-01-22

## 2024-01-29 DIAGNOSIS — M19.90 ARTHRITIS: ICD-10-CM

## 2024-01-30 RX ORDER — MELOXICAM 15 MG/1
TABLET ORAL
Qty: 90 TABLET | Refills: 0 | Status: SHIPPED | OUTPATIENT
Start: 2024-01-30

## 2024-03-03 DIAGNOSIS — F32.A DEPRESSION, UNSPECIFIED DEPRESSION TYPE: ICD-10-CM

## 2024-03-03 DIAGNOSIS — T78.40XD ALLERGY, SUBSEQUENT ENCOUNTER: ICD-10-CM

## 2024-03-04 RX ORDER — MONTELUKAST SODIUM 10 MG/1
10 TABLET ORAL DAILY
Qty: 90 TABLET | Refills: 0 | Status: SHIPPED | OUTPATIENT
Start: 2024-03-04

## 2024-03-04 RX ORDER — CITALOPRAM 40 MG/1
TABLET ORAL
Qty: 90 TABLET | Refills: 0 | Status: SHIPPED | OUTPATIENT
Start: 2024-03-04

## 2024-05-05 DIAGNOSIS — E78.5 HYPERLIPIDEMIA, UNSPECIFIED HYPERLIPIDEMIA TYPE: ICD-10-CM

## 2024-05-05 DIAGNOSIS — F32.A DEPRESSION, UNSPECIFIED DEPRESSION TYPE: ICD-10-CM

## 2024-05-05 RX ORDER — ATORVASTATIN CALCIUM 40 MG/1
TABLET, FILM COATED ORAL
Qty: 90 TABLET | Refills: 0 | Status: SHIPPED | OUTPATIENT
Start: 2024-05-05

## 2024-05-05 RX ORDER — BUPROPION HYDROCHLORIDE 300 MG/1
300 TABLET ORAL DAILY
Qty: 90 TABLET | Refills: 0 | Status: SHIPPED | OUTPATIENT
Start: 2024-05-05

## 2024-05-05 RX ORDER — BUPROPION HYDROCHLORIDE 150 MG/1
150 TABLET ORAL DAILY
Qty: 90 TABLET | Refills: 0 | Status: SHIPPED | OUTPATIENT
Start: 2024-05-05

## 2024-05-21 DIAGNOSIS — I10 ESSENTIAL HYPERTENSION: ICD-10-CM

## 2024-05-22 RX ORDER — LOSARTAN POTASSIUM 100 MG/1
100 TABLET ORAL DAILY
Qty: 90 TABLET | Refills: 0 | Status: SHIPPED | OUTPATIENT
Start: 2024-05-22

## 2024-06-06 DIAGNOSIS — K21.9 GASTROESOPHAGEAL REFLUX DISEASE WITHOUT ESOPHAGITIS: ICD-10-CM

## 2024-06-06 RX ORDER — PANTOPRAZOLE SODIUM 40 MG/1
40 TABLET, DELAYED RELEASE ORAL DAILY
Qty: 90 TABLET | Refills: 3 | Status: SHIPPED | OUTPATIENT
Start: 2024-06-06

## 2024-06-21 ENCOUNTER — OFFICE VISIT (OUTPATIENT)
Dept: FAMILY MEDICINE CLINIC | Facility: CLINIC | Age: 79
End: 2024-06-21
Payer: MEDICARE

## 2024-06-21 VITALS
SYSTOLIC BLOOD PRESSURE: 132 MMHG | DIASTOLIC BLOOD PRESSURE: 82 MMHG | TEMPERATURE: 98.4 F | HEART RATE: 92 BPM | OXYGEN SATURATION: 95 % | HEIGHT: 60 IN | BODY MASS INDEX: 40.33 KG/M2 | WEIGHT: 205.4 LBS

## 2024-06-21 DIAGNOSIS — R73.03 PREDIABETES: ICD-10-CM

## 2024-06-21 DIAGNOSIS — E78.5 DYSLIPIDEMIA: Primary | ICD-10-CM

## 2024-06-21 DIAGNOSIS — I10 ESSENTIAL HYPERTENSION: ICD-10-CM

## 2024-06-21 DIAGNOSIS — E55.9 VITAMIN D DEFICIENCY: ICD-10-CM

## 2024-06-21 PROCEDURE — G2211 COMPLEX E/M VISIT ADD ON: HCPCS | Performed by: FAMILY MEDICINE

## 2024-06-21 PROCEDURE — 3079F DIAST BP 80-89 MM HG: CPT | Performed by: FAMILY MEDICINE

## 2024-06-21 PROCEDURE — 99214 OFFICE O/P EST MOD 30 MIN: CPT | Performed by: FAMILY MEDICINE

## 2024-06-21 PROCEDURE — 3075F SYST BP GE 130 - 139MM HG: CPT | Performed by: FAMILY MEDICINE

## 2024-06-21 PROCEDURE — 1159F MED LIST DOCD IN RCRD: CPT | Performed by: FAMILY MEDICINE

## 2024-06-21 PROCEDURE — 1126F AMNT PAIN NOTED NONE PRSNT: CPT | Performed by: FAMILY MEDICINE

## 2024-06-21 PROCEDURE — 1160F RVW MEDS BY RX/DR IN RCRD: CPT | Performed by: FAMILY MEDICINE

## 2024-06-21 NOTE — PROGRESS NOTES
"Chief Complaint  Hypertension and Hyperlipidemia    Subjective        Bella Dukes presents to [facility]  Hypertension    Hyperlipidemia      Patient presents today for labs, refills, and  Hypertension-no chest pains or headaches  Hyperlipidemia-on medication; not active  Vit D deficiency- not taking Vit D and levels were low last visit.   Prediabetes- need labs.      Objective   Vital Signs:  /82 (BP Location: Left arm, Patient Position: Sitting, Cuff Size: Adult)   Pulse 92   Temp 98.4 °F (36.9 °C)   Ht 152.4 cm (60\")   Wt 93.2 kg (205 lb 6.4 oz)   SpO2 95%   BMI 40.11 kg/m²   Estimated body mass index is 40.11 kg/m² as calculated from the following:    Height as of this encounter: 152.4 cm (60\").    Weight as of this encounter: 93.2 kg (205 lb 6.4 oz).               Physical Exam  Vitals and nursing note reviewed.   Constitutional:       Appearance: Normal appearance. She is well-developed.   Cardiovascular:      Rate and Rhythm: Normal rate and regular rhythm.      Heart sounds: Normal heart sounds. No murmur heard.  Pulmonary:      Effort: Pulmonary effort is normal. No respiratory distress.      Breath sounds: Normal breath sounds. No stridor. No wheezing or rhonchi.   Neurological:      General: No focal deficit present.      Mental Status: She is alert and oriented to person, place, and time. She is not disoriented.   Psychiatric:         Mood and Affect: Mood normal.         Behavior: Behavior normal.        Result Review :                     Assessment and Plan     Diagnoses and all orders for this visit:    1. Dyslipidemia (Primary)  -     Lipid Panel    2. Essential hypertension  -     Comprehensive Metabolic Panel    3. Prediabetes  -     Hemoglobin A1c    4. Vitamin D deficiency  -     Vitamin D,25-Hydroxy             Follow Up     Return in about 6 months (around 12/21/2024) for hypertension, hyperlipidema.  Patient was given instructions and counseling " regarding her condition or for health maintenance advice. Please see specific information pulled into the AVS if appropriate.       Refills, labs and work on diet and exercise.    Answers submitted by the patient for this visit:  Primary Reason for Visit (Submitted on 6/19/2024)  What is the primary reason for your visit?: Cough

## 2024-06-22 LAB
25(OH)D3+25(OH)D2 SERPL-MCNC: 26.4 NG/ML (ref 30–100)
ALBUMIN SERPL-MCNC: 4.3 G/DL (ref 3.8–4.8)
ALP SERPL-CCNC: 86 IU/L (ref 44–121)
ALT SERPL-CCNC: 15 IU/L (ref 0–32)
AST SERPL-CCNC: 18 IU/L (ref 0–40)
BILIRUB SERPL-MCNC: 0.5 MG/DL (ref 0–1.2)
BUN SERPL-MCNC: 11 MG/DL (ref 8–27)
BUN/CREAT SERPL: 10 (ref 12–28)
CALCIUM SERPL-MCNC: 10.3 MG/DL (ref 8.7–10.3)
CHLORIDE SERPL-SCNC: 102 MMOL/L (ref 96–106)
CHOLEST SERPL-MCNC: 198 MG/DL (ref 100–199)
CO2 SERPL-SCNC: 27 MMOL/L (ref 20–29)
CREAT SERPL-MCNC: 1.05 MG/DL (ref 0.57–1)
EGFRCR SERPLBLD CKD-EPI 2021: 54 ML/MIN/1.73
GLOBULIN SER CALC-MCNC: 3.4 G/DL (ref 1.5–4.5)
GLUCOSE SERPL-MCNC: 138 MG/DL (ref 70–99)
HBA1C MFR BLD: 6.1 % (ref 4.8–5.6)
HDLC SERPL-MCNC: 51 MG/DL
LDLC SERPL CALC-MCNC: 119 MG/DL (ref 0–99)
POTASSIUM SERPL-SCNC: 4.3 MMOL/L (ref 3.5–5.2)
PROT SERPL-MCNC: 7.7 G/DL (ref 6–8.5)
SODIUM SERPL-SCNC: 144 MMOL/L (ref 134–144)
TRIGL SERPL-MCNC: 157 MG/DL (ref 0–149)
VLDLC SERPL CALC-MCNC: 28 MG/DL (ref 5–40)

## 2024-06-23 DIAGNOSIS — E55.9 VITAMIN D DEFICIENCY: Primary | ICD-10-CM

## 2024-06-23 RX ORDER — ERGOCALCIFEROL 1.25 MG/1
50000 CAPSULE ORAL WEEKLY
Qty: 5 CAPSULE | Refills: 5 | Status: SHIPPED | OUTPATIENT
Start: 2024-06-23

## 2024-06-24 ENCOUNTER — TELEPHONE (OUTPATIENT)
Dept: FAMILY MEDICINE CLINIC | Facility: CLINIC | Age: 79
End: 2024-06-24
Payer: MEDICARE

## 2024-06-24 NOTE — TELEPHONE ENCOUNTER
OK to relay     LMTCB    Your Vit D levels are low.  I am starting you on weekly vitamin D pills.  I have sent that in to your pharmacy.  Your kidney function is down slightly.  Will continue to monitor.  Try to avoid antiinflammatories such as ibuprofen or naproxen.  All else is stable.

## 2024-06-24 NOTE — TELEPHONE ENCOUNTER
Name: Bella Dukes      Relationship: Self      Best Callback Number: [phone]       HUB PROVIDED THE RELAY MESSAGE FROM THE OFFICE      PATIENT: HAS FURTHER QUESTIONS AND WOULD LIKE A CALL BACK AT THE FOLLOWING PHONE [phone]    ADDITIONAL INFORMATION:  PATIENT STATES THAT ON MYCHART HER LABS SAY SHE WAS FASTING BUT SHE WAS NOT    PLEASE ADVISE

## 2024-08-11 DIAGNOSIS — T78.40XD ALLERGY, SUBSEQUENT ENCOUNTER: ICD-10-CM

## 2024-08-11 RX ORDER — MONTELUKAST SODIUM 10 MG/1
10 TABLET ORAL DAILY
Qty: 90 TABLET | Refills: 0 | Status: SHIPPED | OUTPATIENT
Start: 2024-08-11

## 2024-08-20 DIAGNOSIS — F32.A DEPRESSION, UNSPECIFIED DEPRESSION TYPE: ICD-10-CM

## 2024-08-20 RX ORDER — CITALOPRAM 40 MG/1
TABLET ORAL
Qty: 90 TABLET | Refills: 0 | Status: SHIPPED | OUTPATIENT
Start: 2024-08-20

## 2024-09-24 ENCOUNTER — APPOINTMENT (OUTPATIENT)
Dept: GENERAL RADIOLOGY | Facility: HOSPITAL | Age: 79
End: 2024-09-24
Payer: MEDICARE

## 2024-09-24 ENCOUNTER — TELEPHONE (OUTPATIENT)
Dept: FAMILY MEDICINE CLINIC | Facility: CLINIC | Age: 79
End: 2024-09-24

## 2024-09-24 ENCOUNTER — HOSPITAL ENCOUNTER (OUTPATIENT)
Facility: HOSPITAL | Age: 79
Setting detail: OBSERVATION
Discharge: HOME | End: 2024-09-26
Attending: EMERGENCY MEDICINE | Admitting: EMERGENCY MEDICINE
Payer: MEDICARE

## 2024-09-24 DIAGNOSIS — R06.00 DYSPNEA, UNSPECIFIED TYPE: ICD-10-CM

## 2024-09-24 DIAGNOSIS — R07.9 CHEST PAIN, UNSPECIFIED TYPE: Primary | ICD-10-CM

## 2024-09-24 DIAGNOSIS — I51.7 CARDIOMEGALY: ICD-10-CM

## 2024-09-24 PROBLEM — I50.9 ACUTE EXACERBATION OF CHF (CONGESTIVE HEART FAILURE): Status: ACTIVE | Noted: 2024-09-24

## 2024-09-24 LAB
ALBUMIN SERPL-MCNC: 4 G/DL (ref 3.5–5.2)
ALBUMIN/GLOB SERPL: 1.2 G/DL
ALP SERPL-CCNC: 93 U/L (ref 39–117)
ALT SERPL W P-5'-P-CCNC: 11 U/L (ref 1–33)
ANION GAP SERPL CALCULATED.3IONS-SCNC: 8.7 MMOL/L (ref 5–15)
AST SERPL-CCNC: 14 U/L (ref 1–32)
BASOPHILS # BLD AUTO: 0.02 10*3/MM3 (ref 0–0.2)
BASOPHILS NFR BLD AUTO: 0.4 % (ref 0–1.5)
BILIRUB SERPL-MCNC: 0.3 MG/DL (ref 0–1.2)
BUN SERPL-MCNC: 10 MG/DL (ref 8–23)
BUN/CREAT SERPL: 11.8 (ref 7–25)
CALCIUM SPEC-SCNC: 10.2 MG/DL (ref 8.6–10.5)
CHLORIDE SERPL-SCNC: 102 MMOL/L (ref 98–107)
CO2 SERPL-SCNC: 29.3 MMOL/L (ref 22–29)
CREAT SERPL-MCNC: 0.85 MG/DL (ref 0.57–1)
D DIMER PPP FEU-MCNC: 0.74 MCGFEU/ML (ref 0–0.78)
DEPRECATED RDW RBC AUTO: 50.9 FL (ref 37–54)
EGFRCR SERPLBLD CKD-EPI 2021: 70.2 ML/MIN/1.73
EOSINOPHIL # BLD AUTO: 0.03 10*3/MM3 (ref 0–0.4)
EOSINOPHIL NFR BLD AUTO: 0.6 % (ref 0.3–6.2)
ERYTHROCYTE [DISTWIDTH] IN BLOOD BY AUTOMATED COUNT: 13.3 % (ref 12.3–15.4)
FLUAV SUBTYP SPEC NAA+PROBE: NOT DETECTED
FLUBV RNA ISLT QL NAA+PROBE: NOT DETECTED
GLOBULIN UR ELPH-MCNC: 3.4 GM/DL
GLUCOSE SERPL-MCNC: 96 MG/DL (ref 65–99)
HCT VFR BLD AUTO: 37.3 % (ref 34–46.6)
HGB BLD-MCNC: 12.1 G/DL (ref 12–15.9)
IMM GRANULOCYTES # BLD AUTO: 0.01 10*3/MM3 (ref 0–0.05)
IMM GRANULOCYTES NFR BLD AUTO: 0.2 % (ref 0–0.5)
INR PPP: 1
LYMPHOCYTES # BLD AUTO: 1.31 10*3/MM3 (ref 0.7–3.1)
LYMPHOCYTES NFR BLD AUTO: 25 % (ref 19.6–45.3)
MCH RBC QN AUTO: 33.2 PG (ref 26.6–33)
MCHC RBC AUTO-ENTMCNC: 32.4 G/DL (ref 31.5–35.7)
MCV RBC AUTO: 102.5 FL (ref 79–97)
MONOCYTES # BLD AUTO: 0.59 10*3/MM3 (ref 0.1–0.9)
MONOCYTES NFR BLD AUTO: 11.2 % (ref 5–12)
NEUTROPHILS NFR BLD AUTO: 3.29 10*3/MM3 (ref 1.7–7)
NEUTROPHILS NFR BLD AUTO: 62.6 % (ref 42.7–76)
NT-PROBNP SERPL-MCNC: 366.1 PG/ML (ref 0–1800)
PLATELET # BLD AUTO: 275 10*3/MM3 (ref 140–450)
PMV BLD AUTO: 9.6 FL (ref 6–12)
POTASSIUM SERPL-SCNC: 3.7 MMOL/L (ref 3.5–5.2)
PROT SERPL-MCNC: 7.4 G/DL (ref 6–8.5)
PROTHROMBIN TIME: 11.7 SECONDS (ref 11–15)
QT INTERVAL: 422 MS
QTC INTERVAL: 490 MS
RBC # BLD AUTO: 3.64 10*6/MM3 (ref 3.77–5.28)
SARS-COV-2 RNA RESP QL NAA+PROBE: NOT DETECTED
SODIUM SERPL-SCNC: 140 MMOL/L (ref 136–145)
TROPONIN T SERPL HS-MCNC: 15 NG/L
WBC NRBC COR # BLD AUTO: 5.25 10*3/MM3 (ref 3.4–10.8)

## 2024-09-24 PROCEDURE — 71046 X-RAY EXAM CHEST 2 VIEWS: CPT

## 2024-09-24 PROCEDURE — 93010 ELECTROCARDIOGRAM REPORT: CPT | Performed by: INTERNAL MEDICINE

## 2024-09-24 PROCEDURE — G0378 HOSPITAL OBSERVATION PER HR: HCPCS

## 2024-09-24 PROCEDURE — 85610 PROTHROMBIN TIME: CPT

## 2024-09-24 PROCEDURE — 85379 FIBRIN DEGRADATION QUANT: CPT | Performed by: EMERGENCY MEDICINE

## 2024-09-24 PROCEDURE — 83880 ASSAY OF NATRIURETIC PEPTIDE: CPT | Performed by: EMERGENCY MEDICINE

## 2024-09-24 PROCEDURE — 36415 COLL VENOUS BLD VENIPUNCTURE: CPT

## 2024-09-24 PROCEDURE — 99285 EMERGENCY DEPT VISIT HI MDM: CPT | Performed by: EMERGENCY MEDICINE

## 2024-09-24 PROCEDURE — 84484 ASSAY OF TROPONIN QUANT: CPT | Performed by: EMERGENCY MEDICINE

## 2024-09-24 PROCEDURE — 80053 COMPREHEN METABOLIC PANEL: CPT | Performed by: EMERGENCY MEDICINE

## 2024-09-24 PROCEDURE — 96374 THER/PROPH/DIAG INJ IV PUSH: CPT

## 2024-09-24 PROCEDURE — 93005 ELECTROCARDIOGRAM TRACING: CPT | Performed by: EMERGENCY MEDICINE

## 2024-09-24 PROCEDURE — 85025 COMPLETE CBC W/AUTO DIFF WBC: CPT | Performed by: EMERGENCY MEDICINE

## 2024-09-24 PROCEDURE — 87636 SARSCOV2 & INF A&B AMP PRB: CPT | Performed by: EMERGENCY MEDICINE

## 2024-09-24 PROCEDURE — 99285 EMERGENCY DEPT VISIT HI MDM: CPT

## 2024-09-24 PROCEDURE — 25010000002 FUROSEMIDE PER 20 MG: Performed by: EMERGENCY MEDICINE

## 2024-09-24 RX ORDER — FUROSEMIDE 10 MG/ML
40 INJECTION INTRAMUSCULAR; INTRAVENOUS ONCE
Status: COMPLETED | OUTPATIENT
Start: 2024-09-24 | End: 2024-09-24

## 2024-09-24 RX ORDER — ASPIRIN 81 MG/1
324 TABLET, CHEWABLE ORAL ONCE
Status: COMPLETED | OUTPATIENT
Start: 2024-09-24 | End: 2024-09-24

## 2024-09-24 RX ADMIN — ASPIRIN 324 MG: 81 TABLET, CHEWABLE ORAL at 17:54

## 2024-09-24 RX ADMIN — FUROSEMIDE 40 MG: 10 INJECTION, SOLUTION INTRAMUSCULAR; INTRAVENOUS at 18:54

## 2024-09-24 NOTE — FSED PROVIDER NOTE
"Subjective   History of Present Illness  80yo female pmh significant htn/hyperlipidemia/ILD/BALWINDER presents ED c/o 2 month hx increased generalized fatigue/soa/emery.  ROS (+) productive cough \"green\" sputum.  ROS (+) intermittent left sided chest pain; denies chest pain at time of exam.  Denies fever/chills/wheezing/orthopnea/pnd/edema.    History provided by:  Patient  Shortness of Breath  Associated symptoms: chest pain and cough    Associated symptoms: no wheezing        Review of Systems   Constitutional:  Positive for fatigue.   HENT: Negative.     Eyes: Negative.    Respiratory:  Positive for cough and shortness of breath. Negative for wheezing.    Cardiovascular:  Positive for chest pain. Negative for palpitations and leg swelling.   Gastrointestinal: Negative.    Genitourinary: Negative.    Allergic/Immunologic: Negative for immunocompromised state.   All other systems reviewed and are negative.      Past Medical History:   Diagnosis Date    Allergic rhinitis     Allergy     Angina pectoris     Anxiety     Arthritis     Asthma     Carpal tunnel syndrome     Chafing     Chronic lower back pain     Constipation     Depression     Former smoker     GERD (gastroesophageal reflux disease)     Hemorrhoids     High blood pressure     High cholesterol     Hoarseness     Hypertension     Joint pain     both hips    Knee pain     soft tissue    Migraine headache     Migraines     Morbid obesity     Neck pain     Obesity     Other urinary problems     temporary urinary loss of control with cough and/or sneeze    Palpitations     Plantar fasciitis     Pneumonia     Seasonal allergies     Sleep apnea        Allergies   Allergen Reactions    Epinephrine Other (See Comments)     With Dental procedures - passes out       Past Surgical History:   Procedure Laterality Date    BARIATRIC SURGERY      COLONOSCOPY  11/18/2013    HYSTERECTOMY      Not due to cancer    KNEE SURGERY Right     LAPAROSCOPIC GASTRIC BANDING      " REPLACEMENT TOTAL KNEE      TONSILLECTOMY         Family History   Problem Relation Age of Onset    Stroke Mother         ischemic    Arthritis Mother     Stroke Father         ischemic    Arthritis Father     Liver cancer Father     Stroke Paternal Grandfather         ischemic    Arthritis Paternal Grandfather     Asthma Brother     Liver cancer Brother     COPD Brother     Arthritis Maternal Grandfather     Arthritis Paternal Grandmother     Arthritis Maternal Grandmother        Social History     Socioeconomic History    Marital status:    Tobacco Use    Smoking status: Former    Smokeless tobacco: Never    Tobacco comments:     smoked less than 1 pack per day for 5 years   Vaping Use    Vaping status: Never Used   Substance and Sexual Activity    Alcohol use: No    Drug use: No    Sexual activity: Defer           Objective   Physical Exam  Vitals and nursing note reviewed.   Constitutional:       Appearance: She is obese.   HENT:      Head: Normocephalic and atraumatic.      Right Ear: External ear normal.      Left Ear: External ear normal.      Nose: Nose normal. No congestion or rhinorrhea.      Mouth/Throat:      Mouth: Mucous membranes are moist.      Pharynx: Oropharynx is clear.   Eyes:      Pupils: Pupils are equal, round, and reactive to light.   Cardiovascular:      Rate and Rhythm: Normal rate and regular rhythm.      Pulses: Normal pulses.      Heart sounds: Normal heart sounds. No murmur heard.     No friction rub. No gallop.   Pulmonary:      Effort: Pulmonary effort is normal. No respiratory distress.      Breath sounds: Normal breath sounds. No wheezing, rhonchi or rales.   Abdominal:      General: Bowel sounds are normal. There is no distension.      Palpations: Abdomen is soft.      Tenderness: There is no abdominal tenderness. There is no guarding or rebound.   Musculoskeletal:         General: No swelling or deformity.      Cervical back: Normal range of motion and neck supple. No  rigidity.      Right lower leg: No edema.      Left lower leg: No edema.   Lymphadenopathy:      Cervical: No cervical adenopathy.   Skin:     General: Skin is warm and dry.   Neurological:      General: No focal deficit present.      Mental Status: She is alert and oriented to person, place, and time.      GCS: GCS eye subscore is 4. GCS verbal subscore is 5. GCS motor subscore is 6.         ECG 12 Lead      Date/Time: 9/24/2024 5:03 PM    Performed by: Dominik Skaggs MD  Authorized by: Dominik Skaggs MD  Interpreted by ED physician  Comparison: compared with previous ECG from 10/31/2023  Similar to previous ECG  Rhythm: sinus rhythm  Rate: normal  BPM: 81  QRS axis: normal  Conduction: right bundle branch block  ST Segments: ST segments normal  T depression: V1, V6, V5, V4, V3 and V2  Q waves: III and aVF  Clinical impression: abnormal ECG               ED Course      Labs Reviewed   COMPREHENSIVE METABOLIC PANEL - Abnormal; Notable for the following components:       Result Value    CO2 29.3 (*)     All other components within normal limits    Narrative:     GFR Normal >60  Chronic Kidney Disease <60  Kidney Failure <15    The GFR formula is only valid for adults with stable renal function between ages 18 and 70.   TROPONIN - Abnormal; Notable for the following components:    HS Troponin T 15 (*)     All other components within normal limits    Narrative:     High Sensitive Troponin T Reference Range:  <14.0 ng/L- Negative Female for AMI  <22.0 ng/L- Negative Male for AMI  >=14 - Abnormal Female indicating possible myocardial injury.  >=22 - Abnormal Male indicating possible myocardial injury.   Clinicians would have to utilize clinical acumen, EKG, Troponin, and serial changes to determine if it is an Acute Myocardial Infarction or myocardial injury due to an underlying chronic condition.        CBC WITH AUTO DIFFERENTIAL - Abnormal; Notable for the following components:    RBC 3.64 (*)     .5 (*)      MCH 33.2 (*)     All other components within normal limits   COVID-19 AND FLU A/B, NP SWAB IN TRANSPORT MEDIA 1 HR TAT - Normal    Narrative:     Fact sheet for providers: https://www.fda.gov/media/869513/download    Fact sheet for patients: https://www.fda.gov/media/413470/download    Test performed by PCR.   BNP (IN-HOUSE) - Normal    Narrative:     This assay is used as an aid in the diagnosis of individuals suspected of having heart failure. It can be used as an aid in the diagnosis of acute decompensated heart failure (ADHF) in patients presenting with signs and symptoms of ADHF to the emergency department (ED). In addition, NT-proBNP of <300 pg/mL indicates ADHF is not likely.    Age Range Result Interpretation  NT-proBNP Concentration (pg/mL:      <50             Positive            >450                   Gray                 300-450                    Negative             <300    50-75           Positive            >900                  Gray                300-900                  Negative            <300      >75             Positive            >1800                  Gray                300-1800                  Negative            <300   D-DIMER, QUANTITATIVE - Normal    Narrative:     According to the assay 's published package insert, a normal (<0.50 MCGFEU/mL) D-dimer result in conjunction with a non-high clinical probability assessment, excludes deep vein thrombosis (DVT) and pulmonary embolism (PE) with high sensitivity.    D-dimer values increase with age and this can make VTE exclusion of an older population difficult. To address this, the [employer], based on best available evidence and recent guidelines, recommends that clinicians use age-adjusted D-dimer thresholds in patients greater than 50 years of age with: a) a low probability of PE who do not meet all Pulmonary Embolism Rule Out Criteria, or b) in those with intermediate probability of PE.   The formula for  "an age-adjusted D-dimer cut-off is \"age/100\".  For example, a 60 year old patient would have an age-adjusted cut-off of 0.60 MCGFEU/mL and an 80 year old 0.80 MCGFEU/mL.   HIGH SENSITIVITIY TROPONIN T 2HR   POCT PROTIME - INR   CBC AND DIFFERENTIAL    Narrative:     The following orders were created for panel order CBC & Differential.  Procedure                               Abnormality         Status                     ---------                               -----------         ------                     CBC Auto Differential[[id number]]        Abnormal            Final result                 Please view results for these tests on the individual orders.     XR Chest 2 View    Result Date: 9/24/2024  Narrative: XR CHEST 2 VW-   INDICATION: Shortness of air  COMPARISON: Chest radiograph October 31, 2023  TECHNIQUE: 2 view chest  FINDINGS:  Vascular congestion. No focal opacity. No effusions. Enlarged cardiac silhouette. Gastric band seen.      Impression: Cardiomegaly with vascular congestion, without edema  This report was finalized on 9/24/2024 5:45 PM by Dr. Jerald Blanco M.D on Workstation: [id number]               HEART Score: 6                  HEART Score for Major Cardiac Events - MDCalc  Calculated on Sep 24 2024 5:46 PM  6 points -> Moderate Score (4-6 points) Risk of MACE of 12-16.6%.  If troponin is positive, many experts recommend further workup and admission even with a low HEART Score.          Medical Decision Making  Problems Addressed:  Cardiomegaly: complicated acute illness or injury  Chest pain, unspecified type: complicated acute illness or injury  Dyspnea, unspecified type: complicated acute illness or injury    Amount and/or Complexity of Data Reviewed  Labs: ordered.  Radiology: ordered.  ECG/medicine tests: ordered and independent interpretation performed.    Risk  OTC drugs.  Prescription drug management.  Decision regarding hospitalization.        Final diagnoses:   Chest pain, " unspecified type   Dyspnea, unspecified type   Cardiomegaly       ED Disposition  ED Disposition       ED Disposition   Decision to Admit    Condition   --    Comment   Level of Care: Observation Unit [28]   Diagnosis: Dyspnea [501438]   Admitting Physician: VASYL DEGROOT [[id number]]   Attending Physician: VASYL DEGROOT [[id number]]                 No follow-up provider specified.       Medication List      No changes were made to your prescriptions during this visit.

## 2024-09-24 NOTE — TELEPHONE ENCOUNTER
Caller: Bella Dukes    Relationship to patient: Self    Best call back number: [phone]     Chief complaint: PATIENT CALLING WITH SHORTNESS OF BREATHE AT REST AND WHILE IN MOTION SHE STATES THAT SHE HAS BEEN EXPERIENCING THIS FOR ABOUT 2 MONTHS    Patient directed to call 911 or go to their nearest emergency room.     Patient verbalized understanding: [x] Yes  [] No  If no, why?    Additional notes

## 2024-09-25 ENCOUNTER — APPOINTMENT (OUTPATIENT)
Dept: CARDIOLOGY | Facility: HOSPITAL | Age: 79
End: 2024-09-25
Payer: MEDICARE

## 2024-09-25 ENCOUNTER — APPOINTMENT (OUTPATIENT)
Dept: NUCLEAR MEDICINE | Facility: HOSPITAL | Age: 79
End: 2024-09-25
Payer: MEDICARE

## 2024-09-25 ENCOUNTER — APPOINTMENT (OUTPATIENT)
Dept: CT IMAGING | Facility: HOSPITAL | Age: 79
End: 2024-09-25
Payer: MEDICARE

## 2024-09-25 LAB
ANION GAP SERPL CALCULATED.3IONS-SCNC: 12 MMOL/L (ref 5–15)
AORTIC DIMENSIONLESS INDEX: 0.9 (DI)
ASCENDING AORTA: 3.7 CM
BASOPHILS # BLD MANUAL: 0 10*3/MM3 (ref 0–0.2)
BASOPHILS NFR BLD MANUAL: 0 % (ref 0–1.5)
BH CV ECHO MEAS - ACS: 1.72 CM
BH CV ECHO MEAS - AI P1/2T: 1287 MSEC
BH CV ECHO MEAS - AO MAX PG: 6.4 MMHG
BH CV ECHO MEAS - AO MEAN PG: 3.3 MMHG
BH CV ECHO MEAS - AO ROOT DIAM: 3.5 CM
BH CV ECHO MEAS - AO V2 MAX: 126.9 CM/SEC
BH CV ECHO MEAS - AO V2 VTI: 28.3 CM
BH CV ECHO MEAS - AVA(I,D): 2.7 CM2
BH CV ECHO MEAS - EDV(CUBED): 98.2 ML
BH CV ECHO MEAS - EDV(MOD-SP2): 128 ML
BH CV ECHO MEAS - EDV(MOD-SP4): 137 ML
BH CV ECHO MEAS - EF(MOD-BP): 70.3 %
BH CV ECHO MEAS - EF(MOD-SP2): 70.3 %
BH CV ECHO MEAS - EF(MOD-SP4): 69.3 %
BH CV ECHO MEAS - ESV(CUBED): 15.4 ML
BH CV ECHO MEAS - ESV(MOD-SP2): 38 ML
BH CV ECHO MEAS - ESV(MOD-SP4): 42 ML
BH CV ECHO MEAS - FS: 46.1 %
BH CV ECHO MEAS - IVS/LVPW: 1.05 CM
BH CV ECHO MEAS - IVSD: 1.07 CM
BH CV ECHO MEAS - LAT PEAK E' VEL: 11.4 CM/SEC
BH CV ECHO MEAS - LV DIASTOLIC VOL/BSA (35-75): 72.9 CM2
BH CV ECHO MEAS - LV MASS(C)D: 169 GRAMS
BH CV ECHO MEAS - LV MAX PG: 5.7 MMHG
BH CV ECHO MEAS - LV MEAN PG: 2.32 MMHG
BH CV ECHO MEAS - LV SYSTOLIC VOL/BSA (12-30): 22.4 CM2
BH CV ECHO MEAS - LV V1 MAX: 119.4 CM/SEC
BH CV ECHO MEAS - LV V1 VTI: 24.9 CM
BH CV ECHO MEAS - LVIDD: 4.6 CM
BH CV ECHO MEAS - LVIDS: 2.49 CM
BH CV ECHO MEAS - LVOT AREA: 3.1 CM2
BH CV ECHO MEAS - LVOT DIAM: 1.97 CM
BH CV ECHO MEAS - LVPWD: 1.02 CM
BH CV ECHO MEAS - MED PEAK E' VEL: 6.3 CM/SEC
BH CV ECHO MEAS - MR MAX PG: 47.8 MMHG
BH CV ECHO MEAS - MR MAX VEL: 345.8 CM/SEC
BH CV ECHO MEAS - MV A DUR: 0.12 SEC
BH CV ECHO MEAS - MV A MAX VEL: 118.2 CM/SEC
BH CV ECHO MEAS - MV DEC SLOPE: 207.5 CM/SEC2
BH CV ECHO MEAS - MV DEC TIME: 0.39 SEC
BH CV ECHO MEAS - MV E MAX VEL: 65.4 CM/SEC
BH CV ECHO MEAS - MV E/A: 0.55
BH CV ECHO MEAS - MV MAX PG: 5.5 MMHG
BH CV ECHO MEAS - MV MEAN PG: 1.65 MMHG
BH CV ECHO MEAS - MV P1/2T: 111.6 MSEC
BH CV ECHO MEAS - MV V2 VTI: 40.3 CM
BH CV ECHO MEAS - MVA(P1/2T): 1.97 CM2
BH CV ECHO MEAS - MVA(VTI): 1.89 CM2
BH CV ECHO MEAS - PA V2 MAX: 121.9 CM/SEC
BH CV ECHO MEAS - QP/QS: 0.8
BH CV ECHO MEAS - RAP SYSTOLE: 15 MMHG
BH CV ECHO MEAS - RV MAX PG: 4.2 MMHG
BH CV ECHO MEAS - RV V1 MAX: 102.6 CM/SEC
BH CV ECHO MEAS - RV V1 VTI: 20.7 CM
BH CV ECHO MEAS - RVOT DIAM: 1.93 CM
BH CV ECHO MEAS - SV(LVOT): 76 ML
BH CV ECHO MEAS - SV(MOD-SP2): 90 ML
BH CV ECHO MEAS - SV(MOD-SP4): 95 ML
BH CV ECHO MEAS - SV(RVOT): 60.8 ML
BH CV ECHO MEAS - SVI(LVOT): 40.5 ML/M2
BH CV ECHO MEAS - SVI(MOD-SP2): 47.9 ML/M2
BH CV ECHO MEAS - SVI(MOD-SP4): 50.6 ML/M2
BH CV ECHO MEAS - TAPSE (>1.6): 2.24 CM
BH CV ECHO MEASUREMENTS AVERAGE E/E' RATIO: 7.39
BH CV REST NUCLEAR ISOTOPE DOSE: 8.4 MCI
BH CV STRESS COMMENTS STAGE 1: NORMAL
BH CV STRESS DOSE REGADENOSON STAGE 1: 0.4
BH CV STRESS DURATION MIN STAGE 1: 0
BH CV STRESS DURATION SEC STAGE 1: 10
BH CV STRESS NUCLEAR ISOTOPE DOSE: 30 MCI
BH CV STRESS PROTOCOL 1: NORMAL
BH CV STRESS RECOVERY BP: NORMAL MMHG
BH CV STRESS RECOVERY HR: 83 BPM
BH CV STRESS STAGE 1: 1
BH CV XLRA - TDI S': 14 CM/SEC
BUN SERPL-MCNC: 9 MG/DL (ref 8–23)
BUN/CREAT SERPL: 11.5 (ref 7–25)
CALCIUM SPEC-SCNC: 9.5 MG/DL (ref 8.6–10.5)
CHLORIDE SERPL-SCNC: 105 MMOL/L (ref 98–107)
CO2 SERPL-SCNC: 25 MMOL/L (ref 22–29)
CREAT SERPL-MCNC: 0.78 MG/DL (ref 0.57–1)
DEPRECATED RDW RBC AUTO: 45 FL (ref 37–54)
EGFRCR SERPLBLD CKD-EPI 2021: 77.9 ML/MIN/1.73
EOSINOPHIL # BLD MANUAL: 0.1 10*3/MM3 (ref 0–0.4)
EOSINOPHIL NFR BLD MANUAL: 2.1 % (ref 0.3–6.2)
ERYTHROCYTE [DISTWIDTH] IN BLOOD BY AUTOMATED COUNT: 12.5 % (ref 12.3–15.4)
GEN 5 2HR TROPONIN T REFLEX: 13 NG/L
GLUCOSE SERPL-MCNC: 85 MG/DL (ref 65–99)
HCT VFR BLD AUTO: 35.6 % (ref 34–46.6)
HGB BLD-MCNC: 11.7 G/DL (ref 12–15.9)
LEFT ATRIUM VOLUME INDEX: 26.9 ML/M2
LV EF NUC BP: 77 %
LYMPHOCYTES # BLD MANUAL: 1.78 10*3/MM3 (ref 0.7–3.1)
LYMPHOCYTES NFR BLD MANUAL: 4.2 % (ref 5–12)
MAGNESIUM SERPL-MCNC: 2.2 MG/DL (ref 1.6–2.4)
MAXIMAL PREDICTED HEART RATE: 142 BPM
MCH RBC QN AUTO: 32.6 PG (ref 26.6–33)
MCHC RBC AUTO-ENTMCNC: 32.9 G/DL (ref 31.5–35.7)
MCV RBC AUTO: 99.2 FL (ref 79–97)
MONOCYTES # BLD: 0.2 10*3/MM3 (ref 0.1–0.9)
NEUTROPHILS # BLD AUTO: 2.67 10*3/MM3 (ref 1.7–7)
NEUTROPHILS NFR BLD MANUAL: 56.3 % (ref 42.7–76)
PERCENT MAX PREDICTED HR: 65.49 %
PLAT MORPH BLD: NORMAL
PLATELET # BLD AUTO: 271 10*3/MM3 (ref 140–450)
PMV BLD AUTO: 9.4 FL (ref 6–12)
POTASSIUM SERPL-SCNC: 3.4 MMOL/L (ref 3.5–5.2)
POTASSIUM SERPL-SCNC: 3.4 MMOL/L (ref 3.5–5.2)
RBC # BLD AUTO: 3.59 10*6/MM3 (ref 3.77–5.28)
RBC MORPH BLD: NORMAL
SINUS: 3.1 CM
SMUDGE CELLS BLD QL SMEAR: ABNORMAL
SODIUM SERPL-SCNC: 142 MMOL/L (ref 136–145)
STJ: 2.46 CM
STRESS BASELINE BP: NORMAL MMHG
STRESS BASELINE HR: 69 BPM
STRESS PERCENT HR: 77 %
STRESS POST PEAK BP: NORMAL MMHG
STRESS POST PEAK HR: 93 BPM
STRESS TARGET HR: 121 BPM
TROPONIN T DELTA: -2 NG/L
TROPONIN T SERPL HS-MCNC: 13 NG/L
TSH SERPL DL<=0.05 MIU/L-ACNC: 0.33 UIU/ML (ref 0.27–4.2)
VARIANT LYMPHS NFR BLD MANUAL: 1 % (ref 0–5)
VARIANT LYMPHS NFR BLD MANUAL: 36.5 % (ref 19.6–45.3)
WBC NRBC COR # BLD AUTO: 4.75 10*3/MM3 (ref 3.4–10.8)

## 2024-09-25 PROCEDURE — G0378 HOSPITAL OBSERVATION PER HR: HCPCS

## 2024-09-25 PROCEDURE — 96376 TX/PRO/DX INJ SAME DRUG ADON: CPT

## 2024-09-25 PROCEDURE — 78452 HT MUSCLE IMAGE SPECT MULT: CPT

## 2024-09-25 PROCEDURE — 80048 BASIC METABOLIC PNL TOTAL CA: CPT | Performed by: PHYSICIAN ASSISTANT

## 2024-09-25 PROCEDURE — 25010000002 REGADENOSON 0.4 MG/5ML SOLUTION: Performed by: EMERGENCY MEDICINE

## 2024-09-25 PROCEDURE — 93306 TTE W/DOPPLER COMPLETE: CPT

## 2024-09-25 PROCEDURE — 85007 BL SMEAR W/DIFF WBC COUNT: CPT | Performed by: PHYSICIAN ASSISTANT

## 2024-09-25 PROCEDURE — 84132 ASSAY OF SERUM POTASSIUM: CPT | Performed by: PHYSICIAN ASSISTANT

## 2024-09-25 PROCEDURE — 93016 CV STRESS TEST SUPVJ ONLY: CPT | Performed by: INTERNAL MEDICINE

## 2024-09-25 PROCEDURE — A9502 TC99M TETROFOSMIN: HCPCS | Performed by: EMERGENCY MEDICINE

## 2024-09-25 PROCEDURE — 84484 ASSAY OF TROPONIN QUANT: CPT | Performed by: PHYSICIAN ASSISTANT

## 2024-09-25 PROCEDURE — 25510000001 PERFLUTREN 6.52 MG/ML SUSPENSION 2 ML VIAL: Performed by: PHYSICIAN ASSISTANT

## 2024-09-25 PROCEDURE — 93017 CV STRESS TEST TRACING ONLY: CPT

## 2024-09-25 PROCEDURE — 78452 HT MUSCLE IMAGE SPECT MULT: CPT | Performed by: INTERNAL MEDICINE

## 2024-09-25 PROCEDURE — 84443 ASSAY THYROID STIM HORMONE: CPT | Performed by: PHYSICIAN ASSISTANT

## 2024-09-25 PROCEDURE — 99204 OFFICE O/P NEW MOD 45 MIN: CPT | Performed by: INTERNAL MEDICINE

## 2024-09-25 PROCEDURE — 71275 CT ANGIOGRAPHY CHEST: CPT

## 2024-09-25 PROCEDURE — 0 TECHNETIUM TETROFOSMIN KIT: Performed by: EMERGENCY MEDICINE

## 2024-09-25 PROCEDURE — 25010000002 FUROSEMIDE PER 20 MG: Performed by: PHYSICIAN ASSISTANT

## 2024-09-25 PROCEDURE — 85027 COMPLETE CBC AUTOMATED: CPT | Performed by: PHYSICIAN ASSISTANT

## 2024-09-25 PROCEDURE — 83735 ASSAY OF MAGNESIUM: CPT | Performed by: PHYSICIAN ASSISTANT

## 2024-09-25 PROCEDURE — 25510000001 IOPAMIDOL PER 1 ML: Performed by: EMERGENCY MEDICINE

## 2024-09-25 PROCEDURE — 93018 CV STRESS TEST I&R ONLY: CPT | Performed by: INTERNAL MEDICINE

## 2024-09-25 PROCEDURE — 93306 TTE W/DOPPLER COMPLETE: CPT | Performed by: INTERNAL MEDICINE

## 2024-09-25 RX ORDER — PANTOPRAZOLE SODIUM 40 MG/1
40 TABLET, DELAYED RELEASE ORAL DAILY
Status: DISCONTINUED | OUTPATIENT
Start: 2024-09-25 | End: 2024-09-26 | Stop reason: HOSPADM

## 2024-09-25 RX ORDER — SODIUM CHLORIDE 0.9 % (FLUSH) 0.9 %
10 SYRINGE (ML) INJECTION EVERY 12 HOURS SCHEDULED
Status: DISCONTINUED | OUTPATIENT
Start: 2024-09-25 | End: 2024-09-26 | Stop reason: HOSPADM

## 2024-09-25 RX ORDER — MONTELUKAST SODIUM 10 MG/1
10 TABLET ORAL DAILY
Status: DISCONTINUED | OUTPATIENT
Start: 2024-09-25 | End: 2024-09-26 | Stop reason: HOSPADM

## 2024-09-25 RX ORDER — IOPAMIDOL 755 MG/ML
100 INJECTION, SOLUTION INTRAVASCULAR
Status: COMPLETED | OUTPATIENT
Start: 2024-09-25 | End: 2024-09-25

## 2024-09-25 RX ORDER — SODIUM CHLORIDE 9 MG/ML
40 INJECTION, SOLUTION INTRAVENOUS AS NEEDED
Status: DISCONTINUED | OUTPATIENT
Start: 2024-09-25 | End: 2024-09-26 | Stop reason: HOSPADM

## 2024-09-25 RX ORDER — ATORVASTATIN CALCIUM 20 MG/1
40 TABLET, FILM COATED ORAL DAILY
Status: DISCONTINUED | OUTPATIENT
Start: 2024-09-25 | End: 2024-09-26 | Stop reason: HOSPADM

## 2024-09-25 RX ORDER — LOSARTAN POTASSIUM 100 MG/1
100 TABLET ORAL DAILY
Status: DISCONTINUED | OUTPATIENT
Start: 2024-09-25 | End: 2024-09-26 | Stop reason: HOSPADM

## 2024-09-25 RX ORDER — POTASSIUM CHLORIDE 750 MG/1
40 TABLET, FILM COATED, EXTENDED RELEASE ORAL EVERY 4 HOURS
Status: ACTIVE | OUTPATIENT
Start: 2024-09-25 | End: 2024-09-25

## 2024-09-25 RX ORDER — NITROGLYCERIN 0.4 MG/1
0.4 TABLET SUBLINGUAL
Status: DISCONTINUED | OUTPATIENT
Start: 2024-09-25 | End: 2024-09-26 | Stop reason: HOSPADM

## 2024-09-25 RX ORDER — BISACODYL 5 MG/1
5 TABLET, DELAYED RELEASE ORAL DAILY PRN
Status: DISCONTINUED | OUTPATIENT
Start: 2024-09-25 | End: 2024-09-26 | Stop reason: HOSPADM

## 2024-09-25 RX ORDER — POLYETHYLENE GLYCOL 3350 17 G/17G
17 POWDER, FOR SOLUTION ORAL DAILY PRN
Status: DISCONTINUED | OUTPATIENT
Start: 2024-09-25 | End: 2024-09-26 | Stop reason: HOSPADM

## 2024-09-25 RX ORDER — BUPROPION HYDROCHLORIDE 150 MG/1
150 TABLET ORAL DAILY
Status: DISCONTINUED | OUTPATIENT
Start: 2024-09-25 | End: 2024-09-26 | Stop reason: HOSPADM

## 2024-09-25 RX ORDER — ACETAMINOPHEN 325 MG/1
650 TABLET ORAL EVERY 4 HOURS PRN
Status: DISCONTINUED | OUTPATIENT
Start: 2024-09-25 | End: 2024-09-26 | Stop reason: HOSPADM

## 2024-09-25 RX ORDER — BUPROPION HYDROCHLORIDE 150 MG/1
300 TABLET ORAL DAILY
Status: DISCONTINUED | OUTPATIENT
Start: 2024-09-25 | End: 2024-09-26 | Stop reason: HOSPADM

## 2024-09-25 RX ORDER — CITALOPRAM HYDROBROMIDE 20 MG/1
20 TABLET ORAL DAILY
Status: DISCONTINUED | OUTPATIENT
Start: 2024-09-25 | End: 2024-09-26 | Stop reason: HOSPADM

## 2024-09-25 RX ORDER — AMOXICILLIN 250 MG
2 CAPSULE ORAL 2 TIMES DAILY PRN
Status: DISCONTINUED | OUTPATIENT
Start: 2024-09-25 | End: 2024-09-26 | Stop reason: HOSPADM

## 2024-09-25 RX ORDER — SODIUM CHLORIDE 0.9 % (FLUSH) 0.9 %
10 SYRINGE (ML) INJECTION AS NEEDED
Status: DISCONTINUED | OUTPATIENT
Start: 2024-09-25 | End: 2024-09-26 | Stop reason: HOSPADM

## 2024-09-25 RX ORDER — ONDANSETRON 4 MG/1
4 TABLET, ORALLY DISINTEGRATING ORAL EVERY 6 HOURS PRN
Status: DISCONTINUED | OUTPATIENT
Start: 2024-09-25 | End: 2024-09-26 | Stop reason: HOSPADM

## 2024-09-25 RX ORDER — ONDANSETRON 2 MG/ML
4 INJECTION INTRAMUSCULAR; INTRAVENOUS EVERY 6 HOURS PRN
Status: DISCONTINUED | OUTPATIENT
Start: 2024-09-25 | End: 2024-09-26 | Stop reason: HOSPADM

## 2024-09-25 RX ORDER — REGADENOSON 0.08 MG/ML
0.4 INJECTION, SOLUTION INTRAVENOUS
Status: COMPLETED | OUTPATIENT
Start: 2024-09-25 | End: 2024-09-25

## 2024-09-25 RX ORDER — FUROSEMIDE 10 MG/ML
40 INJECTION INTRAMUSCULAR; INTRAVENOUS EVERY 12 HOURS
Status: DISCONTINUED | OUTPATIENT
Start: 2024-09-25 | End: 2024-09-26

## 2024-09-25 RX ORDER — AMITRIPTYLINE HYDROCHLORIDE 10 MG/1
10 TABLET ORAL NIGHTLY
Status: DISCONTINUED | OUTPATIENT
Start: 2024-09-25 | End: 2024-09-26 | Stop reason: HOSPADM

## 2024-09-25 RX ORDER — BISACODYL 10 MG
10 SUPPOSITORY, RECTAL RECTAL DAILY PRN
Status: DISCONTINUED | OUTPATIENT
Start: 2024-09-25 | End: 2024-09-26 | Stop reason: HOSPADM

## 2024-09-25 RX ORDER — ASPIRIN 81 MG/1
81 TABLET ORAL DAILY
Status: DISCONTINUED | OUTPATIENT
Start: 2024-09-25 | End: 2024-09-26 | Stop reason: HOSPADM

## 2024-09-25 RX ADMIN — TETROFOSMIN 1 DOSE: 1.38 INJECTION, POWDER, LYOPHILIZED, FOR SOLUTION INTRAVENOUS at 12:08

## 2024-09-25 RX ADMIN — Medication 10 ML: at 00:46

## 2024-09-25 RX ADMIN — PANTOPRAZOLE SODIUM 40 MG: 40 TABLET, DELAYED RELEASE ORAL at 11:52

## 2024-09-25 RX ADMIN — ATORVASTATIN CALCIUM 40 MG: 20 TABLET, FILM COATED ORAL at 20:23

## 2024-09-25 RX ADMIN — CITALOPRAM 20 MG: 20 TABLET, FILM COATED ORAL at 11:52

## 2024-09-25 RX ADMIN — Medication 10 ML: at 20:29

## 2024-09-25 RX ADMIN — ASPIRIN 81 MG: 81 TABLET, COATED ORAL at 11:51

## 2024-09-25 RX ADMIN — FUROSEMIDE 40 MG: 10 INJECTION, SOLUTION INTRAMUSCULAR; INTRAVENOUS at 11:52

## 2024-09-25 RX ADMIN — BUPROPION HYDROCHLORIDE 150 MG: 150 TABLET, EXTENDED RELEASE ORAL at 11:51

## 2024-09-25 RX ADMIN — POTASSIUM CHLORIDE 40 MEQ: 750 TABLET, EXTENDED RELEASE ORAL at 11:51

## 2024-09-25 RX ADMIN — AMITRIPTYLINE HYDROCHLORIDE 10 MG: 10 TABLET, FILM COATED ORAL at 20:23

## 2024-09-25 RX ADMIN — PERFLUTREN 2 ML: 6.52 INJECTION, SUSPENSION INTRAVENOUS at 09:56

## 2024-09-25 RX ADMIN — FUROSEMIDE 40 MG: 10 INJECTION, SOLUTION INTRAMUSCULAR; INTRAVENOUS at 20:23

## 2024-09-25 RX ADMIN — MONTELUKAST SODIUM 10 MG: 10 TABLET, FILM COATED ORAL at 11:51

## 2024-09-25 RX ADMIN — LOSARTAN POTASSIUM 100 MG: 100 TABLET, FILM COATED ORAL at 11:51

## 2024-09-25 RX ADMIN — Medication 10 ML: at 11:52

## 2024-09-25 RX ADMIN — TETROFOSMIN 1 DOSE: 1.38 INJECTION, POWDER, LYOPHILIZED, FOR SOLUTION INTRAVENOUS at 13:15

## 2024-09-25 RX ADMIN — REGADENOSON 0.4 MG: 0.08 INJECTION, SOLUTION INTRAVENOUS at 13:15

## 2024-09-25 RX ADMIN — IOPAMIDOL 95 ML: 755 INJECTION, SOLUTION INTRAVENOUS at 14:15

## 2024-09-25 NOTE — PLAN OF CARE
Goal Outcome Evaluation:      Pt admitted to observation for further evaluation of her exertional shortness of breath. Lasix given in ED. Strict I/Os monitored. Echo ordered. Cardiology consult. Pt placed on 2L oxygen while sleeping.     Problem: Adult Inpatient Plan of Care  Goal: Absence of Hospital-Acquired Illness or Injury  Intervention: Identify and Manage Fall Risk  Recent Flowsheet Documentation  Taken 9/25/2024 0451 by Tej Christy RN  Safety Promotion/Fall Prevention: safety round/check completed  Taken 9/25/2024 0230 by Tej Christy RN  Safety Promotion/Fall Prevention: safety round/check completed  Taken 9/25/2024 0117 by Tej Christy RN  Safety Promotion/Fall Prevention:   safety round/check completed   room organization consistent   clutter free environment maintained   assistive device/personal items within reach  Intervention: Prevent Skin Injury  Recent Flowsheet Documentation  Taken 9/25/2024 0117 by Tej Christy RN  Body Position: position changed independently  Intervention: Prevent and Manage VTE (Venous Thromboembolism) Risk  Recent Flowsheet Documentation  Taken 9/25/2024 0117 by Tej Christy RN  Activity Management: ambulated to bathroom  Goal: Optimal Comfort and Wellbeing  Intervention: Provide Person-Centered Care  Recent Flowsheet Documentation  Taken 9/25/2024 0117 by Tej Christy RN  Trust Relationship/Rapport:   care explained   choices provided  Goal: Readiness for Transition of Care  Intervention: Mutually Develop Transition Plan  Recent Flowsheet Documentation  Taken 9/25/2024 0120 by Tej Christy RN  Transportation Anticipated: family or friend will provide  Patient/Family Anticipated Services at Transition: none  Patient/Family Anticipates Transition to: home with family  Taken 9/25/2024 0117 by Tej Christy RN  Equipment Currently Used at Home: none

## 2024-09-25 NOTE — CONSULTS
Patient Identification:  Bella Dukes  79 y.o.  female  1945  [id number]          LOS 0    Requesting physician:   Mani Menendez MD    Reason for Consultation:    Pulmonary nodules, interstitial lung disease    History of Present Illness:   79-year-old female with a history of hypertension, depression, gastroesophageal reflux disease, hyperlipidemia, anxiety who presented to the hospital with shortness of breath.    Patient presented to the hospital with shortness of breath which had been ongoing for the past several months and progressively getting worse.  She ultimately yesterday after her podiatrist appointment decided to call her PCP for evaluation and was instructed to present to the emergency department.  Subsequently was transferred to [facility] where chest x-ray demonstrated cardiomegaly with vascular congestion.  Was given IV Lasix.  On evaluation of patient today, states that she feels significantly better after diuresis.  Was having some lower extremity edema and just generalized fatigue and weakness for the past several weeks.  Denies any history of asthma or COPD.  Was a former smoker, quit back in 1970 and smoked for less than 10 pack years.  Does not use any bronchodilators at home.  Has never been told that she has interstitial lung disease or pulmonary nodule, states that if she had known she would have followed up.    Past Medical History:  Past Medical History:   Diagnosis Date    Allergic rhinitis     Allergy     Angina pectoris     Anxiety     Arthritis     Asthma     Carpal tunnel syndrome     Chafing     Chronic lower back pain     Constipation     Depression     Former smoker     GERD (gastroesophageal reflux disease)     Hemorrhoids     High blood pressure     High cholesterol     Hoarseness     Hypertension     Joint pain     both hips    Knee pain     soft tissue    Migraine headache     Migraines     Morbid obesity     Neck pain     Obesity     Other  urinary problems     temporary urinary loss of control with cough and/or sneeze    Palpitations     Plantar fasciitis     Pneumonia     Seasonal allergies     Sleep apnea        Past Surgical History:  Past Surgical History:   Procedure Laterality Date    BARIATRIC SURGERY      COLONOSCOPY  11/18/2013    HYSTERECTOMY      Not due to cancer    KNEE SURGERY Right     LAPAROSCOPIC GASTRIC BANDING      REPLACEMENT TOTAL KNEE      TONSILLECTOMY          Home Meds:  Medications Prior to Admission   Medication Sig Dispense Refill Last Dose    amitriptyline (ELAVIL) 10 MG tablet TAKE 1 TABLET BY MOUTH AT BEDTIME 90 tablet 0     aspirin 81 MG tablet Take by mouth daily.       atorvastatin (LIPITOR) 40 MG tablet TAKE 1 TABLET BY MOUTH EVERY DAY 90 tablet 0     buPROPion XL (WELLBUTRIN XL) 150 MG 24 hr tablet TAKE 1 TABLET BY MOUTH EVERY DAY 90 tablet 0     buPROPion XL (WELLBUTRIN XL) 300 MG 24 hr tablet TAKE 1 TABLET BY MOUTH EVERY DAY 90 tablet 0     cetirizine (ZyrTEC) 10 MG tablet Take 1 tablet by mouth Daily.       citalopram (CeleXA) 40 MG tablet TAKE 1 TABLET BY MOUTH EVERY DAY 90 tablet 0     Cyanocobalamin (VITAMIN B12 PO) Take 1 tablet by mouth daily.       losartan (COZAAR) 100 MG tablet TAKE 1 TABLET BY MOUTH EVERY DAY 90 tablet 0     meloxicam (MOBIC) 15 MG tablet TAKE 1 TABLET BY MOUTH EVERY DAY 90 tablet 0     montelukast (SINGULAIR) 10 MG tablet TAKE 1 TABLET BY MOUTH EVERY DAY 90 tablet 0     pantoprazole (PROTONIX) 40 MG EC tablet TAKE 1 TABLET BY MOUTH EVERY DAY 90 tablet 3     Pyridoxine HCl (VITAMIN B-6 PO) Take by mouth.       Folic Acid-Vit B6-Vit B12 (FA-VITAMIN B-6-VITAMIN B-12) 2.2-25-0.5 MG tablet Take  by mouth.       hydroCHLOROthiazide (HYDRODIURIL) 25 MG tablet Take 1 tablet by mouth Daily. 30 tablet 3     MULTIPLE VITAMINS-MINERALS-FA PO Take  by mouth.       multivitamin (THERAGRAN) tablet tablet Take 1 tablet by mouth Daily.       potassium chloride (K-DUR,KLOR-CON) 20 MEQ CR tablet Take 1  "tablet by mouth 2 (Two) Times a Day. (Patient not taking: Reported on 6/21/2024) 60 tablet 0     tolnaftate (Tinactin) 1 % cream Apply 1 application topically to the appropriate area as directed 2 (Two) Times a Day. 113 g 6     vitamin D (ERGOCALCIFEROL) 1.25 MG (77185 UT) capsule capsule Take 1 capsule by mouth 1 (One) Time Per Week. 5 capsule 5          Allergies:  Allergies   Allergen Reactions    Epinephrine Other (See Comments)     With Dental procedures - passes out       Social History:   Social History     Socioeconomic History    Marital status:    Tobacco Use    Smoking status: Former    Smokeless tobacco: Never    Tobacco comments:     smoked less than 1 pack per day for 5 years   Vaping Use    Vaping status: Never Used   Substance and Sexual Activity    Alcohol use: No    Drug use: No    Sexual activity: Defer       Family History:  Family History   Problem Relation Age of Onset    Stroke Mother         ischemic    Arthritis Mother     Stroke Father         ischemic    Arthritis Father     Liver cancer Father     Stroke Paternal Grandfather         ischemic    Arthritis Paternal Grandfather     Asthma Brother     Liver cancer Brother     COPD Brother     Arthritis Maternal Grandfather     Arthritis Paternal Grandmother     Arthritis Maternal Grandmother        Review of Systems:  12 point review of systems performed and all else negative except as per HPI above.    Objective:    PHYSICAL EXAM:    /87   Pulse 68   Temp 97.5 °F (36.4 °C) (Oral)   Resp 18   Ht 152.4 cm (60\")   Wt 92.1 kg (203 lb)   SpO2 99%   BMI 39.65 kg/m²  Body mass index is 39.65 kg/m². 99% 92.1 kg (203 lb)    General: Alert, nontoxic, NAD  HEENT: NC/AT, EOMI, MMM  Neck: Supple, trachea midline  Cardiac: RRR, no murmur, gallops, rubs  Pulmonary: Clear to auscultation bilaterally, no adventitious breath sounds, normal respiratory effort  GI: Soft, non-tender, non-distended, normal bowel sounds  Extremities: Warm, well " perfused, no LE edema  Skin: no visible rash  Neuro: CN II - XII grossly intact  Psychiatry: Normal mood and affect    Lab Review:   Results from last 7 days   Lab Units 09/25/24  0410 09/24/24  1705   WBC 10*3/mm3 4.75 5.25   HEMOGLOBIN g/dL 11.7* 12.1   PLATELETS 10*3/mm3 271 275     Results from last 7 days   Lab Units 09/25/24  0410 09/24/24  1705   SODIUM mmol/L 142 140   POTASSIUM mmol/L 3.4* 3.7   CHLORIDE mmol/L 105 102   CO2 mmol/L 25.0 29.3*   BUN mg/dL 9 10   CREATININE mg/dL 0.78 0.85   GLUCOSE mg/dL 85 96   CALCIUM mg/dL 9.5 10.2   MAGNESIUM mg/dL 2.2  --    Estimated Creatinine Clearance: 60.2 mL/min (by C-G formula based on SCr of 0.78 mg/dL).    Results from last 7 days   Lab Units 09/25/24  0410 09/24/24  1705   AST (SGOT) U/L  --  14   ALT (SGPT) U/L  --  11   D DIMER QUANT MCGFEU/mL  --  0.74   PLATELETS 10*3/mm3 271 275              Imaging reviewed  Chest imaging from this hospitalization reviewed.       Assessment / Recommendations:    New onset heart failure with acute exacerbation  Fluid overload state, pulmonary edema  Mild interstitial lung disease  Less than 6 mm pulmonary nodule in right upper lobe  Shortness of breath  Hypertension  Hyperlipidemia  Depression/anxiety    - Patient presented to hospital with worsening shortness of breath over the past several weeks to months.  Found to have new onset heart failure with cardiology workup ongoing.  -Stress test unremarkable, echocardiogram results pending.  -No history of asthma or COPD.  High-resolution CT scan demonstrates some mild UIP pattern interstitial lung disease however changes are very minimal.  Do not believe that these are contributing to her shortness of breath.  -Right upper lobe pulmonary nodule is less than 6 mm in size, according to Fleischner criteria, does not require follow-up as she is low risk with remote smoking history having quit greater than 50 years ago.  -Ongoing diuresis for fluid overload state  -Remainder of  care as per primary and cardiology  -No pulmonary intervention required    Thank you for allowing me to participate in the care of this patient.  Pulmonary will sign off at this time.  Please contact us if further questions or concerns arise.    Henrry Gaspar MD  [facility], [facility]  Pulmonary and Critical Care Medicine, Interventional Pulmonology    9/25/2024  12:44 EDT    Parts of this note may be an electronic transcription/translation of spoken language to printed text using the Dragon dictation system.

## 2024-09-25 NOTE — CONSULTS
[facility]  Consult Note                                                                              9/25/2024  No Known Provider    Patient Identification:  Bella Dukes:   79 y.o.  female  1945     Date of Admission:9/24/2024    CC: increased generalized weakness     History of Present Illness:   Bella Dukes is a 79 year old pt with a history of hypertension, hyperlipidemia, ILD, mild aortic root dilation of 3.8 cm, asthma, morbid obesity status post lap band and BALWINDER. Patient was seen by Dr. Riddle in 3/2016 for shortness of breath.  Nuclear stress test reportedly negative for ischemia.  There was small pericardial effusion noted by echo 2015.  She was doing well from a cardiac standpoint and felt like her cough was due to reflux and had improved since her Lap-Band fluid was drained completely.  Last echocardiogram 10/2022 performed for dyspnea shows an ejection fraction of 65%, grade 1 diastolic dysfunction mild RV enlargement (chronic) with trivial valve regurgitation and RVSP 14 mmHg.  Ascending aorta dilated to 3.7 cm.  There is no pericardial effusion appreciated.  With persistent dyspnea CT scan of the chest 8/2023 showed changes of possible early pulmonary fibrosis (UIP) as well as a 0.6 cm pulmonary nodule for which repeat CT was recommended in 1 year. I year ago had bilateral edema controlled with HCTZ and lasix around holidays    Patient presented to the [facility] ER on 9/24/2024 with complaints of 2 months of increased generalized weakness, shortness of air and dyspnea on exertion.  Patient also has a productive cough with green sputum.  Patient also has been having left-sided chest pain. This is sharp random chest pain lasting seconds and has happened a few times in the past several months.   She denied fever, chills, wheezing, orthopnea, PND or edema.  In ER, troponin T 15/13, pro , CO2 29.3, TSH 0.328, D dimer negative, COVID and Flu negative, CXR showed cardiomegaly with  vascular congestion EKG showed sinus rhythm rate of 81 no ST elevation.  Patient was given aspirin and Lasix in the ER.     She was hypertensive on arrival and has remained so overnight.  No arrhythmia.    ECHO 11/9/22    Normal left ventricular cavity size noted. Left ventricular wall thickness is consistent with mild concentric hypertrophy. All left ventricular wall segments contract normally. Left ventricular diastolic function is consistent with (grade I) impaired relaxation.    The right ventricular cavity is mildly dilated.    Trace tricuspid valve regurgitation is present. Estimated right ventricular systolic pressure from tricuspid regurgitation is normal (<35 mmHg). Calculated right ventricular systolic pressure from tricuspid regurgitation is 14.2 mmHg.    Borderline dilation of the ascending aorta is present. Ascending aorta = 3.7 cm         Past Medical History:  Past Medical History:   Diagnosis Date    Allergic rhinitis     Allergy     Angina pectoris     Anxiety     Arthritis     Asthma     Carpal tunnel syndrome     Chafing     Chronic lower back pain     Constipation     Depression     Former smoker     GERD (gastroesophageal reflux disease)     Hemorrhoids     High blood pressure     High cholesterol     Hoarseness     Hypertension     Joint pain     both hips    Knee pain     soft tissue    Migraine headache     Migraines     Morbid obesity     Neck pain     Obesity     Other urinary problems     temporary urinary loss of control with cough and/or sneeze    Palpitations     Plantar fasciitis     Pneumonia     Seasonal allergies     Sleep apnea        Past Surgical History:  Past Surgical History:   Procedure Laterality Date    BARIATRIC SURGERY      COLONOSCOPY  11/18/2013    HYSTERECTOMY      Not due to cancer    KNEE SURGERY Right     LAPAROSCOPIC GASTRIC BANDING      REPLACEMENT TOTAL KNEE      TONSILLECTOMY         Allergies:  Allergies   Allergen Reactions    Epinephrine Other (See Comments)      With Dental procedures - passes out       Home Meds:  Medications Prior to Admission   Medication Sig Dispense Refill Last Dose    amitriptyline (ELAVIL) 10 MG tablet TAKE 1 TABLET BY MOUTH AT BEDTIME 90 tablet 0     aspirin 81 MG tablet Take by mouth daily.       atorvastatin (LIPITOR) 40 MG tablet TAKE 1 TABLET BY MOUTH EVERY DAY 90 tablet 0     buPROPion XL (WELLBUTRIN XL) 150 MG 24 hr tablet TAKE 1 TABLET BY MOUTH EVERY DAY 90 tablet 0     buPROPion XL (WELLBUTRIN XL) 300 MG 24 hr tablet TAKE 1 TABLET BY MOUTH EVERY DAY 90 tablet 0     cetirizine (ZyrTEC) 10 MG tablet Take 1 tablet by mouth Daily.       citalopram (CeleXA) 40 MG tablet TAKE 1 TABLET BY MOUTH EVERY DAY 90 tablet 0     Cyanocobalamin (VITAMIN B12 PO) Take 1 tablet by mouth daily.       losartan (COZAAR) 100 MG tablet TAKE 1 TABLET BY MOUTH EVERY DAY 90 tablet 0     meloxicam (MOBIC) 15 MG tablet TAKE 1 TABLET BY MOUTH EVERY DAY 90 tablet 0     montelukast (SINGULAIR) 10 MG tablet TAKE 1 TABLET BY MOUTH EVERY DAY 90 tablet 0     pantoprazole (PROTONIX) 40 MG EC tablet TAKE 1 TABLET BY MOUTH EVERY DAY 90 tablet 3     Pyridoxine HCl (VITAMIN B-6 PO) Take by mouth.       Folic Acid-Vit B6-Vit B12 (FA-VITAMIN B-6-VITAMIN B-12) 2.2-25-0.5 MG tablet Take  by mouth.       hydroCHLOROthiazide (HYDRODIURIL) 25 MG tablet Take 1 tablet by mouth Daily. 30 tablet 3     MULTIPLE VITAMINS-MINERALS-FA PO Take  by mouth.       multivitamin (THERAGRAN) tablet tablet Take 1 tablet by mouth Daily.       potassium chloride (K-DUR,KLOR-CON) 20 MEQ CR tablet Take 1 tablet by mouth 2 (Two) Times a Day. (Patient not taking: Reported on 6/21/2024) 60 tablet 0     tolnaftate (Tinactin) 1 % cream Apply 1 application topically to the appropriate area as directed 2 (Two) Times a Day. 113 g 6     vitamin D (ERGOCALCIFEROL) 1.25 MG (24568 UT) capsule capsule Take 1 capsule by mouth 1 (One) Time Per Week. 5 capsule 5        Current Meds  Scheduled Meds:amitriptyline, 10 mg,  Oral, Nightly  aspirin, 81 mg, Oral, Daily  atorvastatin, 40 mg, Oral, Daily  buPROPion XL, 150 mg, Oral, Daily  buPROPion XL, 300 mg, Oral, Daily  citalopram, 20 mg, Oral, Daily  furosemide, 40 mg, Intravenous, Q12H  losartan, 100 mg, Oral, Daily  montelukast, 10 mg, Oral, Daily  pantoprazole, 40 mg, Oral, Daily  potassium chloride ER, 40 mEq, Oral, Q4H  sodium chloride, 10 mL, Intravenous, Q12H        Social History:   Social History     Socioeconomic History    Marital status:    Tobacco Use    Smoking status: Former    Smokeless tobacco: Never    Tobacco comments:     smoked less than 1 pack per day for 5 years   Vaping Use    Vaping status: Never Used   Substance and Sexual Activity    Alcohol use: No    Drug use: No    Sexual activity: Defer       Family History:  Family History   Problem Relation Age of Onset    Stroke Mother         ischemic    Arthritis Mother     Stroke Father         ischemic    Arthritis Father     Liver cancer Father     Stroke Paternal Grandfather         ischemic    Arthritis Paternal Grandfather     Asthma Brother     Liver cancer Brother     COPD Brother     Arthritis Maternal Grandfather     Arthritis Paternal Grandmother     Arthritis Maternal Grandmother        REVIEW OF SYSTEMS:   CONSTITUTIONAL: No weight loss, fever, chills  HEENT: Eyes: No visual loss, blurred vision, double vision or yellow sclerae. Ears, Nose, Throat: No hearing loss, sneezing, congestion, runny nose or sore throat.   SKIN: No rash or itching.     RESPIRATORY: No hemoptysis  or sputum.   GASTROINTESTINAL: No anorexia, nausea, vomiting or diarrhea. No abdominal pain, bright red blood per rectum or melena.  GENITOURINARY: No burning on urination, hematuria or increased frequency.  NEUROLOGICAL: No headache, dizziness, syncope, paralysis, ataxia, numbness or tingling in the extremities. No change in bowel or bladder control.   MUSCULOSKELETAL: No muscle, back pain, joint pain or stiffness.  "  HEMATOLOGIC: No anemia, bleeding or bruising.   LYMPHATICS: No enlarged nodes. No history of splenectomy.   PSYCHIATRIC: No history of depression, anxiety, hallucinations.   ENDOCRINOLOGIC: No reports of sweating, cold or heat intolerance. No polyuria or polydipsia.     Physical Exam    /87 (BP Location: Right arm, Patient Position: Lying)   Pulse 68   Temp 97.5 °F (36.4 °C) (Oral)   Resp 18   Ht 152.4 cm (60\")   Wt 92.2 kg (203 lb 3.2 oz)   SpO2 99%   BMI 39.68 kg/m²     General Appearance Well developed, cooperative and well nourished and no acute distress   Head Normocephalic, without abnormality, atraumatic   Ears Ears appear intact with no abnormalities noted   Throat No oral lesions, no thrush, oral mucosa moist   Neck No adenopathy, supple, trachea midline, no thyromegaly, no carotid bruit, no JVD   Back No skin lesions, erythema or scars, no tenderness to percussion or palpation,range of motion is normal   Lungs Clear to auscultation,respirations regular, even and unlabored   Heart Regular rhythm and normal rate, normal S1 and S2, no murmur, no gallop, no rub, no click   Chest wall No abnormalities observed   Abdomen Normal bowel sounds, no masses, no hepatomegaly,    Extremities Moves all extremities well, no edema, no cyanosis, no redness   Pulses Pulses palpable and equal bilaterally. Normal radial, carotid, femoral, dorsalis pedis and posterior tibial pulses bilaterally. Normal abdominal aorta   Skin No bleeding, bruising or rash   Psychiatric Alert and oriented x 3, normal mood and affect     Results from last 7 days   Lab Units 09/25/24  0410 09/24/24  1705   SODIUM mmol/L 142 140   POTASSIUM mmol/L 3.4* 3.7   CHLORIDE mmol/L 105 102   CO2 mmol/L 25.0 29.3*   BUN mg/dL 9 10   CREATININE mg/dL 0.78 0.85   CALCIUM mg/dL 9.5 10.2   BILIRUBIN mg/dL  --  0.3   ALK PHOS U/L  --  93   ALT (SGPT) U/L  --  11   AST (SGOT) U/L  --  14   GLUCOSE mg/dL 85 96     Results from last 7 days   Lab Units " 09/25/24  0410 09/24/24  1705   HSTROP T ng/L 13  13 15*     Results from last 7 days   Lab Units 09/25/24  0410 09/24/24  1705   WBC 10*3/mm3 4.75 5.25   HEMOGLOBIN g/dL 11.7* 12.1   HEMATOCRIT % 35.6 37.3   PLATELETS 10*3/mm3 271 275     Results from last 7 days   Lab Units 09/24/24  1917   INR  1.00     Results from last 7 days   Lab Units 09/25/24  0410   MAGNESIUM mg/dL 2.2     Results from last 7 days   Lab Units 09/24/24  1705   PROBNP pg/mL 366.1     Results from last 7 days   Lab Units 09/25/24  0410   TSH uIU/mL 0.328                 I personally viewed and interpreted the patient's EKG/Telemetry data  I have reviewed HPI and ROS above.    Assessment and Plan    1.  Progressive dyspnea.  Possibly due to heart failure though also has prior CT scan suggestive of UIP.  proBNP also normal though had modest response to diuretics.   Check an echocardiogram and consider CTA for pulmonary emboli, fibrosis and aortic aneurysm assessment.  Depending on these results can consider further functional assessment for coronary artery disease.  2.  Abnormal CT scan with fibrotic changes and pulmonary nodule.  As above  3.  Cardiomegaly.  Check echo as above  4.  Obstructive sleep apnea, untreated  5.  Dilated ascending aorta, verified by CT.  Prior CT reports did not comment on this  6.  Hypertension.  Only on hydrochlorothiazide at home.  May need to titrate antihypertensives and with questionable aneurysmal disease consider low-dose beta-blocker therapy.  Has not been checking it home and more stress lately.   7.  Dyslipidemia  8.  Cough, on losartan.  After above testing has been addressed and blood pressure controlled can consider trial off losartan.  9.  S/p lap band surgery  10.   Thyroid nodules.  11.   Obesity    Addendum: I reviewed echocardiogram with patient while in the room with her.      Monalisa Weinberg  9/25/2024  08:42 EDT    60min spent in reviewing records, discussion and examination of the patient and  discussion with other members of the patient's medical team.     Dictated utilizing Dragon dictation

## 2024-09-25 NOTE — H&P
[facility]   HISTORY AND PHYSICAL    Patient Name: Bella Dukes  : 1945  MRN: [id number]  Primary Care Physician:  Merari Joshua MD  Date of admission: 2024    Subjective   Subjective     Chief Complaint:   Chief Complaint   Patient presents with    Shortness of Breath     PT C/O INCREASING SOA STARTING OVER THE PAST SEVERAL MONTHS AND STEADILY INCREASING; PT STATES SHE GOOGLED HER SYMPTOMS WHICH TOLD HER SHE MAY HAVE CHF         HPI:    Bella Dukes is a 79 y.o. female comes in complaining of shortness of breath.  Patient states that she has been experiencing some shortness of breath over the past few months and worse over the past 2 weeks.  Patient states she googled her symptoms and was worried she has CHF.  Patient states she went to her podiatrist office today and was referred to the ER for leg swelling and her shortness of breath.  Patient states she has a chronic cough as well that is productive.  Patient denies any fever, chills, wheezing, abdominal pain, vomiting or diarrhea.  Patient does report some left-sided chest pain that will come and go under her left breast.  Patient states that this is nonexertional and not better or worse with deep breathing.  Patient denies any pain currently.  Patient is a former smoker.  Patient denies following with a cardiologist for any reason.  Patient speaking in complete sentences at this time and in no acute distress.    In the ED, Initial troponin 15, proBNP normal.  CMP largely unremarkable for acute findings.  D-dimer negative.  Coags obtained.  CBC largely unremarkable for acute findings.  COVID and flu swab negative.  Chest x-ray shows cardiomegaly with vascular congestion, without edema.  EKG shows sinus rhythm 81 bpm, no ST elevation apparent.  Patient was given full dose aspirin and IV Lasix 40 mg.  Patient is afebrile, pulse in the 70s, on room air oxygen 96% SpO2 and blood pressure 170s over 104.      Review of Systems   All systems  were reviewed and negative except for: as per HPI    Personal History     Past Medical History:   Diagnosis Date    Allergic rhinitis     Allergy     Angina pectoris     Anxiety     Arthritis     Asthma     Carpal tunnel syndrome     Chafing     Chronic lower back pain     Constipation     Depression     Former smoker     GERD (gastroesophageal reflux disease)     Hemorrhoids     High blood pressure     High cholesterol     Hoarseness     Hypertension     Joint pain     both hips    Knee pain     soft tissue    Migraine headache     Migraines     Morbid obesity     Neck pain     Obesity     Other urinary problems     temporary urinary loss of control with cough and/or sneeze    Palpitations     Plantar fasciitis     Pneumonia     Seasonal allergies     Sleep apnea        Past Surgical History:   Procedure Laterality Date    BARIATRIC SURGERY      COLONOSCOPY  11/18/2013    HYSTERECTOMY      Not due to cancer    KNEE SURGERY Right     LAPAROSCOPIC GASTRIC BANDING      REPLACEMENT TOTAL KNEE      TONSILLECTOMY         Family History: family history includes Arthritis in her father, maternal grandfather, maternal grandmother, mother, paternal grandfather, and paternal grandmother; Asthma in her brother; COPD in her brother; Liver cancer in her brother and father; Stroke in her father, mother, and paternal grandfather. Otherwise pertinent FHx was reviewed and not pertinent to current issue.    Social History:  reports that she has quit smoking. She has never used smokeless tobacco. She reports that she does not drink alcohol and does not use drugs.    Home Medications:  Cyanocobalamin, FA-Vitamin B-6-Vitamin B-12, Multiple Vitamins-Minerals-FA, Pyridoxine HCl, amitriptyline, aspirin, atorvastatin, buPROPion XL, cetirizine, citalopram, furosemide, hydroCHLOROthiazide, losartan, meloxicam, montelukast, multivitamin, pantoprazole, potassium chloride, tolnaftate, and vitamin D    Allergies:  Allergies   Allergen Reactions     Epinephrine Other (See Comments)     With Dental procedures - passes out       Objective   Objective     Vitals:   Temp:  [98 °F (36.7 °C)-98.5 °F (36.9 °C)] 98 °F (36.7 °C)  Heart Rate:  [78-92] 78  Resp:  [16-18] 18  BP: (149-176)/() 170/104  Physical Exam    Constitutional: Awake, alert   Eyes: PERRLA, sclerae anicteric, no conjunctival injection   HENT: NCAT, mucous membranes moist   Neck: Supple, no thyromegaly, no lymphadenopathy, trachea midline   Respiratory: Clear to auscultation bilaterally, nonlabored respirations    Cardiovascular: RRR, no murmurs, rubs, or gallops, palpable pedal pulses bilaterally   Gastrointestinal: Positive bowel sounds, soft, nontender, nondistended   Musculoskeletal: trace bilateral ankle edema, no clubbing or cyanosis to extremities   Psychiatric: Appropriate affect, cooperative   Neurologic: Oriented x 3, strength symmetric in all extremities, Cranial Nerves grossly intact to confrontation, speech clear   Skin: No rashes     Result Review    Result Review:  I have personally reviewed the results from the time of this admission to 9/25/2024 01:12 EDT and agree with these findings:  [x]  Laboratory list / accordion  []  Microbiology  [x]  Radiology  [x]  EKG/Telemetry   []  Cardiology/Vascular   []  Pathology  []  Old records  []  Other:  Most notable findings include: see above      Assessment & Plan   Assessment / Plan     Brief Patient Summary:  Bella Dukes is a 79 y.o. female who comes in complaining of shortness of breath    Active Hospital Problems:  Active Hospital Problems    Diagnosis     **Acute exacerbation of CHF (congestive heart failure)     Dyspnea      Plan:     Shortness of breath  CHF exacerbation, possible new onset  - Initial troponin 15, proBNP normal.   - CMP and CBC largely unremarkable for acute findings.   - D-dimer negative.  Coags obtained.    -COVID and flu swab negative.   - Chest x-ray shows cardiomegaly with vascular congestion, without  edema.    -EKG shows sinus rhythm 81 bpm, no ST elevation apparent.   - Patient was given full dose aspirin and IV Lasix 40 mg.    -Patient is afebrile, pulse in the 70s, on room air oxygen 96% SpO2 and blood pressure 170s over 104.  -Cardiology consult  -Continue IV Lasix 40 mg twice daily  -Check echo  -Repeat troponin, EKG  -Strict I's and O's, daily weights  -Continuous cardiac monitoring  -N.p.o. midnight    Asthma/COPD  -Continue home inhalers    Anxiety/depression  -Continue home Wellbutrin, Celexa    Essential hypertension, chronic, poorly controlled  -Continue home losartan  -Hold home hydrochlorothiazide  -Monitor closely    Hyperlipidemia   -continue home statin    GERD  -Continue home PPI    Noncompliant with home CPAP    Morbid obesity, BMI 40  -Encourage lifestyle modifications      VTE Prophylaxis:  Mechanical VTE prophylaxis orders are present.        CODE STATUS:    Code Status (Patient has no pulse and is not breathing): CPR (Attempt to Resuscitate)  Medical Interventions (Patient has pulse or is breathing): Full Support    Admission Status:  I believe this patient meets observation status.    79 minutes have been spent by [facility] providers in the care of this patient while under observation status.      Appropriate PPE worn during patient encounter.  Hand hygeine performed before and after seeing the patient.      Electronically signed by KRISTI Burroughs, 09/25/24, 12:34 AM EDT.

## 2024-09-25 NOTE — PROGRESS NOTES
ED OBSERVATION PROGRESS/DISCHARGE SUMMARY    Date of Admission: 9/24/2024   LOS: 0 days   PCP: Merari Joshua MD    Final Diagnosis pending      Subjective     Hospital Outcome: Pending    Bella Dukes is a 79 y.o. female comes in complaining of shortness of breath.  Patient states that she has been experiencing some shortness of breath over the past few months and worse over the past 2 weeks.  Patient states she googled her symptoms and was worried she has CHF.  Patient states she went to her podiatrist office today and was referred to the ER for leg swelling and her shortness of breath.  Patient states she has a chronic cough as well that is productive.  Patient denies any fever, chills, wheezing, abdominal pain, vomiting or diarrhea.  Patient does report some left-sided chest pain that will come and go under her left breast.  Patient states that this is nonexertional and not better or worse with deep breathing.  Patient denies any pain currently.  Patient is a former smoker.  Patient denies following with a cardiologist for any reason.  Patient speaking in complete sentences at this time and in no acute distress.     In the ED, Initial troponin 15, proBNP normal.  CMP largely unremarkable for acute findings.  D-dimer negative.  Coags obtained.  CBC largely unremarkable for acute findings.  COVID and flu swab negative.  Chest x-ray shows cardiomegaly with vascular congestion, without edema.  EKG shows sinus rhythm 81 bpm, no ST elevation apparent.  Patient was given full dose aspirin and IV Lasix 40 mg.  Patient is afebrile, pulse in the 70s, on room air oxygen 96% SpO2 and blood pressure 170s over 104.       ROS:  General: no fevers, chills  Respiratory: no cough, dyspnea  Cardiovascular: no chest pain, palpitations  Abdomen: No abdominal pain, nausea, vomiting, or diarrhea  Neurologic: No focal weakness    9/25/2024.    5:56 PM.  Pulmonology has evaluated patient and feel that being fluid overloaded as a  cause of her shortness of breath.  She did have a 6 mm pulmonary nodule in the right upper lobe but no significant risk factors.  Pulmonology did not feel this needed further follow-up per Fleischner criteria.  They are signing off.  Cardiology has evaluated and a stress test was ordered which was read as a low risk study.  A new echocardiogram was ordered but is yet to be read.  Continue diuresis for now.  Cardiology continues to follow.    Objective   Physical Exam:  I have reviewed the vital signs.  Temp:  [97.5 °F (36.4 °C)-98.2 °F (36.8 °C)] 98.2 °F (36.8 °C)  Heart Rate:  [67-92] 81  Resp:  [16-18] 18  BP: (149-177)/() 177/84  General Appearance:    Alert, cooperative, no distress  Head:    Normocephalic, atraumatic  Eyes:    Sclerae anicteric  Neck:   Supple, no mass  Lungs: Clear to auscultation bilaterally, respirations unlabored  Heart: Regular rate and rhythm, S1 and S2 normal, no murmur, rub or gallop  Abdomen:  Soft, nontender, bowel sounds active, nondistended  Extremities: No clubbing, cyanosis, or edema to lower extremities  Pulses:  2+ and symmetric in distal lower extremities  Skin: No rashes   Neurologic: Oriented x3, Normal strength to extremities    Results Review:    I have reviewed the labs, radiology results and diagnostic studies.    Results from last 7 days   Lab Units 09/25/24  0410   WBC 10*3/mm3 4.75   HEMOGLOBIN g/dL 11.7*   HEMATOCRIT % 35.6   PLATELETS 10*3/mm3 271     Results from last 7 days   Lab Units 09/25/24  1521 09/25/24  0410 09/24/24  1705   SODIUM mmol/L  --  142 140   POTASSIUM mmol/L 3.4* 3.4* 3.7   CHLORIDE mmol/L  --  105 102   CO2 mmol/L  --  25.0 29.3*   BUN mg/dL  --  9 10   CREATININE mg/dL  --  0.78 0.85   CALCIUM mg/dL  --  9.5 10.2   BILIRUBIN mg/dL  --   --  0.3   ALK PHOS U/L  --   --  93   ALT (SGPT) U/L  --   --  11   AST (SGOT) U/L  --   --  14   GLUCOSE mg/dL  --  85 96     Imaging Results (Last 24 Hours)       Procedure Component Value Units  Date/Time    CT Angiogram Chest [[id number]] Collected: 09/25/24 1454     Updated: 09/25/24 1454    Narrative:      CT ANGIOGRAM OF THE CHEST WITH CONTRAST INCLUDING RECONSTRUCTION IMAGES  09/25/2024     HISTORY: Shortness of breath. Dilated aorta.     Following the intravenous contrast injection, CT angiography was  performed through the chest. Sagittal, coronal and 3D reconstruction  images were reviewed.     There is no evidence of pulmonary embolus. The aortic root measures  approximately 3.7 cm in oblique diameter on coronal reconstruction image  65. The ascending aorta measures approximately 3.7 cm in diameter.  Aortic arch measures approximately 3.3 cm. Descending aorta measures  approximately 2.5 cm just above the lien.     Small amount of fluid is seen in pericardial recesses. Small pericardial  effusion is seen.     No lung masses are seen. There is some minimal atelectasis, scar or  inflammatory change in the lingula.     Gastric band is seen.       Impression:      1. No evidence of pulmonary embolus.  2. No significant aortic aneurysm or dissection is seen.  3. Small amount of pericardial effusion.     Radiation dose reduction techniques were utilized, including automated  exposure control and exposure modulation based on body size.                  I have reviewed the medications.  ---------------------------------------------------------------------------------------------  Assessment & Plan   Assessment/Problem List    Acute exacerbation of CHF (congestive heart failure)    Dyspnea      Plan:    Shortness of breath  CHF exacerbation, possible new onset  - Initial troponin 15, proBNP normal.   - CMP and CBC largely unremarkable for acute findings.   - D-dimer negative.  Coags obtained.    -COVID and flu swab negative.   - Chest x-ray shows cardiomegaly with vascular congestion, without edema.    -EKG shows sinus rhythm 81 bpm, no ST elevation apparent.   - Patient was given full dose aspirin and IV  Lasix 40 mg.    -Patient is afebrile, pulse in the 70s, on room air oxygen 96% SpO2 and blood pressure 170s over 104.  -Cardiology consult  -Continue IV Lasix 40 mg twice daily  -Check echo  -Repeat troponin, EKG  -Strict I's and O's, daily weights  -Cardiology continues to follow.  TTE echo still pending at 5:59 PM 9/25/2024.  -Stress test was a low risk study.  -Pulmonology has blessed off on patient, pulmonary care standpoint  -Plan as above     Asthma/COPD  -Continue home inhalers     Anxiety/depression  -Continue home Wellbutrin, Celexa     Essential hypertension, chronic, poorly controlled  -Continue home losartan  -Hold home hydrochlorothiazide  -Monitor closely     Hyperlipidemia   -continue home statin     GERD  -Continue home PPI     Noncompliant with home CPAP     Morbid obesity, BMI 40  -Encourage lifestyle modifications    Disposition: Pending    Follow-up after Discharge: Pending    This note will serve as a progress note    Ottoniel Mayes III, PA 09/25/24 18:00 EDT    I have worn appropriate PPE during this patient encounter, sanitized my hands both with entering and exiting patient's room.      51 minutes has been spent by [facility] providers in the care of this patient while under observation status

## 2024-09-25 NOTE — PLAN OF CARE
Goal Outcome Evaluation:      Patient had echo,stress and CTA done today, awaiting results.VSS at this time. Continuing plan of care.

## 2024-09-25 NOTE — CASE MANAGEMENT/SOCIAL WORK
Discharge Planning Assessment  [facility]     Patient Name: Bella Dukes  MRN: [id number]  Today's Date: 9/25/2024    Admit Date: 9/24/2024        Discharge Needs Assessment       Row Name 09/25/24 1614       Living Environment    People in Home spouse    Name(s) of People in Home Deni Dukes- spouse    Current Living Arrangements home    Potentially Unsafe Housing Conditions none    In the past 12 months has the electric, gas, oil, or water company threatened to shut off services in your home? No    Primary Care Provided by self    Provides Primary Care For no one, unable/limited ability to care for self    Family Caregiver if Needed spouse    Quality of Family Relationships helpful;supportive    Able to Return to Prior Arrangements yes       Resource/Environmental Concerns    Resource/Environmental Concerns none       Transportation Needs    In the past 12 months, has lack of transportation kept you from medical appointments or from getting medications? no    In the past 12 months, has lack of transportation kept you from meetings, work, or from getting things needed for daily living? No       Food Insecurity    Within the past 12 months, you worried that your food would run out before you got the money to buy more. Never true    Within the past 12 months, the food you bought just didn't last and you didn't have money to get more. Never true       Transition Planning    Patient/Family Anticipates Transition to home with family    Patient/Family Anticipated Services at Transition none    Transportation Anticipated family or friend will provide       Discharge Needs Assessment    Equipment Currently Used at Home walker, rolling;bp cuff    Concerns to be Addressed denies needs/concerns at this time;discharge planning    Anticipated Changes Related to Illness none    Equipment Needed After Discharge none    Provided Post Acute Provider List? N/A    Current Discharge Risk chronically ill                   Discharge  Plan       Row Name 09/25/24 1609       Plan    Plan Comments Entered room, introduced self and explained role to patient; verified information on facesheet; patient is retired, lives in a single level home w/basement w/spouse- patient ambulates w/assist of walker and has a B/P cuff in the home- does not navigate steps into the basement anymore; patient is independent w/ADL's, still drives; Verified PCP listed on facesheet; RX are filled at [facility] on [address]; Pt has been to [facility] previously for rehab- Pt presented to the hospital w/complaints of shortness of air; Pt reports feeling better since arrival- plans on returning home upon d/c with spouse to transport.                  Continued Care and Services - Admitted Since 9/24/2024    No active coordination exists for this encounter.          Demographic Summary       Row Name 09/25/24 1613       General Information    Admission Type observation    Arrived From home    Reason for Consult decision-making;discharge planning    Preferred Language English       Contact Information    Permission Granted to Share Info With                    Functional Status       Row Name 09/25/24 1613       Functional Status    Usual Activity Tolerance moderate    Current Activity Tolerance fair       Assessment of Health Literacy    How often do you have someone help you read hospital materials? Never    How often do you have problems learning about your medical condition because of difficulty understanding written information? Occasionally    How often do you have a problem understanding what is told to you about your medical condition? Occasionally    How confident are you filling out medical forms by yourself? Quite a bit    Health Literacy Good       Functional Status, IADL    Medications independent    Meal Preparation independent    Housekeeping independent    Laundry independent    Shopping independent       Mental Status    General Appearance WDL  WDL       Mental Status Summary    Recent Changes in Mental Status/Cognitive Functioning no changes       Employment/    Employment Status retired                   Psychosocial    No documentation.                  Abuse/Neglect    No documentation.                  Legal    No documentation.                  Substance Abuse    No documentation.                  Patient Forms    No documentation.                     Barbara Gallagher RN

## 2024-09-26 ENCOUNTER — NURSE TRIAGE (OUTPATIENT)
Dept: CALL CENTER | Facility: HOSPITAL | Age: 79
End: 2024-09-26
Payer: MEDICARE

## 2024-09-26 ENCOUNTER — READMISSION MANAGEMENT (OUTPATIENT)
Dept: CALL CENTER | Facility: HOSPITAL | Age: 79
End: 2024-09-26
Payer: MEDICARE

## 2024-09-26 VITALS
OXYGEN SATURATION: 97 % | WEIGHT: 203 LBS | HEART RATE: 59 BPM | SYSTOLIC BLOOD PRESSURE: 147 MMHG | BODY MASS INDEX: 39.85 KG/M2 | RESPIRATION RATE: 18 BRPM | DIASTOLIC BLOOD PRESSURE: 67 MMHG | TEMPERATURE: 97.7 F | HEIGHT: 60 IN

## 2024-09-26 LAB
ANION GAP SERPL CALCULATED.3IONS-SCNC: 12 MMOL/L (ref 5–15)
BASOPHILS # BLD AUTO: 0.03 10*3/MM3 (ref 0–0.2)
BASOPHILS NFR BLD AUTO: 0.5 % (ref 0–1.5)
BUN SERPL-MCNC: 14 MG/DL (ref 8–23)
BUN/CREAT SERPL: 14.7 (ref 7–25)
CALCIUM SPEC-SCNC: 9.5 MG/DL (ref 8.6–10.5)
CHLORIDE SERPL-SCNC: 101 MMOL/L (ref 98–107)
CO2 SERPL-SCNC: 26 MMOL/L (ref 22–29)
CREAT SERPL-MCNC: 0.95 MG/DL (ref 0.57–1)
DEPRECATED RDW RBC AUTO: 47.9 FL (ref 37–54)
EGFRCR SERPLBLD CKD-EPI 2021: 61.5 ML/MIN/1.73
EOSINOPHIL # BLD AUTO: 0 10*3/MM3 (ref 0–0.4)
EOSINOPHIL NFR BLD AUTO: 0 % (ref 0.3–6.2)
ERYTHROCYTE [DISTWIDTH] IN BLOOD BY AUTOMATED COUNT: 12.7 % (ref 12.3–15.4)
GLUCOSE SERPL-MCNC: 92 MG/DL (ref 65–99)
HCT VFR BLD AUTO: 36.2 % (ref 34–46.6)
HGB BLD-MCNC: 11.9 G/DL (ref 12–15.9)
IMM GRANULOCYTES # BLD AUTO: 0.01 10*3/MM3 (ref 0–0.05)
IMM GRANULOCYTES NFR BLD AUTO: 0.2 % (ref 0–0.5)
LYMPHOCYTES # BLD AUTO: 1.61 10*3/MM3 (ref 0.7–3.1)
LYMPHOCYTES NFR BLD AUTO: 28.1 % (ref 19.6–45.3)
MCH RBC QN AUTO: 33.4 PG (ref 26.6–33)
MCHC RBC AUTO-ENTMCNC: 32.9 G/DL (ref 31.5–35.7)
MCV RBC AUTO: 101.7 FL (ref 79–97)
MONOCYTES # BLD AUTO: 0.55 10*3/MM3 (ref 0.1–0.9)
MONOCYTES NFR BLD AUTO: 9.6 % (ref 5–12)
NEUTROPHILS NFR BLD AUTO: 3.52 10*3/MM3 (ref 1.7–7)
NEUTROPHILS NFR BLD AUTO: 61.6 % (ref 42.7–76)
NRBC BLD AUTO-RTO: 0 /100 WBC (ref 0–0.2)
PLATELET # BLD AUTO: 262 10*3/MM3 (ref 140–450)
PMV BLD AUTO: 9.6 FL (ref 6–12)
POTASSIUM SERPL-SCNC: 3.3 MMOL/L (ref 3.5–5.2)
QT INTERVAL: 551 MS
QTC INTERVAL: 599 MS
RBC # BLD AUTO: 3.56 10*6/MM3 (ref 3.77–5.28)
SODIUM SERPL-SCNC: 139 MMOL/L (ref 136–145)
WBC NRBC COR # BLD AUTO: 5.72 10*3/MM3 (ref 3.4–10.8)

## 2024-09-26 PROCEDURE — 85025 COMPLETE CBC W/AUTO DIFF WBC: CPT | Performed by: PHYSICIAN ASSISTANT

## 2024-09-26 PROCEDURE — G0378 HOSPITAL OBSERVATION PER HR: HCPCS

## 2024-09-26 PROCEDURE — 80048 BASIC METABOLIC PNL TOTAL CA: CPT | Performed by: PHYSICIAN ASSISTANT

## 2024-09-26 PROCEDURE — 96376 TX/PRO/DX INJ SAME DRUG ADON: CPT

## 2024-09-26 PROCEDURE — 99214 OFFICE O/P EST MOD 30 MIN: CPT

## 2024-09-26 PROCEDURE — 93010 ELECTROCARDIOGRAM REPORT: CPT | Performed by: INTERNAL MEDICINE

## 2024-09-26 PROCEDURE — 93005 ELECTROCARDIOGRAM TRACING: CPT | Performed by: PHYSICIAN ASSISTANT

## 2024-09-26 PROCEDURE — 25010000002 FUROSEMIDE PER 20 MG: Performed by: PHYSICIAN ASSISTANT

## 2024-09-26 RX ORDER — METOPROLOL SUCCINATE 25 MG/1
25 TABLET, EXTENDED RELEASE ORAL
Qty: 30 TABLET | Refills: 0 | Status: SHIPPED | OUTPATIENT
Start: 2024-09-27

## 2024-09-26 RX ORDER — POTASSIUM CHLORIDE 750 MG/1
40 TABLET, FILM COATED, EXTENDED RELEASE ORAL EVERY 4 HOURS
Status: DISCONTINUED | OUTPATIENT
Start: 2024-09-26 | End: 2024-09-26

## 2024-09-26 RX ORDER — POTASSIUM CHLORIDE 1.5 G/1.58G
40 POWDER, FOR SOLUTION ORAL EVERY 4 HOURS
Status: DISCONTINUED | OUTPATIENT
Start: 2024-09-26 | End: 2024-09-26 | Stop reason: HOSPADM

## 2024-09-26 RX ORDER — METOPROLOL SUCCINATE 25 MG/1
25 TABLET, EXTENDED RELEASE ORAL
Status: DISCONTINUED | OUTPATIENT
Start: 2024-09-26 | End: 2024-09-26 | Stop reason: HOSPADM

## 2024-09-26 RX ADMIN — Medication 10 ML: at 08:17

## 2024-09-26 RX ADMIN — BUPROPION HYDROCHLORIDE 150 MG: 150 TABLET, EXTENDED RELEASE ORAL at 08:17

## 2024-09-26 RX ADMIN — ASPIRIN 81 MG: 81 TABLET, COATED ORAL at 08:16

## 2024-09-26 RX ADMIN — MONTELUKAST SODIUM 10 MG: 10 TABLET, FILM COATED ORAL at 08:17

## 2024-09-26 RX ADMIN — POTASSIUM CHLORIDE 40 MEQ: 1.5 POWDER, FOR SOLUTION ORAL at 08:16

## 2024-09-26 RX ADMIN — LOSARTAN POTASSIUM 100 MG: 100 TABLET, FILM COATED ORAL at 08:17

## 2024-09-26 RX ADMIN — CITALOPRAM 20 MG: 20 TABLET, FILM COATED ORAL at 08:16

## 2024-09-26 RX ADMIN — PANTOPRAZOLE SODIUM 40 MG: 40 TABLET, DELAYED RELEASE ORAL at 08:16

## 2024-09-26 RX ADMIN — METOPROLOL SUCCINATE 25 MG: 25 TABLET, EXTENDED RELEASE ORAL at 08:16

## 2024-09-26 RX ADMIN — FUROSEMIDE 40 MG: 10 INJECTION, SOLUTION INTRAMUSCULAR; INTRAVENOUS at 08:16

## 2024-09-26 NOTE — PROGRESS NOTES
JACKELIN JOHNSON Attestation Note     I supervised care provided by the midlevel provider. We have discussed this patient's history, physical exam, and treatment plan. I have reviewed the midlevel provider's note and I agree with the midlevel provider's findings and plan of care.   SHARED VISIT: This visit was performed by BOTH a physician and an APC. The substantive portion of the medical decision making was performed by this attesting physician who made or approved the management plan and takes responsibility for patient management. All studies in the APC note (if performed) were independently interpreted by me.   I have personally had a face to face encounter with the patient.   My personal findings are documented below:      Subjective  Pt is a 79 y.o. female admitted from Emergency Department for evaluation and treatment of generalized weakness, dyspnea on exertion.  Patient feeling better after IV diuresis.    Physical Exam  GENERAL: Alert and in NAD, Vitals reviewed-blood pressure 147/67, pulse 70.  HENT: nares patent  EYES: no scleral icterus  CV: regular rhythm, regular rate-no murmur  RESPIRATORY: normal effort, clear to auscultation bilaterally  ABDOMEN: soft, obese-nontender to palpation  MUSCULOSKELETAL: no deformity  NEURO: Strength sensation and coordination are grossly intact.  Speech and mentation are unremarkable  SKIN: warm, dry    Assessment/Plan  I discussed tx and evaluation of this patient with KRISTI Buchanan.  Appreciate consultation from cardiology, Dr. Monalisa Weinberg and pulmonology Dr. Gaspar.  Stress test with low risk study.  Echo showed ejection fraction 70%, no serious valvular disease.  Patient is diuresed with IV medications and is feeling better.  Anticipate discharge later today.

## 2024-09-26 NOTE — DISCHARGE SUMMARY
ED OBSERVATION PROGRESS/DISCHARGE SUMMARY    Date of Admission: 9/24/2024   LOS: 0 days   PCP: Merari Joshua MD    Subjective patient reports feeling significantly improved today compared to admission.  Her shortness of breath has improved.    Hospital Outcome:   Bella Dukes is a 79 y.o. female who presented to the emergency department on 9/24 complaining of progressively worsening shortness of breath.  Comes in complaining of shortness of breath.    In the ED, Initial troponin 15, proBNP normal.  CMP largely unremarkable for acute findings.  D-dimer negative.  Coags obtained.  CBC largely unremarkable for acute findings.  COVID and flu swab negative.  Chest x-ray shows cardiomegaly with vascular congestion, without edema.  EKG shows sinus rhythm 81 bpm, no ST elevation apparent.  Patient was given full dose aspirin and IV Lasix 40 mg.  Patient is afebrile, pulse in the 70s, on room air oxygen 96% SpO2 and blood pressure 170s over 104.    9/25/2024:  5:56 PM.  Pulmonology has evaluated patient and feel that being fluid overloaded as a cause of her shortness of breath.  She did have a 6 mm pulmonary nodule in the right upper lobe but no significant risk factors.  Pulmonology did not feel this needed further follow-up per Fleischner criteria.  They are signing off.  Cardiology has evaluated and a stress test was ordered which was read as a low risk study.  A new echocardiogram was ordered but is yet to be read.  Continue diuresis for now.  Cardiology continues to follow.    9/26/2024: Echocardiogram shows EF of 70.3%, left ventricular wall thickness consistent with mild to moderate concentric hypertrophy, all left ventricular wall segments contract normally, trace to mild aortic valve regurgitation, mild to moderate mitral valve regurgitation, small (less than 1 cm) circumferential pericardial effusion with no evidence of cardiac tamponade, borderline dilatation of the ascending aorta, ascending aorta equals  3.7 cm.    Patient was seen by cardiology again today, CHINO Clifford.  Patient will be discharged on metoprolol 25 mg daily.  Patient to follow-up with her primary cardiologist in 2 weeks.  Discussed with patient who expresses understanding and is in agreement with plan.    ROS:  General: no fevers, chills  Respiratory: no cough, dyspnea  Cardiovascular: no chest pain, palpitations  Abdomen: No abdominal pain, nausea, vomiting, or diarrhea  Neurologic: No focal weakness    Objective   Physical Exam:  I have reviewed the vital signs.  Temp:  [97.5 °F (36.4 °C)-99.1 °F (37.3 °C)] 97.9 °F (36.6 °C)  Heart Rate:  [68-88] 70  Resp:  [18] 18  BP: (131-177)/() 147/67  General Appearance:    Alert, cooperative, no distress  Head:    Normocephalic, atraumatic  Eyes:    Sclerae anicteric  Neck:   Supple, no mass  Lungs: Clear to auscultation bilaterally, respirations unlabored  Heart: Regular rate and rhythm, S1 and S2 normal, no murmur, rub or gallop  Abdomen:  Soft, nontender, bowel sounds active, nondistended  Extremities: No clubbing, cyanosis, or edema to lower extremities  Pulses:  2+ and symmetric in distal lower extremities  Skin: No rashes   Neurologic: Oriented x3, Normal strength to extremities    Results Review:    I have reviewed the labs, radiology results and diagnostic studies.    Results from last 7 days   Lab Units 09/26/24  0323   WBC 10*3/mm3 5.72   HEMOGLOBIN g/dL 11.9*   HEMATOCRIT % 36.2   PLATELETS 10*3/mm3 262     Results from last 7 days   Lab Units 09/26/24  0323 09/25/24  1521 09/25/24  0410 09/24/24  1705   SODIUM mmol/L 139  --  142 140   POTASSIUM mmol/L 3.3* 3.4* 3.4* 3.7   CHLORIDE mmol/L 101  --  105 102   CO2 mmol/L 26.0  --  25.0 29.3*   BUN mg/dL 14  --  9 10   CREATININE mg/dL 0.95  --  0.78 0.85   CALCIUM mg/dL 9.5  --  9.5 10.2   BILIRUBIN mg/dL  --   --   --  0.3   ALK PHOS U/L  --   --   --  93   ALT (SGPT) U/L  --   --   --  11   AST (SGOT) U/L  --   --   --  14   GLUCOSE  mg/dL 92  --  85 96     Imaging Results (Last 24 Hours)       Procedure Component Value Units Date/Time    CT Angiogram Chest [[id number]] Collected: 09/25/24 1454     Updated: 09/25/24 1454    Narrative:      CT ANGIOGRAM OF THE CHEST WITH CONTRAST INCLUDING RECONSTRUCTION IMAGES  09/25/2024     HISTORY: Shortness of breath. Dilated aorta.     Following the intravenous contrast injection, CT angiography was  performed through the chest. Sagittal, coronal and 3D reconstruction  images were reviewed.     There is no evidence of pulmonary embolus. The aortic root measures  approximately 3.7 cm in oblique diameter on coronal reconstruction image  65. The ascending aorta measures approximately 3.7 cm in diameter.  Aortic arch measures approximately 3.3 cm. Descending aorta measures  approximately 2.5 cm just above the lien.     Small amount of fluid is seen in pericardial recesses. Small pericardial  effusion is seen.     No lung masses are seen. There is some minimal atelectasis, scar or  inflammatory change in the lingula.     Gastric band is seen.       Impression:      1. No evidence of pulmonary embolus.  2. No significant aortic aneurysm or dissection is seen.  3. Small amount of pericardial effusion.     Radiation dose reduction techniques were utilized, including automated  exposure control and exposure modulation based on body size.                Echocardiogram 9/25/2024    Left ventricular systolic function is hyperdynamic (EF > 70%). Calculated left ventricular EF = 70.3% Normal left ventricular cavity size noted. Left ventricular wall thickness is consistent with mild to moderate concentric hypertrophy. All left ventricular wall segments contract normally. Left ventricular diastolic function is consistent with (grade I) impaired relaxation.    Trace to mild aortic valve regurgitation is present.    Mild to moderate mitral valve regurgitation is present.    There is a small (<1cm) circumferential  pericardial effusion. There is no evidence of cardiac tamponade. The respiratory variation of mitral valve inflow is less than 30%. The respiratory variation of tricuspid valve inflow is less than 30%.    Borderline dilation of the ascending aorta is present. Ascending aorta = 3.7 cm     Stress test 9/25/2024    Myocardial perfusion imaging indicates a normal myocardial perfusion study with no evidence of ischemia. Impressions are consistent with a low risk study.    Left ventricular ejection fraction is hyperdynamic (Calculated EF > 70%).    I have reviewed the medications.  ---------------------------------------------------------------------------------------------  Assessment & Plan   Assessment/Problem List    Acute exacerbation of CHF (congestive heart failure)    Dyspnea      Plan:  Shortness of breath  CHF exacerbation, possible new onset  - Initial troponin 15, proBNP normal.   - CMP and CBC largely unremarkable for acute findings.   - D-dimer negative.  Coags obtained.    -COVID and flu swab negative.   - Chest x-ray shows cardiomegaly with vascular congestion, without edema.    -EKG shows sinus rhythm 81 bpm, no ST elevation apparent.   - Patient was given full dose aspirin and IV Lasix 40 mg.    -Patient is afebrile, pulse in the 70s, on room air oxygen 96% SpO2 and blood pressure 170s over 104.  -Cardiology consult  -Continue IV Lasix 40 mg twice daily  -Check echo  -Repeat troponin, EKG  -Strict I's and O's, daily weights  -Cardiology continues to follow  -Stress test was a low risk study.  -Pulmonology has seen the patient and have signed off  -Plan as above  -Toprol-XL 25 mg daily on discharge     Asthma/COPD  -Continue home inhalers     Anxiety/depression  -Continue home Wellbutrin, Celexa     Essential hypertension, chronic, poorly controlled  -Continue home losartan  -Hold home hydrochlorothiazide  -Monitor closely     Hyperlipidemia   -continue home statin     GERD  -Continue home PPI      BALWINDRE  -Noncompliant with home CPAP     Morbid obesity, BMI 40  -Encourage lifestyle modifications    Disposition: Home    Follow-up after Discharge: PCP, cardiology    This note will serve as a discharge summary    KRISTI Freeman 09/26/24 08:28 EDT    I have worn appropriate PPE during this patient encounter, sanitized my hands both with entering and exiting patient's room.      30 minutes has been spent by [facility] providers in the care of this patient while under observation status

## 2024-09-26 NOTE — PROGRESS NOTES
LOS: 0 days   Patient Care Team:  Merari Joshua MD as PCP - General (Family Medicine)    Chief Complaint: follow up progressive dyspnea    Interval History: She is feeling significantly better today. She denies chest pain or discomfort, palpitations, or shortness of breath.     Vital Signs:  Temp:  [97.7 °F (36.5 °C)-99.1 °F (37.3 °C)] 97.7 °F (36.5 °C)  Heart Rate:  [59-88] 59  Resp:  [18] 18  BP: (131-177)/() 147/67    Intake/Output Summary (Last 24 hours) at 9/26/2024 0954  Last data filed at 9/25/2024 2138  Gross per 24 hour   Intake --   Output 1000 ml   Net -1000 ml        Physical Exam  Vitals reviewed.   Constitutional:       General: She is not in acute distress.  HENT:      Head: Normocephalic.      Nose: Nose normal.   Eyes:      Extraocular Movements: Extraocular movements intact.      Pupils: Pupils are equal, round, and reactive to light.   Cardiovascular:      Rate and Rhythm: Normal rate and regular rhythm.      Pulses: Normal pulses.      Heart sounds: Normal heart sounds. Heart sounds not distant. No murmur heard.     No friction rub. No gallop. No S3 or S4 sounds.   Pulmonary:      Effort: Pulmonary effort is normal.      Breath sounds: Normal breath sounds.   Abdominal:      General: Abdomen is flat. Bowel sounds are normal.      Palpations: Abdomen is soft.      Tenderness: There is no abdominal tenderness.   Skin:     General: Skin is warm and dry.   Neurological:      General: No focal deficit present.      Mental Status: She is alert and oriented to person, place, and time. Mental status is at baseline.   Psychiatric:         Mood and Affect: Mood normal.         Behavior: Behavior normal.         Results Review:    Results from last 7 days   Lab Units 09/26/24  0323   SODIUM mmol/L 139   POTASSIUM mmol/L 3.3*   CHLORIDE mmol/L 101   CO2 mmol/L 26.0   BUN mg/dL 14   CREATININE mg/dL 0.95   GLUCOSE mg/dL 92   CALCIUM mg/dL 9.5     Results from last 7 days   Lab Units 09/25/24  0410  09/24/24  1705   HSTROP T ng/L 13  13 15*     Results from last 7 days   Lab Units 09/26/24  0323   WBC 10*3/mm3 5.72   HEMOGLOBIN g/dL 11.9*   HEMATOCRIT % 36.2   PLATELETS 10*3/mm3 262     Results from last 7 days   Lab Units 09/24/24  1917   INR  1.00         Results from last 7 days   Lab Units 09/25/24  0410   MAGNESIUM mg/dL 2.2           I reviewed the patient's new clinical results.        Assessment & Plan:  Progressive dyspnea  See echocardiogram results below  Stress test yesterday showed a normal myocardial perfusion study with no evidence of ischemia.  CT of the chest negative for pulmonary embolism  Heart failure with preserved ejection fraction   Echocardiogram yesterday showed EF 70%, grade 1 diastolic dysfunction, mild to moderate mitral valve regurgitation, normal wall segment contraction.  She has received IV diuretics with modest response. She appears euvolemic on exam today.   proBNP normal  Abnormal CT scan with fibrotic changes and pulmonary nodule  Pulmonology is following  Cardiomegaly  Obstructive sleep apnea  Untreated  Dilated ascending aorta  Verified by CT  Hypertension  Has only been on hydrochlorothiazide, losartan at home.  Will initiate low-dose beta-blocker therapy.  Dyslipidemia  Cough  May consider trialing off of losartan once blood pressure is better controlled.  S/p lap band surgery  Thyroid nodule  Obesity     No objection to discharge from a cardiology standpoint, would recommend follow up with her primary cardiologist in two weeks.     Sandi Guzman, CHINO  09/26/24  09:54 EDT

## 2024-09-26 NOTE — PLAN OF CARE
Goal Outcome Evaluation:     Problem: Adult Inpatient Plan of Care  Goal: Plan of Care Review  Outcome: Progressing  Flowsheets (Taken 9/26/2024 0433)  Progress: improving  Goal: Patient-Specific Goal (Individualized)  Outcome: Progressing  Goal: Absence of Hospital-Acquired Illness or Injury  Outcome: Progressing  Intervention: Identify and Manage Fall Risk  Recent Flowsheet Documentation  Taken 9/26/2024 0407 by Tej Christy RN  Safety Promotion/Fall Prevention: safety round/check completed  Taken 9/26/2024 0224 by Tej Christy RN  Safety Promotion/Fall Prevention: safety round/check completed  Taken 9/25/2024 2023 by Tej Christy RN  Safety Promotion/Fall Prevention:   safety round/check completed   room organization consistent   clutter free environment maintained   assistive device/personal items within reach  Intervention: Prevent Skin Injury  Recent Flowsheet Documentation  Taken 9/25/2024 2023 by Tej Christy RN  Body Position: position changed independently  Intervention: Prevent and Manage VTE (Venous Thromboembolism) Risk  Recent Flowsheet Documentation  Taken 9/25/2024 2023 by Tej Christy RN  Activity Management: ambulated to bathroom  Goal: Optimal Comfort and Wellbeing  Outcome: Progressing  Intervention: Monitor Pain and Promote Comfort  Recent Flowsheet Documentation  Taken 9/25/2024 2023 by Tej Christy RN  Pain Management Interventions: see MAR  Intervention: Provide Person-Centered Care  Recent Flowsheet Documentation  Taken 9/25/2024 2023 by Tej Christy RN  Trust Relationship/Rapport:   care explained   choices provided  Goal: Readiness for Transition of Care  Outcome: Progressing     Problem: Hypertension Comorbidity  Goal: Blood Pressure in Desired Range  Outcome: Progressing           Progress: improving

## 2024-09-26 NOTE — DISCHARGE INSTRUCTIONS
Begin taking metoprolol 25 mg daily.  Follow-up with your primary cardiologist in 2 weeks.  Return to the emergency department with worsening symptoms, uncontrolled pain, inability to tolerate oral liquids, fever greater than 101°F not controlled by Tylenol or as needed with emergent concerns.

## 2024-09-27 ENCOUNTER — TRANSITIONAL CARE MANAGEMENT TELEPHONE ENCOUNTER (OUTPATIENT)
Dept: CALL CENTER | Facility: HOSPITAL | Age: 79
End: 2024-09-27
Payer: MEDICARE

## 2024-09-27 NOTE — CASE MANAGEMENT/SOCIAL WORK
Case Management Discharge Note      Final Note: home    Provided Post Acute Provider List?: N/A    Selected Continued Care - Discharged on 9/26/2024 Admission date: 9/24/2024 - Discharge disposition: Home or Self Care      Destination    No services have been selected for the patient.                Durable Medical Equipment    No services have been selected for the patient.                Dialysis/Infusion    No services have been selected for the patient.                Home Medical Care    No services have been selected for the patient.                Therapy    No services have been selected for the patient.                Community Resources    No services have been selected for the patient.                Community & DME    No services have been selected for the patient.                         Final Discharge Disposition Code: 01 - home or self-care

## 2024-10-06 DIAGNOSIS — F32.A DEPRESSION, UNSPECIFIED DEPRESSION TYPE: ICD-10-CM

## 2024-10-07 ENCOUNTER — OFFICE VISIT (OUTPATIENT)
Dept: FAMILY MEDICINE CLINIC | Facility: CLINIC | Age: 79
End: 2024-10-07
Payer: MEDICARE

## 2024-10-07 VITALS
RESPIRATION RATE: 14 BRPM | WEIGHT: 202 LBS | BODY MASS INDEX: 39.66 KG/M2 | OXYGEN SATURATION: 96 % | TEMPERATURE: 97.9 F | DIASTOLIC BLOOD PRESSURE: 78 MMHG | HEIGHT: 60 IN | SYSTOLIC BLOOD PRESSURE: 136 MMHG | HEART RATE: 56 BPM

## 2024-10-07 DIAGNOSIS — M19.90 ARTHRITIS: ICD-10-CM

## 2024-10-07 DIAGNOSIS — Z09 HOSPITAL DISCHARGE FOLLOW-UP: Primary | ICD-10-CM

## 2024-10-07 DIAGNOSIS — I10 ESSENTIAL HYPERTENSION: ICD-10-CM

## 2024-10-07 DIAGNOSIS — I50.9 ACUTE ON CHRONIC CONGESTIVE HEART FAILURE, UNSPECIFIED HEART FAILURE TYPE: ICD-10-CM

## 2024-10-07 DIAGNOSIS — R06.02 SHORTNESS OF BREATH: ICD-10-CM

## 2024-10-07 PROCEDURE — 1126F AMNT PAIN NOTED NONE PRSNT: CPT | Performed by: FAMILY MEDICINE

## 2024-10-07 PROCEDURE — 3078F DIAST BP <80 MM HG: CPT | Performed by: FAMILY MEDICINE

## 2024-10-07 PROCEDURE — 99495 TRANSJ CARE MGMT MOD F2F 14D: CPT | Performed by: FAMILY MEDICINE

## 2024-10-07 PROCEDURE — 1160F RVW MEDS BY RX/DR IN RCRD: CPT | Performed by: FAMILY MEDICINE

## 2024-10-07 PROCEDURE — 1111F DSCHRG MED/CURRENT MED MERGE: CPT | Performed by: FAMILY MEDICINE

## 2024-10-07 PROCEDURE — 3075F SYST BP GE 130 - 139MM HG: CPT | Performed by: FAMILY MEDICINE

## 2024-10-07 PROCEDURE — 1159F MED LIST DOCD IN RCRD: CPT | Performed by: FAMILY MEDICINE

## 2024-10-07 RX ORDER — CITALOPRAM HYDROBROMIDE 40 MG/1
TABLET ORAL
Qty: 90 TABLET | Refills: 0 | Status: SHIPPED | OUTPATIENT
Start: 2024-10-07

## 2024-10-07 RX ORDER — MELOXICAM 15 MG/1
TABLET ORAL
Qty: 90 TABLET | Refills: 0 | Status: SHIPPED | OUTPATIENT
Start: 2024-10-07

## 2024-10-07 RX ORDER — FLUCONAZOLE 100 MG/1
100 TABLET ORAL DAILY
Qty: 10 TABLET | Refills: 0 | Status: SHIPPED | OUTPATIENT
Start: 2024-10-07

## 2024-10-07 NOTE — TELEPHONE ENCOUNTER
LOV                  6/21/2024                    NOV                  10/7/2024  LAST REFILL     8/20/2024  PROTOCOL       Met

## 2024-10-07 NOTE — TELEPHONE ENCOUNTER
LOV                  10/7/2024                    NOV                    LAST REFILL     1/30/2024  PROTOCOL       met

## 2024-10-07 NOTE — PROGRESS NOTES
Transitional Care Follow Up Visit  Subjective     Bella Dukes is a 79 y.o. female who presents for a transitional care management visit.    Within 48 business hours after discharge our office contacted her via telephone to coordinate her care and needs.      I reviewed and discussed the details of that call along with the discharge summary, hospital problems, inpatient lab results, inpatient diagnostic studies, and consultation reports with Bella.     Current outpatient and discharge medications have been reconciled for the patient.  Reviewed by: Merari Joshua MD          9/26/2024     5:51 PM   Date of TCM Phone Call   [facility]   Date of Admission 9/24/2024   Date of Discharge 9/26/2024   Discharge Disposition Home or Self Care     Risk for Readmission (LACE) Score: 6 (9/26/2024  6:00 AM)  Discharge diagnosis:  Acute exacerbation of CHF (congestive heart failure)    Dyspnea    History of Present Illness   Pt presents today for hospital follow up and she states she is feeling much better.  She has some SOB but is much better than before.  She has fatigue as well.  No swelling in legs.    HTN- no CP or HA  Course During Hospital Stay:    Hospital Outcome:   Bella Dukes is a 79 y.o. female who presented to the emergency department on 9/24 complaining of progressively worsening shortness of breath.  Comes in complaining of shortness of breath.    In the ED, Initial troponin 15, proBNP normal.  CMP largely unremarkable for acute findings.  D-dimer negative.  Coags obtained.  CBC largely unremarkable for acute findings.  COVID and flu swab negative.  Chest x-ray shows cardiomegaly with vascular congestion, without edema.  EKG shows sinus rhythm 81 bpm, no ST elevation apparent.  Patient was given full dose aspirin and IV Lasix 40 mg.  Patient is afebrile, pulse in the 70s, on room air oxygen 96% SpO2 and blood pressure 170s over 104.     9/25/2024:  5:56 PM.  Pulmonology has evaluated patient and  "feel that being fluid overloaded as a cause of her shortness of breath.  She did have a 6 mm pulmonary nodule in the right upper lobe but no significant risk factors.  Pulmonology did not feel this needed further follow-up per Fleischner criteria.  They are signing off.  Cardiology has evaluated and a stress test was ordered which was read as a low risk study.  A new echocardiogram was ordered but is yet to be read.  Continue diuresis for now.  Cardiology continues to follow.     9/26/2024: Echocardiogram shows EF of 70.3%, left ventricular wall thickness consistent with mild to moderate concentric hypertrophy, all left ventricular wall segments contract normally, trace to mild aortic valve regurgitation, mild to moderate mitral valve regurgitation, small (less than 1 cm) circumferential pericardial effusion with no evidence of cardiac tamponade, borderline dilatation of the ascending aorta, ascending aorta equals 3.7 cm.     Patient was seen by cardiology again today, CHINO Clifford.  Patient will be discharged on metoprolol 25 mg daily.  Patient to follow-up with her primary cardiologist in 2 weeks.  Discussed with patient who expresses understanding and is in agreement with plan.     The following portions of the patient's history were reviewed and updated as appropriate: allergies, current medications, past family history, past medical history, past social history, past surgical history, and problem list.    Review of Systems    Objective   /78 (BP Location: Right arm, Patient Position: Sitting)   Pulse 56   Temp 97.9 °F (36.6 °C)   Resp 14   Ht 152.4 cm (60\")   Wt 91.6 kg (202 lb)   SpO2 96%   BMI 39.45 kg/m²   Physical Exam  Vitals and nursing note reviewed.   Constitutional:       Appearance: Normal appearance. She is well-developed.   Cardiovascular:      Rate and Rhythm: Normal rate and regular rhythm.      Heart sounds: Normal heart sounds. No murmur heard.  Pulmonary:      Effort: " Pulmonary effort is normal. No respiratory distress.      Breath sounds: Normal breath sounds. No stridor. No wheezing or rhonchi.   Musculoskeletal:      Right lower leg: No edema.      Left lower leg: No edema.   Neurological:      General: No focal deficit present.      Mental Status: She is alert and oriented to person, place, and time. She is not disoriented.   Psychiatric:         Mood and Affect: Mood normal.         Behavior: Behavior normal.         Assessment & Plan   Diagnoses and all orders for this visit:    1. Hospital discharge follow-up (Primary)    2. Acute on chronic congestive heart failure, unspecified heart failure type  -     Ambulatory Referral to Cardiology    3. Shortness of breath    4. Essential hypertension    Other orders  -     fluconazole (Diflucan) 100 MG tablet; Take 1 tablet by mouth Daily.  Dispense: 10 tablet; Refill: 0             I requested and reviewed all records, labs, and diagnostics from the hospital with patient and family.  Patient is to follow-up with specialists as discussed and directed.  Follow-up with cardiologist as scheduled.

## 2024-10-11 ENCOUNTER — TELEPHONE (OUTPATIENT)
Dept: FAMILY MEDICINE CLINIC | Facility: CLINIC | Age: 79
End: 2024-10-11

## 2024-10-11 NOTE — TELEPHONE ENCOUNTER
Caller: Bella Dukes    Relationship: Self    Best call back number: [phone]    What was the call regarding: PLEASE CONTACT PATIENT ABOUT REFERRAL FOR CARDIOLOGY. SHE HAS NOT HEARD BACK FROM ANYONE YET.

## 2024-10-18 DIAGNOSIS — G43.709 CHRONIC MIGRAINE WITHOUT AURA WITHOUT STATUS MIGRAINOSUS, NOT INTRACTABLE: ICD-10-CM

## 2024-10-18 RX ORDER — AMITRIPTYLINE HYDROCHLORIDE 10 MG/1
10 TABLET ORAL
Qty: 90 TABLET | Refills: 3 | Status: SHIPPED | OUTPATIENT
Start: 2024-10-18

## 2024-10-18 NOTE — TELEPHONE ENCOUNTER
LOV 10/7/24  NOV 12/27/24  LF 1/22/24    Protocol  Please Advise    [facility]

## 2024-10-24 RX ORDER — METOPROLOL SUCCINATE 25 MG/1
25 TABLET, EXTENDED RELEASE ORAL
Qty: 90 TABLET | Refills: 0 | Status: SHIPPED | OUTPATIENT
Start: 2024-10-24

## 2024-10-24 NOTE — TELEPHONE ENCOUNTER
Caller: Bella Dukes TRESSA    Relationship: Self    Best call back number: [phone]    Requested Prescriptions:   Requested Prescriptions     Pending Prescriptions Disp Refills    metoprolol succinate XL (TOPROL-XL) 25 MG 24 hr tablet 30 tablet 0     Sig: Take 1 tablet by mouth Daily.        Pharmacy where request should be sent: [id number] - [address] - [phone] PH - [phone] FX     Last office visit with prescribing clinician: 10/7/2024   Last telemedicine visit with prescribing clinician: Visit date not found   Next office visit with prescribing clinician: Visit date not found     Additional details provided by patient: PATIENT HAS A 1 WEEK SUPPLY LEFT OF THIS MEDICATION.     Does the patient have less than a 3 day supply:  [] Yes  [x] No    Would you like a call back once the refill request has been completed: [] Yes [x] No    If the office needs to give you a call back, can they leave a voicemail: [] Yes [x] No    Renato Brennan Rep   10/24/24 13:46 EDT

## 2024-10-24 NOTE — TELEPHONE ENCOUNTER
LOV                  10/7/2024                    NOV                  Visit date not found  LAST REFILL     9/27/2024  PROTOCOL       Met

## 2024-10-25 ENCOUNTER — OFFICE VISIT (OUTPATIENT)
Dept: CARDIOLOGY | Facility: CLINIC | Age: 79
End: 2024-10-25
Payer: MEDICARE

## 2024-10-25 VITALS
WEIGHT: 199 LBS | RESPIRATION RATE: 16 BRPM | DIASTOLIC BLOOD PRESSURE: 82 MMHG | OXYGEN SATURATION: 99 % | BODY MASS INDEX: 39.07 KG/M2 | SYSTOLIC BLOOD PRESSURE: 122 MMHG | HEIGHT: 60 IN | HEART RATE: 59 BPM

## 2024-10-25 DIAGNOSIS — I50.32 CHRONIC DIASTOLIC HEART FAILURE: ICD-10-CM

## 2024-10-25 DIAGNOSIS — R55 SYNCOPE AND COLLAPSE: Primary | ICD-10-CM

## 2024-10-25 DIAGNOSIS — I10 ESSENTIAL HYPERTENSION: ICD-10-CM

## 2024-11-01 ENCOUNTER — TELEPHONE (OUTPATIENT)
Dept: FAMILY MEDICINE CLINIC | Facility: CLINIC | Age: 79
End: 2024-11-01
Payer: MEDICARE

## 2024-11-01 NOTE — TELEPHONE ENCOUNTER
Caller: Roseline Bella L    Relationship: Self    Best call back number:[phone] (Mobile)     Requested Prescriptions:   metoprolol succinate XL (TOPROL-XL) 25 MG 24 hr tablet        Pharmacy where request should be sent:  [id number] - [address] - [phone] PH - [phone] FX     Last office visit with prescribing clinician: 10/7/2024   Last telemedicine visit with prescribing clinician: Visit date not found   Next office visit with prescribing clinician: Visit date not found     Additional details provided by patient: PATIENT CALLED TO REQUEST A 90 DAY MEDICATION REFILL. PATIENT HAS A 30 DAY SUPPLY LEFT.      Does the patient have less than a 3 day supply:  [] Yes  [] No    Would you like a call back once the refill request has been completed: [] Yes [] No    If the office needs to give you a call back, can they leave a voicemail: [] Yes [] No    Renato Marshall Rep   11/01/24 10:32 EDT         THANKS

## 2024-11-01 NOTE — PROGRESS NOTES
[facility]      Patient Name: Bella Dukes  :1945  Age: 79 y.o.  Encounter Provider:  Levi Up Jr, MD      Chief Complaint: Initial evaluation congestive heart failure        HPI  Bella Dukes is a 79 y.o. female past medical history of generalized anxiety disorder, mixed hyperlipidemia, hypertension who presents for hospital follow-up of acute on chronic diastolic heart failure.  She was admitted 2024 with progressive dyspnea.  She had complaints of chest pain and was seen by cardiology.  Responded well to diuretics and was sent home on losartan and hydrochlorothiazide.  Echocardiogram showed EF of greater than 70% with no significant valvular heart disease.  The stress study showed no evidence of ischemia.  She notes recent fall with questionable loss of consciousness that resulted in facial trauma.  She has had a few of these episodes.  No specific complaints prior to or after the episode other than facial pain.  She has a known history of sleep apnea and stopped using her CPAP.  She is scheduled for follow-up with pulmonary and sleep medicine.  No family history of premature coronary artery disease or sudden cardiac death.  Her brother did die early of suppose it complications of emphysema.  The patient was a short-term smoker in her early years but quit many years ago.  She denies alcohol or illicit drug use.      The following portions of the patient's history were reviewed and updated as appropriate: allergies, current medications, past family history, past medical history, past social history, past surgical history and problem list.      Review of Systems   Constitutional: Negative for chills and fever.   HENT:  Negative for hoarse voice and sore throat.    Eyes:  Negative for double vision and photophobia.   Cardiovascular:  Positive for syncope. Negative for chest pain, leg swelling, near-syncope, orthopnea, palpitations and paroxysmal nocturnal dyspnea.  "  Respiratory:  Negative for cough and wheezing.    Skin:  Negative for poor wound healing and rash.   Musculoskeletal:  Negative for arthritis and joint swelling.   Gastrointestinal:  Negative for bloating, abdominal pain, hematemesis and hematochezia.   Neurological:  Negative for dizziness and focal weakness.   Psychiatric/Behavioral:  Negative for depression and suicidal ideas.      OBJECTIVE:   Vital Signs  Vitals:    10/25/24 1304   BP: 122/82   Pulse: 59   Resp: 16   SpO2: 99%     Estimated body mass index is 38.86 kg/m² as calculated from the following:    Height as of this encounter: 152.4 cm (60\").    Weight as of this encounter: 90.3 kg (199 lb).    Vitals reviewed.   Constitutional:       Appearance: Not in distress. Chronically ill-appearing.   Neck:      Vascular: No JVR. JVD normal.   Pulmonary:      Effort: Pulmonary effort is normal.      Breath sounds: Normal breath sounds. No wheezing. No rhonchi. No rales.   Chest:      Chest wall: Not tender to palpatation.   Cardiovascular:      PMI at left midclavicular line. Normal rate. Regular rhythm. Normal S1. Normal S2.       Murmurs: There is no murmur.      No gallop.  No click. No rub.   Pulses:     Intact distal pulses.   Edema:     Thigh: bilateral trace edema of the thigh.     Pretibial: bilateral trace edema of the pretibial area.     Ankle: bilateral trace edema of the ankle.     Feet: bilateral trace edema of the feet.  Abdominal:      General: Bowel sounds are normal.      Palpations: Abdomen is soft.      Tenderness: There is no abdominal tenderness.   Musculoskeletal: Normal range of motion.         General: No tenderness. Skin:     General: Skin is warm and dry.   Neurological:      General: No focal deficit present.      Mental Status: Alert and oriented to person, place and time.       ECG 12 Lead    Date/Time: 11/1/2024 9:03 AM  Performed by: Levi Up Jr., MD    Authorized by: Balaji Funes MD  Comparison: compared with " previous ECG from 9/24/2024  Similar to previous ECG  Rhythm: sinus rhythm  Conduction: right bundle branch block  Other findings: non-specific ST-T wave changes    Clinical impression: abnormal EKG      Lipid Panel          6/21/2024    14:38   Lipid Panel   Total Cholesterol 198    Triglycerides 157    HDL Cholesterol 51    VLDL Cholesterol 28    LDL Cholesterol  119         BUN   Date Value Ref Range Status   09/26/2024 14 8 - 23 mg/dL Final     Creatinine   Date Value Ref Range Status   09/26/2024 0.95 0.57 - 1.00 mg/dL Final   01/13/2023 1.00 0.60 - 1.30 mg/dL Final     Comment:     Serial Number: [id number]:  [id number]     Potassium   Date Value Ref Range Status   09/26/2024 3.3 (L) 3.5 - 5.2 mmol/L Final     Comment:     Slight hemolysis detected by analyzer. Result may be falsely elevated.     ALT (SGPT)   Date Value Ref Range Status   09/24/2024 11 1 - 33 U/L Final     AST (SGOT)   Date Value Ref Range Status   09/24/2024 14 1 - 32 U/L Final           ASSESSMENT:      Diagnosis Plan   1. Syncope and collapse  Holter Monitor - 72 Hour Up To 15 Days            PLAN OF CARE:     ?  Syncope -etiology uncertain.  Echo and stress study performed while in hospital.  We will check a Holter monitor.  Chronic diastolic heart failure -blood pressure is well-controlled today in clinic.  Twice daily blood pressure log to be sent in after 1 week.  She is euvolemic in clinic on hydrochlorothiazide.  Monitor closely.  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  Repeat CT chest next year.  Mixed hyperlipidemia    Return to clinic 3 months             Discharge Medications            Accurate as of October 25, 2024 11:59 PM. If you have any questions, ask your nurse or doctor.                Continue These Medications        Instructions Start Date   amitriptyline 10 MG tablet  Commonly known as: ELAVIL   10 mg, Oral, Every Night at Bedtime      aspirin 81 MG tablet   Daily      atorvastatin 40 MG tablet  Commonly  known as: LIPITOR   TAKE 1 TABLET BY MOUTH EVERY DAY      buPROPion  MG 24 hr tablet  Commonly known as: WELLBUTRIN XL   300 mg, Oral, Daily      buPROPion  MG 24 hr tablet  Commonly known as: WELLBUTRIN XL   150 mg, Oral, Daily      cetirizine 10 MG tablet  Commonly known as: zyrTEC   1 tablet, Daily      citalopram 40 MG tablet  Commonly known as: CeleXA   TAKE 1 TABLET BY MOUTH EVERY DAY      FA-Vitamin B-6-Vitamin B-12 2.2-25-0.5 MG tablet   Take  by mouth.      fluconazole 100 MG tablet  Commonly known as: Diflucan   100 mg, Oral, Daily      hydroCHLOROthiazide 25 MG tablet   25 mg, Oral, Daily      losartan 100 MG tablet  Commonly known as: COZAAR   100 mg, Oral, Daily      meloxicam 15 MG tablet  Commonly known as: MOBIC   TAKE 1 TABLET BY MOUTH EVERY DAY      metoprolol succinate XL 25 MG 24 hr tablet  Commonly known as: TOPROL-XL   25 mg, Oral, Every 24 Hours Scheduled      montelukast 10 MG tablet  Commonly known as: SINGULAIR   10 mg, Oral, Daily      MULTIPLE VITAMINS-MINERALS-FA PO   Take  by mouth.      multivitamin tablet tablet   1 tablet, Daily      pantoprazole 40 MG EC tablet  Commonly known as: PROTONIX   40 mg, Oral, Daily      potassium chloride 20 MEQ CR tablet  Commonly known as: KLOR-CON M20   20 mEq, Oral, 2 Times Daily      tolnaftate 1 % cream  Commonly known as: Tinactin   1 application , Topical, 2 Times Daily      VITAMIN B-6 PO   Take by mouth.      VITAMIN B12 PO   1 tablet, Daily      vitamin D 1.25 MG (96781 UT) capsule capsule  Commonly known as: ERGOCALCIFEROL   50,000 Units, Oral, Weekly               Thank you for allowing me to participate in the care of your patient,      Sincerely,   Levi Up Jr, MD  [facility]  11/01/24  08:59 EDT

## 2024-11-01 NOTE — TELEPHONE ENCOUNTER
90 day supply was sent on 10/24/24.  Pt notified and voiced understanding.    Pt would like an order for a walker.  Please advise to [facility].     [facility]

## 2024-11-07 DIAGNOSIS — E78.5 HYPERLIPIDEMIA, UNSPECIFIED HYPERLIPIDEMIA TYPE: ICD-10-CM

## 2024-11-07 RX ORDER — ATORVASTATIN CALCIUM 40 MG/1
TABLET, FILM COATED ORAL
Qty: 90 TABLET | Refills: 0 | Status: SHIPPED | OUTPATIENT
Start: 2024-11-07

## 2024-11-07 NOTE — TELEPHONE ENCOUNTER
LOV                  10/7/2024                    NOV                  Visit date not found  LAST REFILL     5/5/2024  PROTOCOL       Met

## 2024-11-12 ENCOUNTER — OFFICE VISIT (OUTPATIENT)
Dept: FAMILY MEDICINE CLINIC | Facility: CLINIC | Age: 79
End: 2024-11-12
Payer: MEDICARE

## 2024-11-12 VITALS
HEIGHT: 60 IN | OXYGEN SATURATION: 94 % | SYSTOLIC BLOOD PRESSURE: 120 MMHG | WEIGHT: 196.8 LBS | DIASTOLIC BLOOD PRESSURE: 80 MMHG | RESPIRATION RATE: 14 BRPM | BODY MASS INDEX: 38.64 KG/M2 | HEART RATE: 62 BPM | TEMPERATURE: 97.9 F

## 2024-11-12 DIAGNOSIS — M19.90 ARTHRITIS: ICD-10-CM

## 2024-11-12 DIAGNOSIS — M25.561 CHRONIC PAIN OF BOTH KNEES: ICD-10-CM

## 2024-11-12 DIAGNOSIS — R26.89 IMBALANCE: ICD-10-CM

## 2024-11-12 DIAGNOSIS — G89.29 CHRONIC PAIN OF BOTH KNEES: ICD-10-CM

## 2024-11-12 DIAGNOSIS — M25.562 CHRONIC PAIN OF BOTH KNEES: ICD-10-CM

## 2024-11-12 DIAGNOSIS — R29.6 FREQUENT FALLS: Primary | ICD-10-CM

## 2024-11-12 PROCEDURE — 1160F RVW MEDS BY RX/DR IN RCRD: CPT | Performed by: FAMILY MEDICINE

## 2024-11-12 PROCEDURE — 3079F DIAST BP 80-89 MM HG: CPT | Performed by: FAMILY MEDICINE

## 2024-11-12 PROCEDURE — 1125F AMNT PAIN NOTED PAIN PRSNT: CPT | Performed by: FAMILY MEDICINE

## 2024-11-12 PROCEDURE — 3074F SYST BP LT 130 MM HG: CPT | Performed by: FAMILY MEDICINE

## 2024-11-12 PROCEDURE — 1159F MED LIST DOCD IN RCRD: CPT | Performed by: FAMILY MEDICINE

## 2024-11-12 PROCEDURE — G2211 COMPLEX E/M VISIT ADD ON: HCPCS | Performed by: FAMILY MEDICINE

## 2024-11-12 PROCEDURE — 99213 OFFICE O/P EST LOW 20 MIN: CPT | Performed by: FAMILY MEDICINE

## 2024-11-12 NOTE — PROGRESS NOTES
"Chief Complaint  falls    Subjective        Bella Dukes presents to [facility]  History of Present Illness  Patient presents today after having frequent falls partially due to arthritis, chronic knee pains, imbalance.  She needs a walker and currently uses a friends walker.    Objective   Vital Signs:  /80 (BP Location: Right arm, Patient Position: Sitting)   Pulse 62   Temp 97.9 °F (36.6 °C)   Resp 14   Ht 152.4 cm (60\")   Wt 89.3 kg (196 lb 12.8 oz)   SpO2 94%   BMI 38.43 kg/m²   Estimated body mass index is 38.43 kg/m² as calculated from the following:    Height as of this encounter: 152.4 cm (60\").    Weight as of this encounter: 89.3 kg (196 lb 12.8 oz).            Physical Exam  Vitals and nursing note reviewed.   Constitutional:       Appearance: Normal appearance. She is well-developed.   Cardiovascular:      Rate and Rhythm: Normal rate and regular rhythm.      Heart sounds: Normal heart sounds. No murmur heard.  Pulmonary:      Effort: Pulmonary effort is normal. No respiratory distress.      Breath sounds: Normal breath sounds. No stridor. No wheezing or rhonchi.   Neurological:      General: No focal deficit present.      Mental Status: She is alert and oriented to person, place, and time. She is not disoriented.   Psychiatric:         Mood and Affect: Mood normal.         Behavior: Behavior normal.        Result Review :                Assessment and Plan   Diagnoses and all orders for this visit:    1. Frequent falls (Primary)  -     Ambulatory Referral to Physical Therapy for Evaluation & Treatment    2. Imbalance  -     Ambulatory Referral to Physical Therapy for Evaluation & Treatment    3. Chronic pain of both knees  -     Ambulatory Referral to Physical Therapy for Evaluation & Treatment    4. Arthritis  -     Ambulatory Referral to Physical Therapy for Evaluation & Treatment             Follow Up   Return for Medicare wellness exam MyChart " 12/22/2024.  Patient was given instructions and counseling regarding her condition or for health maintenance advice. Please see specific information pulled into the AVS if appropriate.       Will order walker for patient.    Physical therapy    Answers submitted by the patient for this visit:  Other (Submitted on 11/12/2024)  Please describe your symptoms.: I’M VERY SORE FROM A FALL ON 10/22/24, AND A FALL ON 10/27/24, HAVING DIFFICULTY WALKING  Have you had these symptoms before?: No  How long have you been having these symptoms?: Greater than 2 weeks  Primary Reason for Visit (Submitted on 11/12/2024)  What is the primary reason for your visit?: Problem Not Listed

## 2024-11-18 ENCOUNTER — TELEPHONE (OUTPATIENT)
Dept: FAMILY MEDICINE CLINIC | Facility: CLINIC | Age: 79
End: 2024-11-18
Payer: MEDICARE

## 2024-11-18 ENCOUNTER — TELEPHONE (OUTPATIENT)
Dept: CARDIOLOGY | Facility: CLINIC | Age: 79
End: 2024-11-18
Payer: MEDICARE

## 2024-11-18 NOTE — TELEPHONE ENCOUNTER
The patient called and said that her hips were still hurting from when she fell a couple of weeks ago. She would like to know if there is anything Dr. Joshua can. She would like a call back at [phone].

## 2024-11-18 NOTE — TELEPHONE ENCOUNTER
Permission for the hub to transfer this call directly to the nurse triage line at [facility].    Lela Hernandez RN  [facility] Triage  11/18/24 13:12 EST

## 2024-11-18 NOTE — TELEPHONE ENCOUNTER
I called and spoke with the patient she informed she has been taking meloxicam and would like to try something else she is also thinking an xray might be needed as her hips are bothering her more.

## 2024-11-18 NOTE — TELEPHONE ENCOUNTER
----- Message from Levi Up sent at 11/18/2024  1:06 PM EST -----  Please let the patient know that there were no sustained heart rhythm problems on this Holter.  No further testing needed at this time.

## 2024-11-19 ENCOUNTER — HOSPITAL ENCOUNTER (OUTPATIENT)
Dept: GENERAL RADIOLOGY | Facility: HOSPITAL | Age: 79
Discharge: HOME | End: 2024-11-19
Admitting: FAMILY MEDICINE
Payer: MEDICARE

## 2024-11-19 DIAGNOSIS — M25.551 BILATERAL HIP PAIN: Primary | ICD-10-CM

## 2024-11-19 DIAGNOSIS — F32.A DEPRESSION, UNSPECIFIED DEPRESSION TYPE: ICD-10-CM

## 2024-11-19 DIAGNOSIS — M25.552 BILATERAL HIP PAIN: ICD-10-CM

## 2024-11-19 DIAGNOSIS — M25.552 BILATERAL HIP PAIN: Primary | ICD-10-CM

## 2024-11-19 DIAGNOSIS — M25.551 BILATERAL HIP PAIN: ICD-10-CM

## 2024-11-19 PROCEDURE — 73521 X-RAY EXAM HIPS BI 2 VIEWS: CPT

## 2024-11-19 RX ORDER — DICLOFENAC SODIUM 75 MG/1
75 TABLET, DELAYED RELEASE ORAL 2 TIMES DAILY
Qty: 60 TABLET | Refills: 3 | Status: SHIPPED | OUTPATIENT
Start: 2024-11-19

## 2024-11-19 NOTE — TELEPHONE ENCOUNTER
I called and spoke with the patient she was informed xray was ordered to [facility], she was also advised to stop mobic and start diclofenac . Patient expressed understanding

## 2024-11-20 RX ORDER — BUPROPION HYDROCHLORIDE 300 MG/1
300 TABLET ORAL DAILY
Qty: 90 TABLET | Refills: 1 | Status: SHIPPED | OUTPATIENT
Start: 2024-11-20

## 2024-11-20 RX ORDER — BUPROPION HYDROCHLORIDE 150 MG/1
150 TABLET ORAL DAILY
Qty: 90 TABLET | Refills: 1 | Status: SHIPPED | OUTPATIENT
Start: 2024-11-20

## 2024-11-20 NOTE — TELEPHONE ENCOUNTER
LOV                  11/12/2024                    NOV                    LAST REFILL    5/5/2024   PROTOCOL       Met

## 2024-11-20 NOTE — TELEPHONE ENCOUNTER
Permission for the hub to transfer this call directly to the nurse triage line at [facility].    Attempted to call Bella TRESSA Merinoss, no answer.  Left a voicemail for patient to call back and ask to speak with the triage nurses.  Will continue to try to reach patient.    Lela Hernandez RN  [facility] Triage  11/20/24 11:17 EST

## 2024-11-20 NOTE — TELEPHONE ENCOUNTER
Pt called back in to triage.  Results and recommendations reviewed with pt.  Instructed to call with any further questions or concerns.  Verbalized understanding.    Kaye Tello RN  Triage Nurse, [facility]  11/20/24 15:05 EST

## 2024-11-22 ENCOUNTER — TELEPHONE (OUTPATIENT)
Dept: FAMILY MEDICINE CLINIC | Facility: CLINIC | Age: 79
End: 2024-11-22
Payer: MEDICARE

## 2024-11-22 DIAGNOSIS — I10 ESSENTIAL HYPERTENSION: ICD-10-CM

## 2024-11-22 NOTE — TELEPHONE ENCOUNTER
Some arthritis is present.  Official report is read by radiologist . Please call that dept and see if they have read it yet . Thanks.

## 2024-11-22 NOTE — TELEPHONE ENCOUNTER
Caller: Bella Dukes    Relationship: Self    Best call back number: [phone]    What test was performed: X RAY     When was the test performed: 11/19    Where was the test performed: OFFICE    Additional notes: PLEASE ADVISE ASAP.

## 2024-11-23 RX ORDER — LOSARTAN POTASSIUM 100 MG/1
100 TABLET ORAL DAILY
Qty: 90 TABLET | Refills: 0 | Status: SHIPPED | OUTPATIENT
Start: 2024-11-23

## 2024-11-25 DIAGNOSIS — M25.551 BILATERAL HIP PAIN: Primary | ICD-10-CM

## 2024-11-25 DIAGNOSIS — M25.552 BILATERAL HIP PAIN: Primary | ICD-10-CM

## 2025-01-03 DIAGNOSIS — T78.40XD ALLERGY, SUBSEQUENT ENCOUNTER: ICD-10-CM

## 2025-01-03 RX ORDER — MONTELUKAST SODIUM 10 MG/1
10 TABLET ORAL DAILY
Qty: 90 TABLET | Refills: 0 | Status: SHIPPED | OUTPATIENT
Start: 2025-01-03

## 2025-01-03 NOTE — TELEPHONE ENCOUNTER
LOV                  11/12/2024                    NOV                  Visit date not found  LAST REFILL     8/11/2024  PROTOCOL     Met

## 2025-01-22 RX ORDER — METOPROLOL SUCCINATE 25 MG/1
25 TABLET, EXTENDED RELEASE ORAL DAILY
Qty: 90 TABLET | Refills: 0 | Status: SHIPPED | OUTPATIENT
Start: 2025-01-22

## 2025-01-22 NOTE — TELEPHONE ENCOUNTER
LOV                  11/12/2024                    NOV                  Visit date not found-needs ov  LAST REFILL    10/24/2024   PROTOCOL       met

## 2025-01-24 ENCOUNTER — OFFICE VISIT (OUTPATIENT)
Dept: CARDIOLOGY | Facility: CLINIC | Age: 80
End: 2025-01-24
Payer: MEDICARE

## 2025-01-24 VITALS
WEIGHT: 206 LBS | OXYGEN SATURATION: 98 % | RESPIRATION RATE: 18 BRPM | HEIGHT: 60 IN | BODY MASS INDEX: 40.44 KG/M2 | SYSTOLIC BLOOD PRESSURE: 120 MMHG | DIASTOLIC BLOOD PRESSURE: 76 MMHG | HEART RATE: 72 BPM

## 2025-01-24 DIAGNOSIS — R55 SYNCOPE AND COLLAPSE: Primary | ICD-10-CM

## 2025-01-24 RX ORDER — DIAZEPAM 5 MG/1
5 TABLET ORAL EVERY 6 HOURS PRN
COMMUNITY
Start: 2025-01-20

## 2025-01-24 RX ORDER — TRAMADOL HYDROCHLORIDE 50 MG/1
50 TABLET ORAL EVERY 8 HOURS PRN
COMMUNITY
Start: 2025-01-15

## 2025-01-24 RX ORDER — ATORVASTATIN CALCIUM 40 MG/1
40 TABLET, FILM COATED ORAL DAILY
COMMUNITY

## 2025-01-24 NOTE — PROGRESS NOTES
[facility]      Patient Name: Bella Dukes  :1945  Age: 79 y.o.  Encounter Provider:  Levi Up Jr, MD      Chief Complaint: Follow-up evaluation falls        HPI  Bella Dukes is a 79 y.o. female past medical history of generalized anxiety disorder, mixed hyperlipidemia, hypertension, chronic diastolic heart failure who presents for follow-up of syncope.      Last clinic visit note: She was admitted 2024 with progressive dyspnea.  She had complaints of chest pain and was seen by cardiology.  Responded well to diuretics and was sent home on losartan and hydrochlorothiazide.  Echocardiogram showed EF of greater than 70% with no significant valvular heart disease.  The stress study showed no evidence of ischemia.  She notes recent fall with questionable loss of consciousness that resulted in facial trauma.  She has had a few of these episodes.  No specific complaints prior to or after the episode other than facial pain.  She has a known history of sleep apnea and stopped using her CPAP.  She is scheduled for follow-up with pulmonary and sleep medicine.  No family history of premature coronary artery disease or sudden cardiac death.  Her brother did die early of suppose it complications of emphysema.  The patient was a short-term smoker in her early years but quit many years ago.  She denies alcohol or illicit drug use.    Patient has had several falls since last visit.  Holter monitor was worn with no sustained arrhythmias.  Echocardiogram done in September with no significant abnormalities other than a small amount of aortic regurgitation.  Patient states with this fall she gets a drifting sensation and before she knows that she is on the floor.  No loss of consciousness.  No palpitations.  No angina.  No orthopnea, PND or edema.    The following portions of the patient's history were reviewed and updated as appropriate: allergies, current medications, past family history,  "past medical history, past social history, past surgical history and problem list.      Review of Systems   Constitutional: Negative for chills and fever.   HENT:  Negative for hoarse voice and sore throat.    Eyes:  Negative for double vision and photophobia.   Cardiovascular:  Positive for syncope. Negative for chest pain, leg swelling, near-syncope, orthopnea, palpitations and paroxysmal nocturnal dyspnea.   Respiratory:  Negative for cough and wheezing.    Skin:  Negative for poor wound healing and rash.   Musculoskeletal:  Negative for arthritis and joint swelling.   Gastrointestinal:  Negative for bloating, abdominal pain, hematemesis and hematochezia.   Neurological:  Negative for dizziness and focal weakness.   Psychiatric/Behavioral:  Negative for depression and suicidal ideas.        OBJECTIVE:   Vital Signs  Vitals:    01/24/25 1340   BP: 120/76   Pulse: 72   Resp: 18   SpO2: 98%     Estimated body mass index is 40.23 kg/m² as calculated from the following:    Height as of this encounter: 152.4 cm (60\").    Weight as of this encounter: 93.4 kg (206 lb).    Vitals reviewed.   Constitutional:       Appearance: Not in distress. Chronically ill-appearing.   Neck:      Vascular: No JVR. JVD normal.   Pulmonary:      Effort: Pulmonary effort is normal.      Breath sounds: Normal breath sounds. No wheezing. No rhonchi. No rales.   Chest:      Chest wall: Not tender to palpatation.   Cardiovascular:      PMI at left midclavicular line. Normal rate. Regular rhythm. Normal S1. Normal S2.       Murmurs: There is no murmur.      No gallop.  No click. No rub.   Pulses:     Intact distal pulses.   Edema:     Thigh: bilateral trace edema of the thigh.     Pretibial: bilateral trace edema of the pretibial area.     Ankle: bilateral trace edema of the ankle.     Feet: bilateral trace edema of the feet.  Abdominal:      General: Bowel sounds are normal.      Palpations: Abdomen is soft.      Tenderness: There is no " abdominal tenderness.   Musculoskeletal: Normal range of motion.         General: No tenderness. Skin:     General: Skin is warm and dry.   Neurological:      General: No focal deficit present.      Mental Status: Alert and oriented to person, place and time.       Procedures  Lipid Panel          6/21/2024    14:38   Lipid Panel   Total Cholesterol 198    Triglycerides 157    HDL Cholesterol 51    VLDL Cholesterol 28    LDL Cholesterol  119         BUN   Date Value Ref Range Status   09/26/2024 14 8 - 23 mg/dL Final     Creatinine   Date Value Ref Range Status   09/26/2024 0.95 0.57 - 1.00 mg/dL Final   01/13/2023 1.00 0.60 - 1.30 mg/dL Final     Comment:     Serial Number: [id number]:  [id number]     Potassium   Date Value Ref Range Status   09/26/2024 3.3 (L) 3.5 - 5.2 mmol/L Final     Comment:     Slight hemolysis detected by analyzer. Result may be falsely elevated.     ALT (SGPT)   Date Value Ref Range Status   09/24/2024 11 1 - 33 U/L Final     AST (SGOT)   Date Value Ref Range Status   09/24/2024 14 1 - 32 U/L Final           ASSESSMENT:     No diagnosis found.        PLAN OF CARE:     ?  Syncope -etiology uncertain.  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  Not sure the symptoms are classic for paroxysmal or positional vertigo but we have undergone a comprehensive cardiac workup with no etiology and I think it would be beneficial for her to be seen by the neurology service.  Chronic diastolic heart failure -blood pressure is well-controlled today in clinic.  She is euvolemic in clinic on hydrochlorothiazide.  Monitor closely.  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  Repeat CT chest next year.  Mixed hyperlipidemia    Return to clinic 3 months             Discharge Medications            Accurate as of January 24, 2025  2:10 PM. If you have any questions, ask your nurse or doctor.                Continue These Medications        Instructions Start  Date   amitriptyline 10 MG tablet  Commonly known as: ELAVIL   10 mg, Oral, Every Night at Bedtime      aspirin 81 MG tablet   Daily      atorvastatin 40 MG tablet  Commonly known as: LIPITOR   40 mg, Daily      buPROPion  MG 24 hr tablet  Commonly known as: WELLBUTRIN XL   300 mg, Oral, Daily      buPROPion  MG 24 hr tablet  Commonly known as: WELLBUTRIN XL   150 mg, Oral, Daily      cetirizine 10 MG tablet  Commonly known as: zyrTEC   1 tablet, Daily      citalopram 40 MG tablet  Commonly known as: CeleXA   TAKE 1 TABLET BY MOUTH EVERY DAY      diazePAM 5 MG tablet  Commonly known as: VALIUM   5 mg, Every 6 Hours PRN      diphenhydrAMINE-acetaminophen  MG tablet per tablet  Commonly known as: TYLENOL PM   1 tablet, Nightly PRN      FA-Vitamin B-6-Vitamin B-12 2.2-25-0.5 MG tablet   Take  by mouth.      losartan 100 MG tablet  Commonly known as: COZAAR   100 mg, Oral, Daily      metoprolol succinate XL 25 MG 24 hr tablet  Commonly known as: TOPROL-XL   25 mg, Oral, Daily      montelukast 10 MG tablet  Commonly known as: SINGULAIR   10 mg, Oral, Daily      MULTIPLE VITAMINS-MINERALS-FA PO   Take  by mouth.      multivitamin tablet tablet   1 tablet, Daily      pantoprazole 40 MG EC tablet  Commonly known as: PROTONIX   40 mg, Oral, Daily      tolnaftate 1 % cream  Commonly known as: Tinactin   1 application , Topical, 2 Times Daily      traMADol 50 MG tablet  Commonly known as: ULTRAM   50 mg, Every 8 Hours PRN      VITAMIN B-6 PO   Take by mouth.      VITAMIN B12 PO   1 tablet, Daily               Thank you for allowing me to participate in the care of your patient,      Sincerely,   Levi Up Jr, MD  [facility]  01/24/25  14:10 EST

## 2025-02-10 ENCOUNTER — OFFICE VISIT (OUTPATIENT)
Dept: FAMILY MEDICINE CLINIC | Facility: CLINIC | Age: 80
End: 2025-02-10
Payer: MEDICARE

## 2025-02-10 VITALS
WEIGHT: 203 LBS | TEMPERATURE: 98 F | DIASTOLIC BLOOD PRESSURE: 70 MMHG | HEART RATE: 78 BPM | SYSTOLIC BLOOD PRESSURE: 120 MMHG | HEIGHT: 60 IN | BODY MASS INDEX: 39.85 KG/M2 | OXYGEN SATURATION: 96 % | RESPIRATION RATE: 14 BRPM

## 2025-02-10 DIAGNOSIS — I10 ESSENTIAL HYPERTENSION: Primary | ICD-10-CM

## 2025-02-10 DIAGNOSIS — R73.03 PREDIABETES: ICD-10-CM

## 2025-02-10 DIAGNOSIS — E55.9 VITAMIN D DEFICIENCY: ICD-10-CM

## 2025-02-10 DIAGNOSIS — E78.5 DYSLIPIDEMIA: ICD-10-CM

## 2025-02-10 PROCEDURE — 3074F SYST BP LT 130 MM HG: CPT | Performed by: FAMILY MEDICINE

## 2025-02-10 PROCEDURE — 99214 OFFICE O/P EST MOD 30 MIN: CPT | Performed by: FAMILY MEDICINE

## 2025-02-10 PROCEDURE — 1159F MED LIST DOCD IN RCRD: CPT | Performed by: FAMILY MEDICINE

## 2025-02-10 PROCEDURE — 1160F RVW MEDS BY RX/DR IN RCRD: CPT | Performed by: FAMILY MEDICINE

## 2025-02-10 PROCEDURE — 3078F DIAST BP <80 MM HG: CPT | Performed by: FAMILY MEDICINE

## 2025-02-10 PROCEDURE — 1125F AMNT PAIN NOTED PAIN PRSNT: CPT | Performed by: FAMILY MEDICINE

## 2025-02-10 PROCEDURE — G2211 COMPLEX E/M VISIT ADD ON: HCPCS | Performed by: FAMILY MEDICINE

## 2025-02-10 NOTE — PROGRESS NOTES
"Chief Complaint  Med Refill    Subjective        Bella Dukes presents to [facility]  History of Present Illness  Pt presents today for refills, labs and  HTN- no CP or HA  HLD - on med and stable.    Prediabetes stable   Vit d deficiency- stable and no med.   Objective   Vital Signs:  /70 (BP Location: Left arm, Patient Position: Sitting)   Pulse 78   Temp 98 °F (36.7 °C)   Resp 14   Ht 152.4 cm (60\")   Wt 92.1 kg (203 lb)   SpO2 96%   BMI 39.65 kg/m²   Estimated body mass index is 39.65 kg/m² as calculated from the following:    Height as of this encounter: 152.4 cm (60\").    Weight as of this encounter: 92.1 kg (203 lb).            Physical Exam  Vitals and nursing note reviewed.   Constitutional:       Appearance: Normal appearance. She is well-developed.   Cardiovascular:      Rate and Rhythm: Normal rate and regular rhythm.      Heart sounds: Normal heart sounds. No murmur heard.  Pulmonary:      Effort: Pulmonary effort is normal. No respiratory distress.      Breath sounds: Normal breath sounds. No stridor. No wheezing or rhonchi.   Neurological:      General: No focal deficit present.      Mental Status: She is alert and oriented to person, place, and time. She is not disoriented.   Psychiatric:         Mood and Affect: Mood normal.         Behavior: Behavior normal.        Result Review :                Assessment and Plan   Diagnoses and all orders for this visit:    1. Essential hypertension (Primary)  -     Cancel: Comprehensive Metabolic Panel  -     Comprehensive Metabolic Panel    2. Dyslipidemia  -     Cancel: Lipid Panel  -     Lipid Panel    3. Vitamin D deficiency  -     Cancel: Vitamin D,25-Hydroxy  -     Vitamin D,25-Hydroxy    4. Prediabetes  -     Hemoglobin A1c             Follow Up   Return for bryce frias next couple of weeks and regreymundor 6 mo follow up  .  Patient was given instructions and counseling regarding her condition or for health " maintenance advice. Please see specific information pulled into the AVS if appropriate.         Refills on med  Labs.

## 2025-02-11 LAB
25(OH)D3+25(OH)D2 SERPL-MCNC: 37.7 NG/ML (ref 30–100)
ALBUMIN SERPL-MCNC: 3.9 G/DL (ref 3.8–4.8)
ALP SERPL-CCNC: 89 IU/L (ref 44–121)
ALT SERPL-CCNC: 16 IU/L (ref 0–32)
AST SERPL-CCNC: 22 IU/L (ref 0–40)
BILIRUB SERPL-MCNC: <0.2 MG/DL (ref 0–1.2)
BUN SERPL-MCNC: 16 MG/DL (ref 8–27)
BUN/CREAT SERPL: 17 (ref 12–28)
CALCIUM SERPL-MCNC: 10.2 MG/DL (ref 8.7–10.3)
CHLORIDE SERPL-SCNC: 102 MMOL/L (ref 96–106)
CHOLEST SERPL-MCNC: 167 MG/DL (ref 100–199)
CO2 SERPL-SCNC: 24 MMOL/L (ref 20–29)
CREAT SERPL-MCNC: 0.95 MG/DL (ref 0.57–1)
EGFRCR SERPLBLD CKD-EPI 2021: 61 ML/MIN/1.73
GLOBULIN SER CALC-MCNC: 3.6 G/DL (ref 1.5–4.5)
GLUCOSE SERPL-MCNC: 102 MG/DL (ref 70–99)
HBA1C MFR BLD: 6 % (ref 4.8–5.6)
HDLC SERPL-MCNC: 47 MG/DL
LDLC SERPL CALC-MCNC: 81 MG/DL (ref 0–99)
POTASSIUM SERPL-SCNC: 4.7 MMOL/L (ref 3.5–5.2)
PROT SERPL-MCNC: 7.5 G/DL (ref 6–8.5)
SODIUM SERPL-SCNC: 141 MMOL/L (ref 134–144)
TRIGL SERPL-MCNC: 237 MG/DL (ref 0–149)
VLDLC SERPL CALC-MCNC: 39 MG/DL (ref 5–40)

## 2025-02-18 ENCOUNTER — TELEPHONE (OUTPATIENT)
Dept: FAMILY MEDICINE CLINIC | Facility: CLINIC | Age: 80
End: 2025-02-18
Payer: MEDICARE

## 2025-02-18 RX ORDER — FUROSEMIDE 20 MG/1
TABLET ORAL
Qty: 20 TABLET | Refills: 2 | Status: SHIPPED | OUTPATIENT
Start: 2025-02-18

## 2025-02-18 NOTE — TELEPHONE ENCOUNTER
Caller: Belal Dukes    Relationship: Self    Best call back number [phone]     What medication are you requesting: TREATMENT FOR WELLING IN BOTH LEGS    What are your current symptoms: WELLING IN BOTH LEGS AND CRAMPING    How long have you been experiencing symptoms: 2 DAYS    Have you had these symptoms before:    [x] Yes  [] No    Have you been treated for these symptoms before:   [x] Yes  [] No    If a prescription is needed, what is your preferred pharmacy and phone number: [id number] - [place] [id number] - [phone] [facility] [phone] FX     Additional notes:  PLEASE CALL TO ADVISE

## 2025-03-03 RX ORDER — ATORVASTATIN CALCIUM 40 MG/1
40 TABLET, FILM COATED ORAL DAILY
Qty: 90 TABLET | Refills: 0 | Status: SHIPPED | OUTPATIENT
Start: 2025-03-03

## 2025-03-03 NOTE — TELEPHONE ENCOUNTER
LOV                  2/10/2025                    NOV                  Visit date not found  LAST REFILL     Not filled here   PROTOCOL

## 2025-03-13 DIAGNOSIS — I10 ESSENTIAL HYPERTENSION: ICD-10-CM

## 2025-03-14 RX ORDER — LOSARTAN POTASSIUM 100 MG/1
100 TABLET ORAL DAILY
Qty: 90 TABLET | Refills: 1 | Status: SHIPPED | OUTPATIENT
Start: 2025-03-14

## 2025-03-14 NOTE — TELEPHONE ENCOUNTER
LOV                  2/10/2025                    NOV                  Visit date not found  LAST REFILL     11/23/2024  PROTOCOL       Met

## 2025-04-10 ENCOUNTER — TELEMEDICINE (OUTPATIENT)
Dept: FAMILY MEDICINE CLINIC | Facility: CLINIC | Age: 80
End: 2025-04-10
Payer: MEDICARE

## 2025-04-10 DIAGNOSIS — R41.3 MEMORY DEFICIT: Primary | ICD-10-CM

## 2025-04-10 DIAGNOSIS — B35.4 TINEA CORPORIS: ICD-10-CM

## 2025-04-10 DIAGNOSIS — M54.41 CHRONIC MIDLINE LOW BACK PAIN WITH RIGHT-SIDED SCIATICA: ICD-10-CM

## 2025-04-10 DIAGNOSIS — M25.552 BILATERAL HIP PAIN: ICD-10-CM

## 2025-04-10 DIAGNOSIS — G89.29 CHRONIC MIDLINE LOW BACK PAIN WITH RIGHT-SIDED SCIATICA: ICD-10-CM

## 2025-04-10 DIAGNOSIS — M25.551 BILATERAL HIP PAIN: ICD-10-CM

## 2025-04-10 RX ORDER — CLOTRIMAZOLE 1 %
1 CREAM (GRAM) TOPICAL 2 TIMES DAILY
Qty: 42 G | Refills: 2 | Status: SHIPPED | OUTPATIENT
Start: 2025-04-10 | End: 2025-04-11 | Stop reason: SDUPTHER

## 2025-04-10 NOTE — PROGRESS NOTES
Subjective   The ABCs of the Annual Wellness Visit  Medicare Wellness Visit    Patient has consented to a MyChart visit.  Patient is at home and I am at my desk at [facility].        Bella Dukes is a 80 y.o. patient who presents for a Medicare Wellness Visit.    The following portions of the patient's history were reviewed and   updated as appropriate: allergies, current medications, past family history, past medical history, past social history, past surgical history, and problem list.    Compared to one year ago, the patient's physical   health is the same.  Compared to one year ago, the patient's mental   health is the same.    Recent Hospitalizations:  This patient has had a [facility] admission record on file within the last 365 days.  Current Medical Providers:  Patient Care Team:  Merari Joshua MD as PCP - General (Family Medicine)    Outpatient Medications Prior to Visit   Medication Sig Dispense Refill    amitriptyline (ELAVIL) 10 MG tablet TAKE 1 TABLET BY MOUTH AT BEDTIME 90 tablet 3    aspirin 81 MG tablet Take by mouth daily.      atorvastatin (LIPITOR) 40 MG tablet TAKE 1 TABLET BY MOUTH EVERY DAY 90 tablet 0    buPROPion XL (WELLBUTRIN XL) 150 MG 24 hr tablet TAKE 1 TABLET BY MOUTH EVERY DAY 90 tablet 1    buPROPion XL (WELLBUTRIN XL) 300 MG 24 hr tablet TAKE 1 TABLET BY MOUTH EVERY DAY 90 tablet 1    cetirizine (ZyrTEC) 10 MG tablet Take 1 tablet by mouth Daily.      citalopram (CeleXA) 40 MG tablet TAKE 1 TABLET BY MOUTH EVERY DAY 90 tablet 0    Cyanocobalamin (VITAMIN B12 PO) Take 1 tablet by mouth daily.      diazePAM (VALIUM) 5 MG tablet Take 1 tablet by mouth Every 6 (Six) Hours As Needed for Anxiety or Sleep.      diphenhydrAMINE-acetaminophen (TYLENOL PM)  MG tablet per tablet Take 1 tablet by mouth At Night As Needed for Sleep.      Folic Acid-Vit B6-Vit B12 (FA-VITAMIN B-6-VITAMIN B-12) 2.2-25-0.5 MG tablet Take  by mouth.      furosemide (Lasix) 20 MG tablet One a day prn  swelling. 20 tablet 2    losartan (COZAAR) 100 MG tablet TAKE 1 TABLET BY MOUTH EVERY DAY 90 tablet 1    metoprolol succinate XL (TOPROL-XL) 25 MG 24 hr tablet TAKE 1 TABLET BY MOUTH EVERY DAY 90 tablet 0    montelukast (SINGULAIR) 10 MG tablet TAKE 1 TABLET BY MOUTH EVERY DAY 90 tablet 0    MULTIPLE VITAMINS-MINERALS-FA PO Take  by mouth.      multivitamin (THERAGRAN) tablet tablet Take 1 tablet by mouth Daily.      pantoprazole (PROTONIX) 40 MG EC tablet TAKE 1 TABLET BY MOUTH EVERY DAY 90 tablet 3    Pyridoxine HCl (VITAMIN B-6 PO) Take by mouth.      tolnaftate (Tinactin) 1 % cream Apply 1 application topically to the appropriate area as directed 2 (Two) Times a Day. 113 g 6    traMADol (ULTRAM) 50 MG tablet Take 1 tablet by mouth Every 8 (Eight) Hours As Needed for Severe Pain. (Patient not taking: Reported on 2/10/2025)       No facility-administered medications prior to visit.     Opioid medication/s are on active medication list.  and I have evaluated her active treatment plan and pain score trends (see table).  There were no vitals filed for this visit.  I have reviewed the chart for potential of high risk medication and harmful drug interactions in the elderly.        Aspirin is on active medication list. Aspirin use is indicated based on review of current medical condition/s. Pros and cons of this therapy have been discussed today. Benefits of this medication outweigh potential harm.  Patient has been encouraged to continue taking this medication.  .      Patient Active Problem List   Diagnosis    Dilatation of aorta    Mild intermittent asthma without complication    Constipation    Depression    Gastroesophageal reflux disease without esophagitis    Dyslipidemia    Essential hypertension    Low back pain    Pericardial effusion    Morbid obesity    Thoracic back pain    Dysphagia    Fatigue    BALWINDER (obstructive sleep apnea)    Medicare annual wellness visit, subsequent    Arm pain, anterior, right     "Chronic pain of both knees    Cough present for greater than 3 weeks    Asthma    Arthritis    Allergies    Chronic migraine without aura without status migrainosus, not intractable    Current moderate episode of major depressive disorder without prior episode    Nail fungus    Postmenopausal    Shortness of breath    Vitamin D deficiency    Edema of both legs    Prediabetes    Acute exacerbation of CHF (congestive heart failure)    Dyspnea    Hospital discharge follow-up    Frequent falls    Imbalance    Bilateral hip pain    Memory deficit     Advance Care Planning Advance Directive is not on file.  ACP discussion was held with the patient during this visit. Patient has an advance directive (not in EMR), copy requested.            Objective   There were no vitals filed for this visit.    Estimated body mass index is 39.65 kg/m² as calculated from the following:    Height as of 2/10/25: 152.4 cm (60\").    Weight as of 2/10/25: 92.1 kg (203 lb).                Does the patient have evidence of cognitive impairment? No  Lab Results   Component Value Date    CHLPL 167 02/10/2025    TRIG 237 (H) 02/10/2025    HDL 47 02/10/2025    LDL 81 02/10/2025    VLDL 39 02/10/2025    HGBA1C 6.0 (H) 02/10/2025                                                                                                Health  Risk Assessment    Smoking Status:  Social History     Tobacco Use   Smoking Status Former    Passive exposure: Past   Smokeless Tobacco Never   Tobacco Comments    smoked less than 1 pack per day for 5 years     Alcohol Consumption:  Social History     Substance and Sexual Activity   Alcohol Use No       Fall Risk Screen  STEADI Fall Risk Assessment has not been completed.    Depression Screening   The PHQ has not been completed during this encounter.     Health Habits and Functional and Cognitive Screenin/10/2025    11:00 AM   Functional & Cognitive Status   Do you have difficulty preparing food and eating? No "   Do you have difficulty bathing yourself, getting dressed or grooming yourself? No   Do you have difficulty using the toilet? No   Do you have difficulty moving around from place to place? No   Do you have trouble with steps or getting out of a bed or a chair? No   Current Diet Well Balanced Diet   Dental Exam Up to date   Eye Exam Up to date   Exercise (times per week) 0 times per week   Do you need help using the phone?  No   Are you deaf or do you have serious difficulty hearing?  Yes   Do you need help to go to places out of walking distance? Yes   Do you need help shopping? No   Do you need help preparing meals?  Yes   Do you need help with housework?  Yes   Do you need help with laundry? No   Do you need help taking your medications? No   Do you need help managing money? No   Do you ever drive or ride in a car without wearing a seat belt? No   Have you felt unusual stress, anger or loneliness in the last month? No   Who do you live with? Spouse   If you need help, do you have trouble finding someone available to you? No   Have you been bothered in the last four weeks by sexual problems? No   Do you have difficulty concentrating, remembering or making decisions? No           Age-appropriate Screening Schedule:  Refer to the list below for future screening recommendations based on patient's age, sex and/or medical conditions. Orders for these recommended tests are listed in the plan section. The patient has been provided with a written plan.    Health Maintenance List  Health Maintenance   Topic Date Due    DXA SCAN  Never done    TDAP/TD VACCINES (1 - Tdap) Never done    ZOSTER VACCINE (2 of 2) 12/09/2020    RSV Vaccine - Adults (1 - 1-dose 75+ series) Never done    COVID-19 Vaccine (5 - 2024-25 season) 09/01/2024    INFLUENZA VACCINE  07/01/2025    LIPID PANEL  02/10/2026    ANNUAL WELLNESS VISIT  04/10/2026    COLORECTAL CANCER SCREENING  12/16/2032    Pneumococcal Vaccine 50+  Completed    HEPATITIS C  SCREENING  Discontinued                                                                                                                                                CMS Preventative Services Quick Reference  Risk Factors Identified During Encounter  None Identified    The above risks/problems have been discussed with the patient.  Pertinent information has been shared with the patient in the After Visit Summary.  An After Visit Summary and PPPS were made available to the patient.    Follow Up:   Next Medicare Wellness visit to be scheduled in 1 year.        Preventive Counseling for MWE:  Work on diet and exercise .    Recommend shingrix, rsv vaccines.  Mammo and bone density scan done by gyn.      Additional E&M Note during same encounter follows:  Patient has additional, significant, and separately identifiable condition(s)/problem(s) that require work above and beyond the Medicare Wellness Visit     Chief Complaint  Pt has been having left hip pain    Subjective   HPI  Bella is also being seen today for additional medical problem/s.  Pt presents today for follow up and and  Back and hip pain.  She has seen ortho regarding hip pain earlier this year.  Left hip pain- she had MRI done and showed arthritis.  He thinks her back has to do with her hip pain.  She has trouble sleeping due to the arthritis.  She has been having memory issues as well.    She has fungal infection abd bw folds.  Clobatesol not helping.                Objective   Vital Signs:  There were no vitals taken for this visit.  Physical Exam  NAD AAOx3               Assessment and Plan      Memory deficit         Bilateral hip pain    Orders:    Ibuprofen 3 %, Gabapentin 10 %, Baclofen 2 %, lidocaine 4 %; Apply 1-2 g topically to the appropriate area as directed 3 (Three) to 4 (Four) times daily.    Chronic midline low back pain with right-sided sciatica    Orders:    Ibuprofen 3 %, Gabapentin 10 %, Baclofen 2 %, lidocaine 4 %; Apply 1-2 g  topically to the appropriate area as directed 3 (Three) to 4 (Four) times daily.    Tinea corporis    Orders:    clotrimazole (LOTRIMIN) 1 % cream; Apply 1 Application topically to the appropriate area as directed 2 (Two) Times a Day.            Follow Up   No follow-ups on file.  Patient was given instructions and counseling regarding her condition or for health maintenance advice. Please see specific information pulled into the AVS if appropriate.  35 min    Follow up with neuro regarding memory deficit.    Start compound cream and start lotrimin.  SE discussed.

## 2025-04-11 DIAGNOSIS — B35.4 TINEA CORPORIS: ICD-10-CM

## 2025-04-11 RX ORDER — CLOTRIMAZOLE 1 %
1 CREAM (GRAM) TOPICAL 2 TIMES DAILY
Qty: 42 G | Refills: 2 | Status: SHIPPED | OUTPATIENT
Start: 2025-04-11

## 2025-05-02 ENCOUNTER — OFFICE VISIT (OUTPATIENT)
Dept: CARDIOLOGY | Facility: CLINIC | Age: 80
End: 2025-05-02
Payer: MEDICARE

## 2025-05-02 VITALS — HEART RATE: 73 BPM | BODY MASS INDEX: 40.64 KG/M2 | WEIGHT: 207 LBS | OXYGEN SATURATION: 92 % | HEIGHT: 60 IN

## 2025-05-02 DIAGNOSIS — I77.819 DILATATION OF AORTA: ICD-10-CM

## 2025-05-02 DIAGNOSIS — I10 ESSENTIAL HYPERTENSION: ICD-10-CM

## 2025-05-02 DIAGNOSIS — R55 SYNCOPE AND COLLAPSE: Primary | ICD-10-CM

## 2025-05-02 DIAGNOSIS — I50.32 CHRONIC DIASTOLIC HEART FAILURE: ICD-10-CM

## 2025-05-02 NOTE — PROGRESS NOTES
[facility]      Patient Name: Bella Dukes  :1945  Age: 80 y.o.  Encounter Provider:  Levi Up Jr, MD      Chief Complaint: Follow-up evaluation falls        HPI  Bella Dukes is a 80 y.o. female past medical history of generalized anxiety disorder, mixed hyperlipidemia, hypertension, chronic diastolic heart failure who presents for follow-up of syncope.      Last clinic visit note: She was admitted 2024 with progressive dyspnea.  She had complaints of chest pain and was seen by cardiology.  Responded well to diuretics and was sent home on losartan and hydrochlorothiazide.  Echocardiogram showed EF of greater than 70% with no significant valvular heart disease.  The stress study showed no evidence of ischemia.  She notes recent fall with questionable loss of consciousness that resulted in facial trauma.  She has had a few of these episodes.  No specific complaints prior to or after the episode other than facial pain.  She has a known history of sleep apnea and stopped using her CPAP.  She is scheduled for follow-up with pulmonary and sleep medicine.  No family history of premature coronary artery disease or sudden cardiac death.  Her brother did die early of suppose it complications of emphysema.  The patient was a short-term smoker in her early years but quit many years ago.  She denies alcohol or illicit drug use.    Patient has had several falls since last visit.  Holter monitor was worn with no sustained arrhythmias.  Echocardiogram done in September with no significant abnormalities other than a small amount of aortic regurgitation.  Patient states with this fall she gets a drifting sensation and before she knows that she is on the floor.  No loss of consciousness.  No palpitations.  No angina.  No orthopnea, PND or edema.    No falls since last visit.  She is using a walker now and feels more comfortable when having to travel longer distance.  Blood pressure and  "heart rate are well-controlled today in clinic.  No angina.  No orthopnea, PND or edema.  No palpitations or syncope.    The following portions of the patient's history were reviewed and updated as appropriate: allergies, current medications, past family history, past medical history, past social history, past surgical history and problem list.      Review of Systems   Constitutional: Negative for chills and fever.   HENT:  Negative for hoarse voice and sore throat.    Eyes:  Negative for double vision and photophobia.   Cardiovascular:  Positive for syncope. Negative for chest pain, leg swelling, near-syncope, orthopnea, palpitations and paroxysmal nocturnal dyspnea.   Respiratory:  Negative for cough and wheezing.    Skin:  Negative for poor wound healing and rash.   Musculoskeletal:  Negative for arthritis and joint swelling.   Gastrointestinal:  Negative for bloating, abdominal pain, hematemesis and hematochezia.   Neurological:  Negative for dizziness and focal weakness.   Psychiatric/Behavioral:  Negative for depression and suicidal ideas.        OBJECTIVE:   Vital Signs  Vitals:    05/02/25 1314   Pulse: 73   SpO2: 92%     Estimated body mass index is 40.43 kg/m² as calculated from the following:    Height as of this encounter: 152.4 cm (60\").    Weight as of this encounter: 93.9 kg (207 lb).    Vitals reviewed.   Constitutional:       Appearance: Not in distress. Chronically ill-appearing.   Neck:      Vascular: No JVR. JVD normal.   Pulmonary:      Effort: Pulmonary effort is normal.      Breath sounds: Normal breath sounds. No wheezing. No rhonchi. No rales.   Chest:      Chest wall: Not tender to palpatation.   Cardiovascular:      PMI at left midclavicular line. Normal rate. Regular rhythm. Normal S1. Normal S2.       Murmurs: There is no murmur.      No gallop.  No click. No rub.   Pulses:     Intact distal pulses.   Edema:     Thigh: bilateral trace edema of the thigh.     Pretibial: bilateral trace " edema of the pretibial area.     Ankle: bilateral trace edema of the ankle.     Feet: bilateral trace edema of the feet.  Abdominal:      General: Bowel sounds are normal.      Palpations: Abdomen is soft.      Tenderness: There is no abdominal tenderness.   Musculoskeletal: Normal range of motion.         General: No tenderness. Skin:     General: Skin is warm and dry.   Neurological:      General: No focal deficit present.      Mental Status: Alert and oriented to person, place and time.       Procedures  Lipid Panel          6/21/2024    14:38 2/10/2025    16:10   Lipid Panel   Total Cholesterol 198  167    Triglycerides 157  237    HDL Cholesterol 51  47    VLDL Cholesterol 28  39    LDL Cholesterol  119  81         BUN   Date Value Ref Range Status   02/10/2025 16 8 - 27 mg/dL Final   09/26/2024 14 8 - 23 mg/dL Final     Creatinine   Date Value Ref Range Status   02/10/2025 0.95 0.57 - 1.00 mg/dL Final   09/26/2024 0.95 0.57 - 1.00 mg/dL Final   01/13/2023 1.00 0.60 - 1.30 mg/dL Final     Comment:     Serial Number: [id number]:  [id number]     Potassium   Date Value Ref Range Status   02/10/2025 4.7 3.5 - 5.2 mmol/L Final   09/26/2024 3.3 (L) 3.5 - 5.2 mmol/L Final     Comment:     Slight hemolysis detected by analyzer. Result may be falsely elevated.     ALT (SGPT)   Date Value Ref Range Status   02/10/2025 16 0 - 32 IU/L Final   09/24/2024 11 1 - 33 U/L Final     AST (SGOT)   Date Value Ref Range Status   02/10/2025 22 0 - 40 IU/L Final   09/24/2024 14 1 - 32 U/L Final           ASSESSMENT:     80-year-old female presents for follow-up of chronic diastolic heart failure, TAA, mixed hyperlipidemia, frequent falls      PLAN OF CARE:     Falls - etiology uncertain.  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  Not sure the symptoms are classic for paroxysmal or positional vertigo but we have undergone a comprehensive cardiac workup with no etiology and I think it would be beneficial for her to  be seen by the neurology service.  She has an initial evaluation with Dr. Benavidez in July 2025.  Chronic diastolic heart failure -blood pressure is well-controlled today in clinic.  She is euvolemic in clinic on hydrochlorothiazide.  Monitor closely.  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  Repeat CT chest next year.  Mixed hyperlipidemia    Return to clinic 6 months             Discharge Medications            Accurate as of May 2, 2025  1:30 PM. If you have any questions, ask your nurse or doctor.                Continue These Medications        Instructions Start Date   amitriptyline 10 MG tablet  Commonly known as: ELAVIL   10 mg, Oral, Every Night at Bedtime      aspirin 81 MG tablet   Daily      atorvastatin 40 MG tablet  Commonly known as: LIPITOR   40 mg, Oral, Daily      buPROPion  MG 24 hr tablet  Commonly known as: WELLBUTRIN XL   300 mg, Oral, Daily      buPROPion  MG 24 hr tablet  Commonly known as: WELLBUTRIN XL   150 mg, Oral, Daily      cetirizine 10 MG tablet  Commonly known as: zyrTEC   1 tablet, Daily      citalopram 40 MG tablet  Commonly known as: CeleXA   TAKE 1 TABLET BY MOUTH EVERY DAY      clotrimazole 1 % cream  Commonly known as: LOTRIMIN   1 Application, Topical, 2 Times Daily      diazePAM 5 MG tablet  Commonly known as: VALIUM   5 mg, Every 6 Hours PRN      diphenhydrAMINE-acetaminophen  MG tablet per tablet  Commonly known as: TYLENOL PM   1 tablet, Nightly PRN      FA-Vitamin B-6-Vitamin B-12 2.2-25-0.5 MG tablet   Take  by mouth.      furosemide 20 MG tablet  Commonly known as: Lasix   One a day prn swelling.      Ibuprofen 3 %, Gabapentin 10 %, Baclofen 2 %, lidocaine 4 %   1-2 g, Topical, 3 to 4 Times Daily      losartan 100 MG tablet  Commonly known as: COZAAR   100 mg, Oral, Daily      metoprolol succinate XL 25 MG 24 hr tablet  Commonly known as: TOPROL-XL   25 mg, Oral, Daily      montelukast 10 MG tablet  Commonly known as: SINGULAIR   10 mg, Oral,  Daily      MULTIPLE VITAMINS-MINERALS-FA PO   Take  by mouth.      multivitamin tablet tablet   1 tablet, Daily      pantoprazole 40 MG EC tablet  Commonly known as: PROTONIX   40 mg, Oral, Daily      tolnaftate 1 % cream  Commonly known as: Tinactin   1 application , Topical, 2 Times Daily      traMADol 50 MG tablet  Commonly known as: ULTRAM   50 mg, Every 8 Hours PRN      VITAMIN B-6 PO   Take by mouth.      VITAMIN B12 PO   1 tablet, Daily               Thank you for allowing me to participate in the care of your patient,      Sincerely,   Levi Up Jr, MD  [facility]  05/02/25  13:30 EDT

## 2025-05-14 DIAGNOSIS — T78.40XD ALLERGY, SUBSEQUENT ENCOUNTER: ICD-10-CM

## 2025-05-14 RX ORDER — MONTELUKAST SODIUM 10 MG/1
10 TABLET ORAL DAILY
Qty: 90 TABLET | Refills: 1 | Status: SHIPPED | OUTPATIENT
Start: 2025-05-14

## 2025-05-14 NOTE — TELEPHONE ENCOUNTER
LOV                  2/10/2025                    NOV                  due 8/10/2025  LAST REFILL     1/3/2025  PROTOCOL       Met

## 2025-05-28 ENCOUNTER — TELEPHONE (OUTPATIENT)
Dept: FAMILY MEDICINE CLINIC | Facility: CLINIC | Age: 80
End: 2025-05-28

## 2025-05-28 NOTE — TELEPHONE ENCOUNTER
Caller: Bella Dukes TRESSA    Relationship: Self    Best call back number: [phone]     Requested Prescriptions: YEAST INFECTION   Requested Prescriptions      No prescriptions requested or ordered in this encounter        Pharmacy where request should be sent:[id number] - [phone]  - [phone]       Last office visit with prescribing clinician: 2/10/2025   Last telemedicine visit with prescribing clinician: 4/10/2025   Next office visit with prescribing clinician: Visit date not found     Additional details provided by patient: PATIENT IS CALLING TO STATE SHE THINKS SHE HAS A YEAST INFECTION UNDERNEATH BOTH BREASTS AND IN THE GROIN AREA.  SHE STATES SHE THINKS SHE NEEDS TO SOMETHING IN PILL FORM, OR WHATEVER DR HECTOR RECOMMENDS.    Does the patient have less than a 3 day supply:  [x] Yes  [] No    Would you like a call back once the refill request has been completed: [] Yes [] No    If the office needs to give you a call back, can they leave a voicemail: [] Yes [] No    Renato Moreland Rep   05/28/25 16:11 EDT     PLEASE ADVISE.

## 2025-05-29 RX ORDER — FLUCONAZOLE 100 MG/1
100 TABLET ORAL DAILY
Qty: 10 TABLET | Refills: 0 | Status: SHIPPED | OUTPATIENT
Start: 2025-05-29 | End: 2025-05-31

## 2025-05-29 NOTE — TELEPHONE ENCOUNTER
PATIENT IS CALLING BACK IN REGARDS TO YEAST INFECTION. SHE STATES THE OINTMENTS DON'T WORK FOR  HER. SHE STATES SHE IS REQUESTING A PILL TO HELP WITH THE YEAST UNDERNEATH BREAST AND IN FOLDS OF SKIN AND GROIN AREA OF LEGS    [id number] - [place], [place] - [address] - [phone]  - [phone]  [phone]     CALL BACK NUMBER [phone]

## 2025-05-30 ENCOUNTER — TELEPHONE (OUTPATIENT)
Dept: FAMILY MEDICINE CLINIC | Facility: CLINIC | Age: 80
End: 2025-05-30

## 2025-05-30 DIAGNOSIS — F32.A DEPRESSION, UNSPECIFIED DEPRESSION TYPE: ICD-10-CM

## 2025-05-30 RX ORDER — CITALOPRAM HYDROBROMIDE 40 MG/1
40 TABLET ORAL DAILY
Qty: 90 TABLET | Refills: 1 | Status: SHIPPED | OUTPATIENT
Start: 2025-05-30

## 2025-05-30 NOTE — TELEPHONE ENCOUNTER
Caller: Bella Dukes    Relationship: Self    Best call back number: [phone]    What medication are you requesting: ALTERNATIVE TO fluconazole (Diflucan) 100 MG tablet     What are your current symptoms: ITCHING     How long have you been experiencing symptoms: YESTERDAY    Have you had these symptoms before:    [x] Yes  [] No    Have you been treated for these symptoms before:   [x] Yes  [] No    If a prescription is needed, what is your preferred pharmacy and phone number: [id number] - [place] [address] [address] - [phone]  - [phone]     Additional notes:  PATIENT STATES SHE WAS PRESCRIBED THIS MEDICATION BECAUSE OF A BAD RASH SHE HAS ONGOING.     STATES SHE TOOK ONE PILL IN THE AFTERNOON YESTERDAY AND WOKE UP SUPER ITCHY ALL OVER.     AS IF SHE WOULD ITCH HER SKIN OFF.     PLEASE CALL PATIENT TO CONFIRM OR DENY.

## 2025-05-31 DIAGNOSIS — B35.4 TINEA CORPORIS: Primary | ICD-10-CM

## 2025-05-31 RX ORDER — TERBINAFINE HYDROCHLORIDE 250 MG/1
250 TABLET ORAL DAILY
Qty: 14 TABLET | Refills: 0 | Status: SHIPPED | OUTPATIENT
Start: 2025-05-31

## 2025-06-27 RX ORDER — METOPROLOL SUCCINATE 25 MG/1
25 TABLET, EXTENDED RELEASE ORAL DAILY
Qty: 90 TABLET | Refills: 1 | Status: SHIPPED | OUTPATIENT
Start: 2025-06-27

## 2025-06-27 NOTE — TELEPHONE ENCOUNTER
LOV                  4/10/2025                    NOV                  due 8/10/2025  LAST REFILL    1/22/2025   PROTOCOL      Met

## 2025-07-08 ENCOUNTER — OFFICE VISIT (OUTPATIENT)
Dept: NEUROLOGY | Facility: CLINIC | Age: 80
End: 2025-07-08
Payer: MEDICARE

## 2025-07-08 VITALS
HEART RATE: 66 BPM | HEIGHT: 60 IN | WEIGHT: 208.8 LBS | DIASTOLIC BLOOD PRESSURE: 70 MMHG | SYSTOLIC BLOOD PRESSURE: 116 MMHG | OXYGEN SATURATION: 94 % | BODY MASS INDEX: 40.99 KG/M2

## 2025-07-08 DIAGNOSIS — Q67.0 FACIAL ASYMMETRY: ICD-10-CM

## 2025-07-08 DIAGNOSIS — G57.93 NEUROPATHY OF BOTH FEET: ICD-10-CM

## 2025-07-08 DIAGNOSIS — G62.9 NEUROPATHY: ICD-10-CM

## 2025-07-08 DIAGNOSIS — R73.09 OTHER ABNORMAL GLUCOSE: ICD-10-CM

## 2025-07-08 DIAGNOSIS — R26.89 BALANCE DISORDER: Primary | ICD-10-CM

## 2025-07-08 PROCEDURE — 1159F MED LIST DOCD IN RCRD: CPT | Performed by: STUDENT IN AN ORGANIZED HEALTH CARE EDUCATION/TRAINING PROGRAM

## 2025-07-08 PROCEDURE — 1160F RVW MEDS BY RX/DR IN RCRD: CPT | Performed by: STUDENT IN AN ORGANIZED HEALTH CARE EDUCATION/TRAINING PROGRAM

## 2025-07-08 PROCEDURE — 99204 OFFICE O/P NEW MOD 45 MIN: CPT | Performed by: STUDENT IN AN ORGANIZED HEALTH CARE EDUCATION/TRAINING PROGRAM

## 2025-07-08 PROCEDURE — 3074F SYST BP LT 130 MM HG: CPT | Performed by: STUDENT IN AN ORGANIZED HEALTH CARE EDUCATION/TRAINING PROGRAM

## 2025-07-08 PROCEDURE — 3078F DIAST BP <80 MM HG: CPT | Performed by: STUDENT IN AN ORGANIZED HEALTH CARE EDUCATION/TRAINING PROGRAM

## 2025-07-08 RX ORDER — MELOXICAM 15 MG/1
1 TABLET ORAL DAILY
COMMUNITY
Start: 2025-05-14

## 2025-07-08 NOTE — PROGRESS NOTES
Chief Complaint   Patient presents with    Gait Problem       Patient ID: Bella Dukes is a 80 y.o. female.    HPI:    The following portions of the patient's history were reviewed and updated as appropriate: allergies, current medications, past family history, past medical history, past social history, past surgical history and problem list.    Interval history:    Review of Systems   Musculoskeletal:  Positive for gait problem (few falls in the last year).   Neurological:  Negative for dizziness, tremors, seizures, syncope, facial asymmetry, speech difficulty, weakness, light-headedness, numbness and headaches.      History of Present Illness  The patient is a 80-year-old female who presents to the neurology clinic. The primary reason for this visit is a history of syncope and collapse, although the syncope is questionable. She was referred by cardiology. She has also seen neurosurgery at [place] and has undergone cardiac monitoring for her falls, which showed no arrhythmias, indicating no cardiac cause for her falls or syncope. In October 2024, her review of systems was positive for syncope. She reported to her cardiologist that she experienced a drifting sensation before she found herself on the floor. She believes her falls are due to gait problems rather than loss of consciousness. She recalls one incident where she does not remember falling, which may reflect the event of questionable loss of consciousness.    She has a history of multiple falls. October 2024, she fell and hit her face, resulting in bruising, but did not seek medical attention. She does not remember tripping over anything during this fall. She has had two falls this year, one in January 2025 and another three days later in the bathroom. She reports no recent falls since January 2025. She has been using a walker since September 2024 and has not had any falls since then. She has not undergone physical therapy in the past year.    She has  noticed a crooked smile for about 10 years, which her dentist suggested might be due to a stroke. She has lost 4 inches in height and now measures 5 feet tall. She has a history of right knee replacement surgery 17 years ago and experiences occasional pain in her right hip. She also reports a history of carpal tunnel syndrome, which was self-diagnosed and resolved with vitamin B6 supplementation for many years. She takes amitriptyline, Wellbutrin, Celexa, and a combination of pyridoxine, folate, and B12.       Her A1c is in the prediabetic range and has been for at least 9 years. B12 and folate levels were previously checked and found to be high. TSH was checked and was normal.     Vitamin B6 300 mg Oral Twice daily  Start Date: 2006      Vitals:    07/08/25 1527   BP: 116/70   Pulse: 66   SpO2: 94%       Neurological Exam  Mental Status  Alert.    Cranial Nerves  CN II: Right normal visual field. Left normal visual field.  CN III, IV, VI: Extraocular movements intact bilaterally. Pupils equal round and reactive to light bilaterally.  CN V:  Right: Facial sensation is normal.  Left: Facial sensation is normal on the left.  CN IX, X:  Right: Palate is normal.  Left: Palate is normal.  CN XI:  Right: Trapezius strength is normal.  Left: Trapezius strength is normal.  CN XII: Tongue midline without atrophy or fasciculations.  Normal hearing to finger rub bilaterally. Left smile does not open as much as right side - chronic for at least 10 years. .    Motor                                               Right                     Left  Deltoid                                   5                          5   Biceps                                   5                          5   Triceps                                  5                          5   Iliopsoas                               4                          4   Quadriceps                           5                          5   Hamstring                              5                          5   Gastrocnemius                     5                           5   Anterior tibialis                      5                          5    Sensory  Light touch is normal in upper and lower extremities.     Reflexes                                            Right                      Left  Brachioradialis                    2+                         2+  Biceps                                 2+                         2+  Patellar                                0                         0  Achilles                                0                         0    Coordination  Right: Finger-to-nose normal. Heel-to-shin normal.Left: Finger-to-nose normal. Heel-to-shin normal.    Gait    Uses rolling walker. Unable to do tandem..      Physical Exam  Eyes:      Extraocular Movements: Extraocular movements intact.      Pupils: Pupils are equal, round, and reactive to light.   Neurological:      Mental Status: She is alert.      Deep Tendon Reflexes:      Reflex Scores:       Bicep reflexes are 2+ on the right side and 2+ on the left side.       Brachioradialis reflexes are 2+ on the right side and 2+ on the left side.       Patellar reflexes are 0 on the right side and 0 on the left side.       Achilles reflexes are 0 on the right side and 0 on the left side.      Procedures    Assessment/Plan:    Assessment & Plan  1. Falls.  She reports a history of falls, including a significant fall in October 2024 where she does not remember the event, raising concerns about possible loss of consciousness. She has had two falls in January 2025 but none since using a walker regularly. Physical therapy is recommended to evaluate and improve her balance. An MRI of the brain will be ordered to investigate the cause of her facial asymmetry, which could potentially be due to a stroke that may also affect balance and walking. A standard neuropathy workup will be conducted to identify treatable causes of nerve  damage.  Your balance troubles are multifactorial due to back and knee issues and neuropathy.       Her A1c levels have been in the prediabetic range for at least 9 years, which can potentially lead to nerve damage over time. She is advised to discuss this with her primary care physician for further management.       She has been taking vitamin B6 for many years to manage carpal tunnel symptoms. Excessive intake of B6 can cause nerve damage. A B6 level check will be conducted, and if the levels are high, she should discontinue its use.     She has arthritis in her lower back, specifically at L4 and L5, as confirmed by an MRI. She is encouraged to follow up with her orthopedist, to discuss the MRI results and potential treatment options.   I spent 48 minutes caring for this patient on this date of service. This time includes time spent by me in the following activities as necessary: preparing for the visit, reviewing tests, medical records and previous visits, obtaining and/or reviewing a separately obtained history, performing a medically appropriate exam and/or evaluation, counseling and educating the patient, and/or communicating with other healthcare professionals, documenting information in the medical record, independently interpreting results and communicating that information with the patient, and developing a medically appropriate treatment plan with consideration of other conditions, medications, and treatments.      Follow-up  A follow-up visit is scheduled in 4 months to discuss the results of her MRI, physical therapy progress, and lab work.     Patient or patient representative verbalized consent for the use of Ambient Listening during the visit with  Antonio Benavidez MD for chart documentation. 7/8/2025  18:46 EDT     Diagnoses and all orders for this visit:    1. Balance disorder (Primary)  -     Cancel: Vitamin B6  -     MRI Brain With & Without Contrast; Future  -     Vitamin B6  -     Ambulatory  Referral to Physical Therapy for Evaluation & Treatment    2. Neuropathy    3. Facial asymmetry  -     MRI Brain With & Without Contrast; Future    4. Other abnormal glucose  -     Hemoglobin A1c    5. Neuropathy of both feet  -     Ambulatory Referral to Physical Therapy for Evaluation & Treatment  -     Cryoglobulin  -     Hemoglobin A1c  -     TSH Rfx On Abnormal To Free T4  -     Vitamin B1, Whole Blood  -     Vitamin E  -     Folate  -     Copper, Serum  -     Zinc  -     ETAGAN  -     ANCA Panel  -     Sjogren's Antibody, Anti-SS-A / -SS-B  -     Vitamin B12  -     AURELIA + PE  -     Rheumatoid Arthritis (RA) Profile           Antonio Benavidez MD

## 2025-07-08 NOTE — LETTER
July 8, 2025     Levi Up Jr., MD  [address]  [address]  [place] [address]    Patient: Bella Dukes   YOB: 1945   Date of Visit: 7/8/2025     Dear Levi Up Jr., MD:       Thank you for referring Bella Dukes to me for evaluation. Below are the relevant portions of my assessment and plan of care.    If you have questions, please do not hesitate to call me. I look forward to following Bella along with you.         Sincerely,        Antonio Benavidez MD        CC: Antonio Shook MD  07/08/25 4625  Sign when Signing Visit  Chief Complaint   Patient presents with   • Gait Problem       Patient ID: Bella Dukes is a 80 y.o. female.    HPI:    The following portions of the patient's history were reviewed and updated as appropriate: allergies, current medications, past family history, past medical history, past social history, past surgical history and problem list.    Interval history:    Review of Systems   Musculoskeletal:  Positive for gait problem (few falls in the last year).   Neurological:  Negative for dizziness, tremors, seizures, syncope, facial asymmetry, speech difficulty, weakness, light-headedness, numbness and headaches.      History of Present Illness  The patient is a 80-year-old female who presents to the neurology clinic. The primary reason for this visit is a history of syncope and collapse, although the syncope is questionable. She was referred by cardiology. She has also seen neurosurgery at [place] and has undergone cardiac monitoring for her falls, which showed no arrhythmias, indicating no cardiac cause for her falls or syncope. In October 2024, her review of systems was positive for syncope. She reported to her cardiologist that she experienced a drifting sensation before she found herself on the floor. She believes her falls are due to gait problems rather than loss of consciousness. She recalls one incident where she does not remember falling, which  may reflect the event of questionable loss of consciousness.    She has a history of multiple falls. October 2024, she fell and hit her face, resulting in bruising, but did not seek medical attention. She does not remember tripping over anything during this fall. She has had two falls this year, one in January 2025 and another three days later in the bathroom. She reports no recent falls since January 2025. She has been using a walker since September 2024 and has not had any falls since then. She has not undergone physical therapy in the past year.    She has noticed a crooked smile for about 10 years, which her dentist suggested might be due to a stroke. She has lost 4 inches in height and now measures 5 feet tall. She has a history of right knee replacement surgery 17 years ago and experiences occasional pain in her right hip. She also reports a history of carpal tunnel syndrome, which was self-diagnosed and resolved with vitamin B6 supplementation for many years. She takes amitriptyline, Wellbutrin, Celexa, and a combination of pyridoxine, folate, and B12.       Her A1c is in the prediabetic range and has been for at least 9 years. B12 and folate levels were previously checked and found to be high. TSH was checked and was normal.     Vitamin B6 300 mg Oral Twice daily  Start Date: 2006      Vitals:    07/08/25 1527   BP: 116/70   Pulse: 66   SpO2: 94%       Neurological Exam  Mental Status  Alert.    Cranial Nerves  CN II: Right normal visual field. Left normal visual field.  CN III, IV, VI: Extraocular movements intact bilaterally. Pupils equal round and reactive to light bilaterally.  CN V:  Right: Facial sensation is normal.  Left: Facial sensation is normal on the left.  CN IX, X:  Right: Palate is normal.  Left: Palate is normal.  CN XI:  Right: Trapezius strength is normal.  Left: Trapezius strength is normal.  CN XII: Tongue midline without atrophy or fasciculations.  Normal hearing to finger rub  bilaterally. Left smile does not open as much as right side - chronic for at least 10 years. .    Motor                                               Right                     Left  Deltoid                                   5                          5   Biceps                                   5                          5   Triceps                                  5                          5   Iliopsoas                               4                          4   Quadriceps                           5                          5   Hamstring                             5                          5   Gastrocnemius                     5                           5   Anterior tibialis                      5                          5    Sensory  Light touch is normal in upper and lower extremities.     Reflexes                                            Right                      Left  Brachioradialis                    2+                         2+  Biceps                                 2+                         2+  Patellar                                0                         0  Achilles                                0                         0    Coordination  Right: Finger-to-nose normal. Heel-to-shin normal.Left: Finger-to-nose normal. Heel-to-shin normal.    Gait    Uses rolling walker. Unable to do tandem..      Physical Exam  Eyes:      Extraocular Movements: Extraocular movements intact.      Pupils: Pupils are equal, round, and reactive to light.   Neurological:      Mental Status: She is alert.      Deep Tendon Reflexes:      Reflex Scores:       Bicep reflexes are 2+ on the right side and 2+ on the left side.       Brachioradialis reflexes are 2+ on the right side and 2+ on the left side.       Patellar reflexes are 0 on the right side and 0 on the left side.       Achilles reflexes are 0 on the right side and 0 on the left side.      Procedures    Assessment/Plan:    Assessment & Plan  1.  Falls.  She reports a history of falls, including a significant fall in October 2024 where she does not remember the event, raising concerns about possible loss of consciousness. She has had two falls in January 2025 but none since using a walker regularly. Physical therapy is recommended to evaluate and improve her balance. An MRI of the brain will be ordered to investigate the cause of her facial asymmetry, which could potentially be due to a stroke that may also affect balance and walking. A standard neuropathy workup will be conducted to identify treatable causes of nerve damage.  Your balance troubles are multifactorial due to back and knee issues and neuropathy.       Her A1c levels have been in the prediabetic range for at least 9 years, which can potentially lead to nerve damage over time. She is advised to discuss this with her primary care physician for further management.       She has been taking vitamin B6 for many years to manage carpal tunnel symptoms. Excessive intake of B6 can cause nerve damage. A B6 level check will be conducted, and if the levels are high, she should discontinue its use.     She has arthritis in her lower back, specifically at L4 and L5, as confirmed by an MRI. She is encouraged to follow up with her orthopedist, to discuss the MRI results and potential treatment options.   I spent 48 minutes caring for this patient on this date of service. This time includes time spent by me in the following activities as necessary: preparing for the visit, reviewing tests, medical records and previous visits, obtaining and/or reviewing a separately obtained history, performing a medically appropriate exam and/or evaluation, counseling and educating the patient, and/or communicating with other healthcare professionals, documenting information in the medical record, independently interpreting results and communicating that information with the patient, and developing a medically appropriate  treatment plan with consideration of other conditions, medications, and treatments.      Follow-up  A follow-up visit is scheduled in 4 months to discuss the results of her MRI, physical therapy progress, and lab work.     Patient or patient representative verbalized consent for the use of Ambient Listening during the visit with  Antonio Benavidez MD for chart documentation. 7/8/2025  18:46 EDT     Diagnoses and all orders for this visit:    1. Balance disorder (Primary)  -     Cancel: Vitamin B6  -     MRI Brain With & Without Contrast; Future  -     Vitamin B6  -     Ambulatory Referral to Physical Therapy for Evaluation & Treatment    2. Neuropathy    3. Facial asymmetry  -     MRI Brain With & Without Contrast; Future    4. Other abnormal glucose  -     Hemoglobin A1c    5. Neuropathy of both feet  -     Ambulatory Referral to Physical Therapy for Evaluation & Treatment  -     Cryoglobulin  -     Hemoglobin A1c  -     TSH Rfx On Abnormal To Free T4  -     Vitamin B1, Whole Blood  -     Vitamin E  -     Folate  -     Copper, Serum  -     Zinc  -     TEAGAN  -     ANCA Panel  -     Sjogren's Antibody, Anti-SS-A / -SS-B  -     Vitamin B12  -     AURELIA + PE  -     Rheumatoid Arthritis (RA) Profile           Antonio Benavidez MD

## 2025-07-14 RX ORDER — ATORVASTATIN CALCIUM 40 MG/1
40 TABLET, FILM COATED ORAL DAILY
Qty: 90 TABLET | Refills: 1 | Status: SHIPPED | OUTPATIENT
Start: 2025-07-14

## 2025-08-04 ENCOUNTER — TELEPHONE (OUTPATIENT)
Dept: NEUROLOGY | Facility: CLINIC | Age: 80
End: 2025-08-04
Payer: MEDICARE

## 2025-08-05 ENCOUNTER — LAB (OUTPATIENT)
Facility: HOSPITAL | Age: 80
End: 2025-08-05
Payer: MEDICARE

## 2025-08-05 LAB
FOLATE SERPL-MCNC: >20 NG/ML (ref 4.78–24.2)
HBA1C MFR BLD: 5.9 % (ref 4.8–5.6)
TSH SERPL DL<=0.05 MIU/L-ACNC: 0.36 UIU/ML (ref 0.27–4.2)
VIT B12 BLD-MCNC: >2000 PG/ML (ref 211–946)

## 2025-08-05 PROCEDURE — 82746 ASSAY OF FOLIC ACID SERUM: CPT | Performed by: STUDENT IN AN ORGANIZED HEALTH CARE EDUCATION/TRAINING PROGRAM

## 2025-08-05 PROCEDURE — 83036 HEMOGLOBIN GLYCOSYLATED A1C: CPT | Performed by: STUDENT IN AN ORGANIZED HEALTH CARE EDUCATION/TRAINING PROGRAM

## 2025-08-05 PROCEDURE — 84443 ASSAY THYROID STIM HORMONE: CPT | Performed by: STUDENT IN AN ORGANIZED HEALTH CARE EDUCATION/TRAINING PROGRAM

## 2025-08-05 PROCEDURE — 82607 VITAMIN B-12: CPT | Performed by: STUDENT IN AN ORGANIZED HEALTH CARE EDUCATION/TRAINING PROGRAM

## 2025-08-06 LAB
ANA SER QL: NEGATIVE
CCP IGA+IGG SERPL IA-ACNC: 4 UNITS (ref 0–19)
ENA SS-A AB SER-ACNC: <0.2 AI (ref 0–0.9)
ENA SS-B AB SER-ACNC: <0.2 AI (ref 0–0.9)
RHEUMATOID FACT SERPL-ACNC: <10 IU/ML

## 2025-08-07 LAB
ALBUMIN SERPL ELPH-MCNC: 3.8 G/DL (ref 2.9–4.4)
ALBUMIN/GLOB SERPL: 1 {RATIO} (ref 0.7–1.7)
ALPHA1 GLOB SERPL ELPH-MCNC: 0.2 G/DL (ref 0–0.4)
ALPHA2 GLOB SERPL ELPH-MCNC: 0.7 G/DL (ref 0.4–1)
B-GLOBULIN SERPL ELPH-MCNC: 2.3 G/DL (ref 0.7–1.3)
C-ANCA TITR SER IF: NORMAL TITER
COPPER SERPL-MCNC: 74 UG/DL (ref 80–158)
GAMMA GLOB SERPL ELPH-MCNC: 0.7 G/DL (ref 0.4–1.8)
GLOBULIN SER-MCNC: 3.9 G/DL (ref 2.2–3.9)
IGA SERPL-MCNC: 1586 MG/DL (ref 64–422)
IGG SERPL-MCNC: 877 MG/DL (ref 586–1602)
IGM SERPL-MCNC: 52 MG/DL (ref 26–217)
INTERPRETATION SERPL IEP-IMP: ABNORMAL
LABORATORY COMMENT REPORT: ABNORMAL
M PROTEIN SERPL ELPH-MCNC: ABNORMAL G/DL
MYELOPEROXIDASE AB SER IA-ACNC: <0.2 UNITS (ref 0–0.9)
P-ANCA ATYPICAL TITR SER IF: NORMAL TITER
P-ANCA TITR SER IF: NORMAL TITER
PROT SERPL-MCNC: 7.7 G/DL (ref 6–8.5)
PROTEINASE3 AB SER IA-ACNC: <0.2 UNITS (ref 0–0.9)

## 2025-08-08 LAB
PYRIDOXAL PHOS SERPL-MCNC: 12.5 UG/L (ref 3.4–65.2)
VIT B1 BLD-SCNC: 100.8 NMOL/L (ref 66.5–200)
ZINC SERPL-MCNC: 72 UG/DL (ref 44–115)

## 2025-08-09 LAB
A-TOCOPHEROL VIT E SERPL-MCNC: 22.2 MG/L (ref 9–29)
GAMMA TOCOPHEROL SERPL-MCNC: 0.9 MG/L (ref 0.5–4.9)

## 2025-08-11 LAB — CRYOGLOB SER QL 1D COLD INC: NORMAL

## 2025-08-19 ENCOUNTER — TELEPHONE (OUTPATIENT)
Dept: NEUROLOGY | Facility: CLINIC | Age: 80
End: 2025-08-19
Payer: MEDICARE